# Patient Record
Sex: FEMALE | Race: WHITE | NOT HISPANIC OR LATINO | Employment: FULL TIME | URBAN - METROPOLITAN AREA
[De-identification: names, ages, dates, MRNs, and addresses within clinical notes are randomized per-mention and may not be internally consistent; named-entity substitution may affect disease eponyms.]

---

## 2018-05-18 ENCOUNTER — HISTORICAL (OUTPATIENT)
Dept: ADMINISTRATIVE | Facility: HOSPITAL | Age: 38
End: 2018-05-18

## 2018-05-21 ENCOUNTER — HISTORICAL (OUTPATIENT)
Dept: ADMINISTRATIVE | Facility: HOSPITAL | Age: 38
End: 2018-05-21

## 2018-08-14 ENCOUNTER — HISTORICAL (OUTPATIENT)
Dept: LAB | Facility: HOSPITAL | Age: 38
End: 2018-08-14

## 2018-08-14 ENCOUNTER — HISTORICAL (OUTPATIENT)
Dept: ADMINISTRATIVE | Facility: HOSPITAL | Age: 38
End: 2018-08-14

## 2019-10-04 LAB — PAP RECOMMENDATION EXT: NORMAL

## 2021-04-17 ENCOUNTER — HISTORICAL (OUTPATIENT)
Dept: ADMINISTRATIVE | Facility: HOSPITAL | Age: 41
End: 2021-04-17

## 2021-05-05 ENCOUNTER — HISTORICAL (OUTPATIENT)
Dept: ADMINISTRATIVE | Facility: HOSPITAL | Age: 41
End: 2021-05-05

## 2021-06-01 ENCOUNTER — HISTORICAL (OUTPATIENT)
Dept: ADMINISTRATIVE | Facility: HOSPITAL | Age: 41
End: 2021-06-01

## 2021-06-22 ENCOUNTER — HISTORICAL (OUTPATIENT)
Dept: LAB | Facility: HOSPITAL | Age: 41
End: 2021-06-22

## 2021-06-22 ENCOUNTER — HISTORICAL (OUTPATIENT)
Dept: ADMINISTRATIVE | Facility: HOSPITAL | Age: 41
End: 2021-06-22

## 2021-07-14 ENCOUNTER — HISTORICAL (OUTPATIENT)
Dept: RADIOLOGY | Facility: HOSPITAL | Age: 41
End: 2021-07-14

## 2021-07-20 ENCOUNTER — HISTORICAL (OUTPATIENT)
Dept: ADMINISTRATIVE | Facility: HOSPITAL | Age: 41
End: 2021-07-20

## 2021-07-28 ENCOUNTER — HISTORICAL (OUTPATIENT)
Dept: SURGERY | Facility: HOSPITAL | Age: 41
End: 2021-07-28

## 2021-08-31 ENCOUNTER — HISTORICAL (OUTPATIENT)
Dept: RADIOLOGY | Facility: HOSPITAL | Age: 41
End: 2021-08-31

## 2021-09-23 ENCOUNTER — HISTORICAL (OUTPATIENT)
Dept: ADMINISTRATIVE | Facility: HOSPITAL | Age: 41
End: 2021-09-23

## 2021-10-11 ENCOUNTER — HISTORICAL (OUTPATIENT)
Dept: LAB | Facility: HOSPITAL | Age: 41
End: 2021-10-11

## 2021-10-28 ENCOUNTER — HISTORICAL (OUTPATIENT)
Dept: ADMINISTRATIVE | Facility: HOSPITAL | Age: 41
End: 2021-10-28

## 2021-11-30 ENCOUNTER — HISTORICAL (OUTPATIENT)
Dept: ADMINISTRATIVE | Facility: HOSPITAL | Age: 41
End: 2021-11-30

## 2021-12-21 ENCOUNTER — HISTORICAL (OUTPATIENT)
Dept: ADMINISTRATIVE | Facility: HOSPITAL | Age: 41
End: 2021-12-21

## 2022-01-14 ENCOUNTER — HISTORICAL (OUTPATIENT)
Dept: RESPIRATORY THERAPY | Facility: HOSPITAL | Age: 42
End: 2022-01-14

## 2022-03-23 ENCOUNTER — HISTORICAL (OUTPATIENT)
Dept: ADMINISTRATIVE | Facility: HOSPITAL | Age: 42
End: 2022-03-23

## 2022-03-30 ENCOUNTER — HISTORICAL (OUTPATIENT)
Dept: ADMINISTRATIVE | Facility: HOSPITAL | Age: 42
End: 2022-03-30

## 2022-04-07 ENCOUNTER — HISTORICAL (OUTPATIENT)
Dept: ADMINISTRATIVE | Facility: HOSPITAL | Age: 42
End: 2022-04-07
Payer: COMMERCIAL

## 2022-04-22 ENCOUNTER — HISTORICAL (OUTPATIENT)
Dept: LAB | Facility: HOSPITAL | Age: 42
End: 2022-04-22
Payer: COMMERCIAL

## 2022-04-22 LAB
ABS NEUT (OLG): 7.91 (ref 2.1–9.2)
BASOPHILS # BLD AUTO: 0.06 10*3/UL (ref 0–0.2)
BASOPHILS NFR BLD AUTO: 0.5 % (ref 0–1)
CRP SERPL HS-MCNC: 43 MG/L (ref 0–5)
EOSINOPHIL # BLD AUTO: 0.22 10*3/UL (ref 0–0.9)
EOSINOPHIL NFR BLD AUTO: 1.9 % (ref 0–6.4)
ERYTHROCYTE [DISTWIDTH] IN BLOOD BY AUTOMATED COUNT: 13.1 % (ref 11.5–17)
ERYTHROCYTE [SEDIMENTATION RATE] IN BLOOD: 56 MM/H (ref 0–20)
HCT VFR BLD AUTO: 42.7 % (ref 37–47)
HGB BLD-MCNC: 13.8 G/DL (ref 12–16)
IMM GRANULOCYTES # BLD AUTO: 0.03 10*3/UL (ref 0–0.02)
IMM GRANULOCYTES NFR BLD AUTO: 0.3 % (ref 0–0.43)
LYMPHOCYTES # BLD AUTO: 2.44 10*3/UL (ref 0.6–4.6)
LYMPHOCYTES NFR BLD AUTO: 21.4 % (ref 16–44)
MANUAL DIFF? (OHS): NO
MCH RBC QN AUTO: 28.7 PG (ref 27–31)
MCHC RBC AUTO-ENTMCNC: 32.3 G/DL (ref 33–36)
MCV RBC AUTO: 88.8 FL (ref 80–94)
MONOCYTES # BLD AUTO: 0.73 10*3/UL (ref 0.1–1.3)
MONOCYTES NFR BLD AUTO: 6.4 % (ref 4–12.1)
NEUTROPHILS # BLD AUTO: 7.91 10*3/UL (ref 2.1–9.2)
NEUTROPHILS NFR BLD AUTO: 69.5 % (ref 43–73)
NRBC BLD AUTO-RTO: 0 % (ref 0–0.2)
PLATELET # BLD AUTO: 344 10*3/UL (ref 130–400)
PMV BLD AUTO: 9.6 FL (ref 7.4–10.4)
RBC # BLD AUTO: 4.81 10*6/UL (ref 4.2–5.4)
WBC # SPEC AUTO: 11.4 10*3/UL (ref 4.5–11.5)

## 2022-04-23 VITALS
OXYGEN SATURATION: 98 % | DIASTOLIC BLOOD PRESSURE: 74 MMHG | SYSTOLIC BLOOD PRESSURE: 112 MMHG | BODY MASS INDEX: 34.92 KG/M2 | WEIGHT: 184.94 LBS | HEIGHT: 61 IN

## 2022-04-30 NOTE — CONSULTS
DATE OF CONSULTATION:  04/26/2021    ATTENDING PHYSICIAN:    CONSULTING PHYSICIAN:  Joce Gibbons MD    REASON FOR CONSULTATION:  Removal of a PEG tube.    HISTORY OF PRESENT ILLNESS:  This is a 40-year-old female who presented to [facility] on the 20th February 2021 after falling 8 feet from the platform and landing on gravel.  She has a past medical history of anxiety, depression.  She had a right C7 transverse process fracture, right clavicular fracture, right rib fractures, left rib fractures, bilateral pulmonary contusions, hemopneumothorax, left pneumothorax, T6 through 9 transverse process fractures, left femoral neck fractures.  She had nonoperative care for cervical fractures.  She had ORIF for her left femoral neck.  She developed respiratory failure and required prolonged intubation and tracheostomy and PEG tube placement.  Her tracheostomy has since been removed.  She has now been recovering well, but is still nonweightbearing.  She is able to eat and take pills on her own, and is fully conversant and able to control her airway.  She no longer needs her PEG tube.  For this reason, I was consulted to remove the PEG tube.    REVIEW OF SYSTEMS:  Negative, unless otherwise stated.    ALLERGIES:  She has no known allergies.    MEDICATIONS:  Please see med rec.    PHYSICAL EXAM:  VITAL SIGNS:  Temperature is 36.9 degrees centigrade, 95 beats per minute, 20 breaths per minute, 124/81 is her blood pressure and her saturation 97%.   GENERAL:  She is awake and alert x4.  No acute distress.  Cranial nerves 2 through 12 are grossly intact bilaterally.     HEENT:  She is normocephalic, atraumatic.     NECK:  Clean.  Trachea midline.  Old tracheostomy site is healing well.  No cervical lymphadenopathy.   RESPIRATORY:  Clear to auscultation bilaterally.  No wheezes, rales or rhonchi.     HEART:  Regular rate and rhythm.  No rubs, murmurs, or gallops.   ABDOMEN:  Soft, nontender, nondistended.  Bowel  sounds present.  PEG tube was in place.    IMPRESSION AND PLAN:  40-year-old female with multiple medical problems, polytrauma, right clavicular fracture, acute hypoxemic respiratory failure, __________, oropharyngeal dysphagia, anxiety, depression, and gastroesophageal reflux disease.    PLAN:  I removed her PEG tube at the bedside and covered the gastrostomy site with a clean dressing.  This dressing should be changed as frequently as needed for the next couple of days or so.  The gastrostomy site will close on its own within 24 to 48 hours' time.     Thank you for the consultation.  My cell phone number is [phone].  Please do not hesitate to contact me for the care of this patient or any other patient in need of general bariatric surgical care.        ______________________________  Joce Gibbons MD RA/SLAVA  DD:  04/26/2021  Time:  04:00PM  DT:  04/26/2021  Time:  04:36PM  Job #:  [id number]

## 2022-04-30 NOTE — DISCHARGE SUMMARY
Patient:   Bernadette Lopez             MRN: [id number]            FIN: [id number]               Age:   40 years     Sex:  Female     :  1980   Associated Diagnoses:   None   Author:   Aftab Dickens MD      Date of Service: 2021      Discharge Information      Discharge Summary Information   Date of Admission: 5/3/2021  Date of Discharge: 2021  Admit Diagnosis: Polytrauma         Acute hypoxic respiratory failure/ARDs         Right C7 and T6-T9 transverse process fracture         Right clavicular fracture         Oropharyngeal dysphagia         Nicotine dependence         Anxiety/depression         Cough         GERD         Insomnia         Microcytic anemia  Discharge Diagnosis: Polytrauma (improving)           Acute hypoxic respiratory failure/ARDs (resolved)           Right C7 and T6-T9 transverse process fracture (stable)            Right clavicular fracture (stable)            Oropharyngeal dysphagia (resolved)           Nicotine dependence (stable)            Anxiety/depression (stable)            Cough (resolved)           GERD (stable)            Insomnia (stable)            Microcytic anemia (stable)            Constipation (stable)            Left shoulder numbness (stable)           Left thigh swelling (improved)           Contact dermatitis (resolved)    COVID-19 testing: Negative on 5/3  COVID-19 vaccination status: Not vaccinated    Internal Medicine (attending): Aftab Dickens MD  Physiatry (consulting): Mack Perez MD  Pulmonology: Bucky Khan MD  Orthopedic surgeon: Gissell Torres DO    OUTPATIENT PROVIDERS  Primary Care Physician - Joce Ferro III, MD  Neurosurgeon: Stevan Roberts MD  Orthopedic surgeon: Gissell Torres DO  General surgeon: Cosme Valverde MD   Cardiologist: Shelby Sena MD  ID: Dutch Ellison MD      Hospital Course   40-year-old WF presented to [facility] on 2021 per EMS after falling 8 feet from a platform landing on  gravel.  PMH significant for anxiety and depression.  Work-up significant for right C7 transverse process fracture, right clavicle fracture, right rib fractures 1, 2, 4, 5, 6 and left rib fracture 1 and 2, bilateral pulmonary contusions with trace right hemopneumothorax and small left pneumothorax, T6-T9 transverse process fracture, and left femoral neck fracture.  Neurosurgery evaluated right C7 transverse process fracture, nonoperative fracture and recommended collar x2 weeks for ligament healing.  On 2/20 tolerated ORIF of left femoral neck fracture without perioperative complications.  Postoperatively required BiPAP and transfer to ICU for acute hypoxemic respiratory failure.  Required continued pulmonary toileting with Lasix.  Continued to decline in respiratory status developing ARDS and requiring intubation on 2/24.  Initiated vancomycin and continued Lasix IVP.  Required vasopressors.  On 3/4 tolerated tracheostomy and PEG tube placement without perioperative complications.  Vasopressors weaned and discontinued.  Sutures removed from left hip on 3/5.  On 3/11 required atropine x2 after bradycardic episodes 2/2 significant coughing likely vagal response.  Transthoracic echocardiogram with normal EF on 3/12.  Throughout inpatient ICU course required high PEEP and FiO2 ventilatory requirements.  Continued low-grade fevers requiring vasculitis workup.  Blood cultures remained negative.  Respiratory PCR on 03/13 negative.  Increased secretions around the trach grew normal respiratory moon.  HIV negative.  BERLIN equivocal.  Double-stranded DNA antibody negative.  Scl-70S negative.  On 3/16 tracheostomy changed out for extra large per surgical hospitalist.  Began tolerating CPAP trials on 3/25.  Respiratory status continued to improve and began tolerating trach collar trials on 3/30.  Downsized tracheostomy to #6 on 3/31.  Began tolerating speaking valve and eating with regular diet on 4/3.  Tolerating pressure  support via ventilator at night with trach collar during daytime hours.  Tolerated transfer to [facility] ([facility]) inpatient LTAC on 4/6 without incident.  During inpatient LTAC course CXR on admission with increased effusion.  Budesonide and Mucinex initiated.  Codeine initiated for cough exacerbation.  Poor sleep hygiene and increased anxiety reported on 4/10.  BuSpar increased to 2 mg 3 times daily and Lexapro to 20 mg daily.  Seroquel 25 mg initiated for sleep hygiene.  Tylenol 500 mg every 6 hours initiated for pain control.  Codeine and Seroquel in a.m. discontinued on 4/13.  Lexapro discontinued and Cymbalta 30 mg twice daily initiated.  Klonopin continued.  CT thorax significant for bilateral pleural effusions and mild pericardial effusion on 4/14.  Aggressively diuresed with improvement of respiratory function.  Began tolerating trach collar and Passy-Karmen for greater than 8 hours on 4/18.  Appetite continues to improve and tube feedings changed to cyclic at night.  Tube feedings discontinued on 4/20 and patient continued to eat regular diet 100%.  Megace discontinued on 4/22.  Orthopedic surgery evaluated on 4/22 and recommended ROM as tolerated to RUE and LLE.  NWB to RUE, and TTWB to LLE.  Orthopedic surgery recommended following up between 5/13 and 5/20.  Tolerated decannulation of trach on 4/23.  Robaxin discontinued on 4/24, but due to increased pain resumed on 4/25.  Seroquel discontinued 25 mg at bedtime but due to poor sleep resumed at 50 mg on 4/26.  Anxiety medication slowly wean.  Continue to participate in progress with therapy with TTWB to LLE.  PEG tube removed on 4/26.  BuSpar decreased to 5 mg 3 times daily on 4/30.  Complains of left thigh pain with swelling on 5/2near surgical incision.  No evidence of infection.  No redness or heat appreciated.  LLE venous ultrasound negative for DVT.  Tolerated transfer to [facility] inpatient rehab unit on 5/4 without incident.    During  inpatient rehab course remained continent of bowel and bladder.  On 5/5 ultrasound of soft tissue left lower extremity with subcu edema with no hematoma.  Orthopedic surgery evaluated on 5/5.  Continued nonweightbearing to right upper extremity and 50% weightbearing left lower extremity with possibility of advancement outpatient.  X-rays stable of right clavicle and left hip.  No further surgical recommendations regarding left upper leg edema to surgical site.  On 5/8 increased pain with increased therapy.  Improved with Toradol x3 days and initiation of gabapentin.  On 5/10 reported slight increase of shortness of breath with activity when working with therapies.  Chest x-ray with improvement.  On 5/12 reported increased pain to rib fractures and improved with taping.  On 5/14 reported increased pain.  Left hip x-ray read new acute fracture not occluded.  Orthopedic surgery reviewed without changes to weightbearing status, continued to work with therapy without complaints.  PT reported independent to supervision overall.  OT reported independent to supervision with slightly decreased endurance.  Throughout inpatient rehab course sleep hygiene and anxiety improved.  Discontinued Seroquel at bedtime and BuSpar 3 times daily.  Labs today reviewed, CMP and CBC unremarkable.  Vital signs at goal.  Medication reconciliation completed.  Discharge orders initiated.  Stable for transfer home with home health and family care.  Follow-up with scheduled appointments documented below.     Chief Complaint: Polytrauma s/p right C7 transverse process fracture, right clavicle fracture, right rib fractures 1, 2, 4, 5, 6 and left rib fracture 1 and 2, bilateral pulmonary contusions, T6-T9 transverse process fracture, and left femoral neck fracture requiring ORIF of left femoral neck fracture on 2/20/2021 complicated by acute hypoxic respiratory failure/ARDS requiring intubation on 2/24/2021 s/p tracheostomy and PEG tube placement on  3/4/2021       Physical Examination      Vital Signs (last 24 hrs)_____  Last Charted___________  Temp Oral      36.4 DegC  (MAY 17 03:14)  Heart Rate Peripheral   96 bpm  (MAY 17 03:14)  Resp Rate          12 br/min  (MAY 17 03:14)  SBP      95 mmHg  (MAY 17 03:14)  DBP      62 mmHg  (MAY 17 03:14)  SpO2      96 %  (MAY 17 03:14)     General:  Alert and oriented, No acute distress.    Eye:  Pupils are equal, round and reactive to light, Vision unchanged.    HENT:  Normocephalic.    Neck:  Supple, Non-tender.    Respiratory:  Lungs are clear to auscultation, Respirations are non-labored, No chest wall tenderness, Neck incision healing open air.    Cardiovascular:  Normal rate, Regular rhythm, No murmur, No edema.    Gastrointestinal:  Soft, Non-tender, Normal bowel sounds.    Musculoskeletal:  Normal range of motion, No tenderness, No swelling, Left lower extremity surgical incision open to air dry and intact with small soft hematoma, Generalized weakness.    Integumentary:  Warm.    Neurologic:  Alert, Oriented, Normal sensory, Normal motor function, No focal deficits, Cranial Nerves II-XII are grossly intact.    Cognition and Speech:  Oriented, Speech clear and coherent, Functional cognition intact.    Psychiatric:  Cooperative, Appropriate mood & affect, Normal judgment, Non-suicidal.    *MD performed and documented physical examination         Results Review   General results   Most recent results   Discrete results only   5/17/2021 6:24 CDT       Est Creat Clearance Ser   89.40 mL/min    5/17/2021 5:35 CDT       WBC                       7.2 x10(3)/mcL                             RBC                       4.49 x10(6)/mcL                             Hgb                       11.6 gm/dL  LOW                             Hct                       37.6 %                             Platelet                  237 x10(3)/mcL                             MCV                       83.7 fL                             MCH                        25.8 pg  LOW                             MCHC                      30.9 gm/dL  LOW                             RDW                       16.1 %                             MPV                       9.8 fL                             Abs Neut                  2.98 x10(3)/mcL                             Neutro Auto               41.5 %  LOW                             Lymph Auto                44.4 %  HI                             Mono Auto                 8.6 %                             Eos Auto                  4.6 %                             Abs Eos                   0.33 x10(3)/mcL                             Basophil Auto             0.6 %                             Abs Neutro                2.98 x10(3)/mcL                             Abs Lymph                 3.19 x10(3)/mcL                             Abs Mono                  0.62 x10(3)/mcL                             Abs Baso                  0.04 x10(3)/mcL                             NRBC%                     0.0 %                             IG%                       0.300 %                             IG#                       0.0200  HI                             Sodium Lvl                139 mmol/L                             Potassium Lvl             3.7 mmol/L                             Chloride                  104 mmol/L                             CO2                       25 mmol/L                             Calcium Lvl               9.2 mg/dL                             Glucose Lvl               114 mg/dL  HI                             BUN                       4.7 mg/dL  LOW                             Creatinine                0.62 mg/dL                             eGFR-AA                   >60  NA                             eGFR-ANTONY                  >60 mL/min/1.73 m2  NA                             Bili Total                0.2 mg/dL                             Bili Direct               <0.1 mg/dL                              Bili Indirect             >0.10 mg/dL                             AST                       52 unit/L  HI                             ALT                       96 unit/L  HI                             Alk Phos                  130 unit/L                             Total Protein             6.6 gm/dL                             Albumin Lvl               3.1 gm/dL  LOW                             Globulin                  3.5 gm/dL                             A/G Ratio                 0.9 ratio  LOW                             Iron Lvl                  54 ug/dL                             Transferrin               185 mg/dL                             TIBC                      213 ug/dL  LOW                             Iron Sat                  25 %                             Ferritin Lvl              135.28 ng/mL                             Prealbumin                12.1 mg/dL  LOW                             UIBC                      159 ug/dL        Chest x-ray results   Reported at  5/12/2021 14:09:00, Interpretation:  Improvement.   Radiology results   X-ray, Left femur , Reported at  5/13/2021 15:15:00, Interpretation:  Prominent soft tissues with postsurgical changes of the femoral neck. No acute osseous abnormality appreciated.   Radiology results   X-ray, Right clavicle , Reported at  5/5/2021 13:25:00, Interpretation:  There is similar appearance of comminuted fracture of the distal right clavicle with some callus formation. Fracture line extend to the articular surface of distal clavicle. There is no separation of the acromioclavicular joint. .   Radiology results   US, Left lower extremity soft tissue , Reported at  5/4/2021 15:30:00, Interpretation:  Mild subcutaneous edema with no discrete hematoma appreciated.   Radiology results   X-ray, Mandible , Reported at  5/6/2021 12:57:00, Interpretation:  No acute osseous displacement is appreciated. The facial cavities and mastoid air cells  are well aerated. There is no evidence of air-fluid level within the sinuses to suggest acute inflammatory change or hemosinus. The soft tissues are without focal finding.   Radiology results   X-ray, Left hip , Reported at  5/13/2021 15:15:00, Interpretation:  Surgical screws are noted. Fracture plane remains evident. New acute fracture is not excluded.       Discharge Plan   Discharge Summary Plan   Discharge Status: improved.        Location: Discharge to home with home health     Medications: See discharge medicine reconciliation     Activity: ROM as tolerated to RUE and LLE, NWB RUE, 50%WB LLE     Diet: Regular diet     Instructions:  Take all medications as prescribed.          Attend appointments as scheduled.          Return to ED if symptoms worsen, or if t > 100.4.     Education: Polytrauma, left shoulder numbness, insomnia     Follow-up: Dutch Ellison MD on 6/1/2021 at 915         Gissell Torres MD on 6/2/2021 at 830         Stevan Roberts MD on 5/26/2021 at 745         Shelby Sena MD on 5/28/2021 at 1330    Discussed plan of care, and patient communicated understanding. Agreed to comply with recommendations.  Lorenza Avendano NP conducted independent physical examination and assisted with medical documentation.    Discharge Time: 56 minutes

## 2022-04-30 NOTE — DISCHARGE SUMMARY
Patient:   Bernadette Lopez             MRN: [id number]            FIN: [id number]               Age:   40 years     Sex:  Female     :  1980   Associated Diagnoses:   None   Author:   Aftab Dickens MD      Date of Service: 5/3/2021      Discharge Information      Discharge Summary Information   Date of Admission: 2021  Date of Discharge: 5/3/2021  Admit Diagnosis: Polytrauma         Acute hypoxic respiratory failure/ARDs         Right C7 and T6-T9 transverse process fracture         Right clavicular fracture         Oropharyngeal dysphagia         Nicotine dependence         Anxiety/depression         Cough         GERD         Insomnia         Microcytic anemia  Discharge Diagnosis: Polytrauma (improving)           Acute hypoxic respiratory failure/ARDs (resolved)           Right C7 and T6-T9 transverse process fracture (stable)            Right clavicular fracture (stable)            Oropharyngeal dysphagia (resolved)           Nicotine dependence (stable)            Anxiety/depression (stable)            Cough (resolved)           GERD (stable)            Insomnia (stable)            Microcytic anemia (stable)            Constipation (stable)            Left shoulder numbness (stable)           Left thigh swelling (stable)  Internal Medicine (attending): Aftab Dickens MD  Pulmonology: Bucky Khan MD    OUTPATIENT PROVIDERS  Primary Care Physician - Joce Ferro III, MD  Neurosurgeon: Stevan Roberts MD  Orthopedic surgeon: Gissell Torres DO  General surgeon: Cosme Valverde MD   Cardiologist: Shelby Sena MD  ID: Dutch Ellison MD         Hospital Course   40-year-old WF presented to [facility] on 2021 per EMS after falling 8 feet from a platform landing on gravel.  PMH significant for anxiety and depression.  Work-up significant for right C7 transverse process fracture, right clavicle fracture, right rib fractures 1, 2, 4, 5, 6 and left rib fracture 1 and 2,  bilateral pulmonary contusions with trace right hemopneumothorax and small left pneumothorax, T6-T9 transverse process fracture, and left femoral neck fracture.  Neurosurgery evaluated right C7 transverse process fracture, nonoperative fracture and recommended collar x2 weeks for ligament healing.  On 2/20 tolerated ORIF of left femoral neck fracture without perioperative complications.  Postoperatively required BiPAP and transfer to ICU for acute hypoxemic respiratory failure.  Required continued pulmonary toileting with Lasix.  Continued to decline in respiratory status developing ARDS and requiring intubation on 2/24.  Initiated vancomycin and continued Lasix IVP.  Required vasopressors.  On 3/4 tolerated tracheostomy and PEG tube placement without perioperative complications.  Vasopressors weaned and discontinued.  Sutures removed from left hip on 3/5.  On 3/11 required atropine x2 after bradycardic episodes 2/2 significant coughing likely vagal response.  Transthoracic echocardiogram with normal EF on 3/12.  Throughout inpatient ICU course required high PEEP and FiO2 ventilatory requirements.  Continued low-grade fevers requiring vasculitis workup.  Blood cultures remained negative.  Respiratory PCR on 03/13 negative.  Increased secretions around the trach grew normal respiratory moon.  HIV negative.  BERLIN equivocal.  Double-stranded DNA antibody negative.  Scl-70S negative.  On 3/16 tracheostomy changed out for extra large per surgical hospitalist.  Began tolerating CPAP trials on 3/25.  Respiratory status continued to improve and began tolerating trach collar trials on 3/30.  Downsized tracheostomy to #6 on 3/31.  Began tolerating speaking valve and eating with regular diet on 4/3.  Tolerating pressure support via ventilator at night with trach collar during daytime hours.  Tolerated transfer to [facility] inpatient LTAC on 4/6 without incident.    During inpatient LTAC course CXR  on admission with increased effusion.  Budesonide and Mucinex initiated.  Codeine initiated for cough exacerbation.  Poor sleep hygiene and increased anxiety reported on 4/10.  BuSpar increased to 2 mg 3 times daily and Lexapro to 20 mg daily.  Seroquel 25 mg initiated for sleep hygiene.  Tylenol 500 mg every 6 hours initiated for pain control.  Codeine and Seroquel in a.m. discontinued on 4/13.  Lexapro discontinued and Cymbalta 30 mg twice daily initiated.  Klonopin continued.  CT thorax significant for bilateral pleural effusions and mild pericardial effusion on 4/14.  Aggressively diuresed with improvement of respiratory function.  Began tolerating trach collar and Passy-Karmen for greater than 8 hours on 4/18.  Appetite continues to improve and tube feedings changed to cyclic at night.  Tube feedings discontinued on 4/20 and patient continued to eat regular diet 100%.  Megace discontinued on 4/22.  Orthopedic surgery evaluated on 4/22 and recommended ROM as tolerated to RUE and LLE.  NWB to RUE, and TTWB to LLE.  Orthopedic surgery recommended following up between 5/13 and 5/20.  Tolerated decannulation of trach on 4/23.  Robaxin discontinued on 4/24, but due to increased pain resumed on 4/25.  Seroquel discontinued 25 mg at bedtime but due to poor sleep resumed at 50 mg on 4/26.  Anxiety medication slowly wean.  Continue to participate in progress with therapy with TTWB to LLE.  PEG tube removed on 4/26.  BuSpar decreased to 5 mg 3 times daily on 4/30.  Complains of left thigh pain with swelling on 5/2near surgical incision.  No evidence of infection.  No redness or heat appreciated.  LLE venous ultrasound negative for DVT.  Vital signs remained stable.  CMP and CBC unremarkable.  Med reconciliation completed.  Discharge orders initiated.  Stable for transfer.  To inpatient rehab unit.  To follow-up with MD within 24 hours of admission to inpatient rehab unit.    Chief Complaint: Polytrauma s/p right C7  transverse process fracture, right clavicle fracture, right rib fractures 1, 2, 4, 5, 6 and left rib fracture 1 and 2, bilateral pulmonary contusions, T6-T9 transverse process fracture, and left femoral neck fracture requiring ORIF of left femoral neck fracture on 2/20/2021 complicated by acute hypoxic respiratory failure/ARDS requiring intubation on 2/24/2021 s/p tracheostomy and PEG tube placement on 3/4/2021       Physical Examination      Vital Signs (last 24 hrs)_____  Last Charted___________  Temp Oral         36.7 DegC  (MAY 03 08:00)  Resp Rate             20 br/min  (MAY 03 08:00)  SBP      119 mmHg  (MAY 03 08:00)  DBP      74 mmHg  (MAY 03 08:00)  SpO2      98 %  (MAY 03 08:00)     General:  Alert and oriented, No acute distress.    Eye:  Pupils are equal, round and reactive to light, Vision unchanged.    HENT:  Normocephalic.    Neck:  Supple, Non-tender, Old ostomy site closedhealing/PRIYANKA.    Respiratory:  Lungs are clear to auscultation, Respirations are non-labored.    Cardiovascular:  Normal rate, Regular rhythm, No murmur, Normal peripheral perfusion.         Edema: Bilateral, Lower extremity, Trace.    Gastrointestinal:  Soft, Non-tender, Normal bowel sounds, Abdominal dressing clean and intact.    Musculoskeletal:  Generalized weakness.    Integumentary:  Warm, Pressure related injury, LLE incision PRIYANKA.    Neurologic:  Alert, Oriented, Normal sensory, Normal motor function, No focal deficits, Cranial Nerves II-XII are grossly intact.    Cognition and Speech:  Oriented, Speech clear and coherent, Functional cognition intact, Tolerating Passy-Karmen valve.    Psychiatric:  Cooperative, Appropriate mood & affect, Normal judgment, Non-suicidal.    *MD performed and documented physical examination         Results Review   General results   Most recent results   Discrete results only   5/3/2021 3:16 CDT        Est Creat Clearance Ser   171.57 mL/min    5/3/2021 2:35 CDT        WBC                        8.2 x10(3)/mcL                             RBC                       4.56 x10(6)/mcL                             Hgb                       11.7 gm/dL  LOW                             Hct                       38.5 %                             Platelet                  325 x10(3)/mcL                             MCV                       84.4 fL                             MCH                       25.7 pg  LOW                             MCHC                      30.4 gm/dL  LOW                             RDW                       17.0 %                             MPV                       9.1 fL                             Abs Neut                  3.06 x10(3)/mcL                             Neutro Auto               37.4 %  LOW                             Lymph Auto                54.8 %  HI                             Mono Auto                 5.0 %                             Eos Auto                  2.1 %                             Abs Eos                   0.17 x10(3)/mcL                             Basophil Auto             0.6 %                             Abs Neutro                3.06 x10(3)/mcL                             Abs Lymph                 4.49 x10(3)/mcL                             Abs Mono                  0.41 x10(3)/mcL                             Abs Baso                  0.05 x10(3)/mcL                             NRBC%                     0.0 %                             IG%                       0.100 %                             IG#                       0.0100                             Sed Rate                  33 mm/hr  HI                             Sodium Lvl                139 mmol/L                             Potassium Lvl             3.7 mmol/L                             Chloride                  105 mmol/L                             CO2                       23 mmol/L                             Calcium Lvl               9.7 mg/dL                             Magnesium  Lvl             2.22 mg/dL                             Glucose Lvl               93 mg/dL                             BUN                       12.3 mg/dL                             Creatinine                0.60 mg/dL                             eGFR-AA                   >60  NA                             eGFR-ANTONY                  >60 mL/min/1.73 m2  NA                             Bili Total                0.2 mg/dL                             Bili Direct               0.1 mg/dL                             Bili Indirect             0.10 mg/dL                             AST                       13 unit/L                             ALT                       25 unit/L                             Alk Phos                  116 unit/L                             Total Protein             7.9 gm/dL                             Albumin Lvl               3.9 gm/dL                             Globulin                  4.0 gm/dL  HI                             A/G Ratio                 1.0 ratio  LOW                             Phosphorus                5.2 mg/dL  HI                             CRP High Sens             10.02 mg/L  HI                             Prealbumin                24.6 mg/dL        Chest x-ray results   Reported at  4/7/2021 05:00:00, Interpretation:  Markedly prominent interstitial markings are slightly increased in the interval with development of layering right effusion.   Radiology results   ECHO, Transthoracic echocardiogram , Reported at  3/13/2021 10:50:00, Interpretation:  Normal left ventricular size and function.  Left ventricular EF 60-65%.  No evidence of significant pericardial effusion.  Mean gradient across mitral valve 5 mmHg.   Radiology results   X-ray, Left hip , Reported at  3/26/2021 13:28:00, Interpretation:  There has been interval insertion of hardware fixating the proximal femur position and alignment is anatomical hardware appears to be intact No new acute displaced  fractures or dislocations. Joint spaces preserved. No blastic or lytic lesions. Soft tissues within normal limits.   Radiology results   X-ray, Right clavicle , Reported at  3/26/2021 13:28:00, Interpretation:  2 views of the right clavicle demonstrate similar position alignment comminuted fracture distal third of the clavicle. The AC joint appears to be intact.   Radiology results   CT, Abdomen and pelvis , Reported at  3/8/2021 14:11:00, Interpretation:  Significant interval worsening of confluent bilateral groundglass pulmonary airspace opacities, as detailed above. Findings are highly suspected to represent sequela of respiratory failure, with superimposed infectious or inflammatory process not definitively excluded. Bilateral low-density pleural effusions, small to moderate volume on the right and small volume on the left. Increased subcutaneous soft tissue edema of the imaged proximal bilateral lower extremities and left greater than right flanks. Postsurgical changes of interval tracheostomy, gastrostomy tube placement, and left hip fixation. Additional secondary findings, as detailed above.   Radiology results   CT, With contrast, Thorax , Reported at  3/8/2021 14:11:00, Interpretation:  Significant interval worsening of confluent bilateral groundglass pulmonary airspace opacities, as detailed above. Findings are highly suspected to represent sequela of respiratory failure, with superimposed infectious or inflammatory process not definitively excluded. Bilateral low-density pleural effusions, small to moderate volume on the right and small volume on the left. Increased subcutaneous soft tissue edema of the imaged proximal bilateral lower extremities and left greater than right flanks. Postsurgical changes of interval tracheostomy, gastrostomy tube placement, and left hip fixation.   Radiology results   CT, Cervical spine , Reported at  2/20/2021 13:22:00, Interpretation:  Nondisplaced fracture of the right  transverse process of C7 best seen on coronal reconstruction (series 606 image 20). Ligament, spinal cord and/or vascular abnormalities cannot be excluded on the basis of this examination.       Discharge Plan   Discharge Summary Plan   Discharge Status: improved.        Location: Discharge to inpatient rehab     Medications: See discharge medicine reconciliation     Activity: ROM as tolerated to RUE and LLE, NWB RUE, TTWB LLE      Diet: Regular          Education: Polytrauma.  Anxiety/depression.  Nicotine dependence.     Follow-up:  Follow-up with inpatient rehab MD on admission    Discussed plan of care, and patient communicated understanding. Agreed to comply with recommendations.    Smith Hillman NP conducted independent examination and assisted with medical documentation.     Discharge Time: 52 minutes

## 2022-04-30 NOTE — OP NOTE
Patient:   Bernadette Lopez             MRN: [id number]            FIN: [id number]               Age:   40 years     Sex:  Female     :  1980   Associated Diagnoses:   Failed total hip arthroplasty   Author:   Michel Peña MD      Operative Note   Operative Information   Date/ Time:  10/15/2018 07:38:00.     Procedures Performed: Left hip aspiration using fluoroscopy.     Preoperative Diagnosis: Failed total hip arthroplasty ([id number] T84.018A).     Postoperative Diagnosis: Failed total hip arthroplasty ([id number] T84.018A).     Surgeon: Michel Peña MD.     Anesthesia: concious sedation.     Description of Procedure/Findings/    Complications: Patient was involved who complains of ongoing Left hip pain.  We discussed all treatment options.  Patient has tried and failed all measures.  Pre-op lab work was obtained with elevated inflammatory markers.  Given then, aspiration was discussed to rule out infectious etiology.  The risk, benefits, alternatives to aspiration of the left hip under anesthesia were discussed in detail including but limited to infection, bleeding, scarring, need for revision surgery, and resolution of pain.  Despite these risks patient elected to proceed with surgical mention.    Patient seen in preoperative holding.  The risk benefits alternatives again discussed.  All questions answered no guarantees made.  Patient was marked and consented.  Was taken the operating room.  placed under conscious sedation.  Using fluoroscopic guidance, the insertion site was localized.  It was injected with 3-4 cc 1% lidocaine.  Afterwards an 18-gauge spinal needle was then inserted using fluoroscopic guidance to the superior anterior aspect of the left hip/acetabulum.  2-3 cc of Isovue contrast was then injected.  We had an excellent arthrogram.  Afterwards, 1-2 cc of fluid was aspiration.  This fluid will be sent for cell count, cultures.  Needle was removed.  Band-Aid was placed over the  injection site.  Patient arose from anesthesia without issue.  Was transferred to recovery room in stable condition. postop he will be weightbearing as tolerated.  Patient will be discharged home.  .     Esimated blood loss: loss  3  cc.

## 2022-05-01 NOTE — HISTORICAL [facility] CERNER
This is a historical note converted from [facility]. Formatting and pictures may have been removed.  Please reference [facility] for original formatting and attached multimedia. Chief Complaint  3 month s/p Left displaced femoral neck fracture, closed-SX 2/20/21 GL 05/21/21. Pt states in PT/OT. Pt states still having a lot of pain and it is hard to do therapy..NG  History of Present Illness  Patient into the office today?for follow-up regarding?open treatment of a displaced left femoral neck fracture?on 2/20/2021 with closed treatment of the right distal clavicle fracture.? The patient is over 3 months out now from fracture treatment.? She has been partial weightbearing to her left lower extremity and nonweightbearing to the right upper extremity.? She is finally home now and just started working with outpatient physical therapy.? She states she continues to have pain in her right shoulder and left hip.  Review of Systems  14 point review of systems completed and negative unless otherwise stated above  Physical Exam  Vitals & Measurements  HR:?96(Peripheral)? BP:?95/62?  HT:?154.00?cm? WT:?83.400?kg? BMI:?35.17?  Right upper extremity was examined.? There is no tenderness to palpation to the distal clavicle. ?Integument is intact.? Patient forward flexion of the shoulder is to?100 degrees and abduction is to 90 degrees.??Capillary refill is brisk to all digits. ?Gross sensation is intact to all digits.  Left lower extremity examined.? Able to passively internally and externally rotate the left hip without eliciting pain. ?Compartments of the left lower extremity are soft and compressible.? Patient can dorsiflex and plantarflex ankle. ?Gross sensation is intact to the left foot. ?Capillary refill is brisk to all toes.  Assessment/Plan  Clavicle fracture?S42.009A  ?I discussed with the patient?that she is doing well at this point in time. ?I will continue to have to monitor the left hip especially since she is a young  individual with a displaced left femoral neck?which was reduced and primarily fixed. ?The patient is weight-bear as tolerated to the left lower extremity and weight-bear as tolerated to the right upper extremity.? She should work on aggressive active and passive range of motion of the right shoulder.? A prescription was updated for outpatient formal physical therapy.? I will refill prescription for narcotic pain medication but she needs to start to wean this medication?and I will just do 1 more refill on her muscle relaxer.? I will see this patient back in the office in approximately 1 month. ?On her next visit I will obtain x-rays of the right shoulder and of the left hip.? All questions were answered for the patient today. ?She is agreeable with the current plan.  Ordered:  PT/OT External Referral, 06/01/21 12:09:00 CDT, Fracture of neck of left femur  Clavicle fracture, Active ROM  Passive ROM, 3 X Week, Standard Precautions, RUE WBAT Active/Passive ROM Rt shoulder LLE WBAT Active/Passive ROM LLE  ?  Fracture of neck of left femur?S72.002A  Ordered:  PT/OT External Referral, 06/01/21 12:09:00 CDT, Fracture of neck of left femur  Clavicle fracture, Active ROM  Passive ROM, 3 X Week, Standard Precautions, RUE WBAT Active/Passive ROM Rt shoulder LLE WBAT Active/Passive ROM LLE  ?  Referrals  PT/OT External Referral, 06/01/21 12:09:00 CDT, Fracture of neck of left femur  Clavicle fracture, Active ROM  Passive ROM, 3 X Week, Standard Precautions, RUE WBAT Active/Passive ROM Rt shoulder LLE WBAT Active/Passive ROM LLE   Problem List/Past Medical History  Ongoing  ARDS (adult respiratory distress syndrome)  Closed displaced fracture of left femoral neck  Closed displaced fracture of shaft of right clavicle  Depression with anxiety  Fracture of transverse process of cervical vertebra  Fracture of transverse process of thoracic vertebra  Hand pain, right  Multiple rib fractures  Obesity  Pneumothorax  S/P  percutaneous endoscopic gastrostomy (PEG) tube placement  Shingles  Status post tracheostomy  Tobacco user  Historical  Fever of unknown origin co-occurrent with human immunodeficiency virus infection  Pregnant  Pregnant  Procedure/Surgical History  Change Tracheostomy Device in Trachea, External Approach (2021)  Bypass Trachea to Cutaneous with Tracheostomy Device, Open Approach (2021)  Insertion of Feeding Device into Stomach, Percutaneous Approach (2021)  Insertion of Infusion Device into Superior Vena Cava, Percutaneous Approach (2021)  PEG Tube Insertion in Surgery (.) (2021)  Tracheostomy (.) (2021)  Insertion of Endotracheal Airway into Trachea, Via Natural or Artificial Opening (2021)  Respiratory Ventilation, Greater than 96 Consecutive Hours (2021)  ORIF Femur (2021)  Reposition Left Upper Femur with Internal Fixation Device, Open Approach (2021)   delivery only; (2018)   Section (None) (2018)  Extraction of Products of Conception, Low Cervical, Open Approach (2018)  ABD US  Biopsy of liver  Bronchoscope   delivery  Cholecystectomy  CT of abdomen  CT of head and sinuses  FESS - FSS sphenoidotomy  PEG  Rectilinear-scanning nuclear medicine system  rt knee repair  Tonsillectomy and adenoidectomy  Tracheostomy   Medications  acetaminophen-oxycodone 325 mg-10 mg oral tablet, 1 tab(s), Oral, q6hr  clonazePAM 0.5 mg oral tablet, 0.25 mg= 0.5 tab(s), Oral, BID  Cymbalta 20 mg oral delayed release capsule, 60 mg= 3 cap(s), Oral, BID  ferrous sulfate 325 mg (65 mg elemental iron) oral tablet, 325 mg, Oral, BID  gabapentin 400 mg oral capsule, 400 mg= 1 cap(s), Oral, q8hr  tiZANidine 4 mg oral tablet, 4 mg= 1 tab(s), Oral, TID  Allergies  No Known Allergies  No Known Medication Allergies  Social History  Abuse/Neglect  No, 2021  Tobacco  Cigarettes but not daily within the last 30 days, N/A, 10 per day. 15  year(s)., 06/01/2021  Family History  Diverticulitis of colon.: Father.  Hypothyroidism.: Mother.  Immunizations  Vaccine Date Status   COVID-19 MRNA, LNP-S, PF- Pfizer 02/01/2021 Given   COVID-19 MRNA, LNP-S, PF- Pfizer 01/11/2021 Given   tetanus/diphtheria/pertussis, acel(Tdap) 06/28/2018 Given   Health Maintenance  Health Maintenance  ???Pending?(in the next year)  ??? ??OverDue  ??? ? ? ?Influenza Vaccine due??10/01/20??and every 1??day(s)  ??? ? ? ?Smoking Cessation due??01/01/21??Variable frequency  ??? ? ? ?Alcohol Misuse Screening due??01/02/21??and every 1??year(s)  ??? ??Due?  ??? ? ? ?Lipid Screening due??06/01/21??Unknown Frequency  ??? ??Due In Future?  ??? ? ? ?Obesity Screening not due until??01/01/22??and every 1??year(s)  ??? ? ? ?ADL Screening not due until??04/06/22??and every 1??year(s)  ???Satisfied?(in the past 1 year)  ??? ??Satisfied?  ??? ? ? ?ADL Screening on??04/06/21.??Satisfied by Nicci Beard  ??? ? ? ?Blood Pressure Screening on??06/01/21.??Satisfied by Rody Feldman  ??? ? ? ?Body Mass Index Check on??06/01/21.??Satisfied by Rody Feldman  ??? ? ? ?Diabetes Screening on??05/17/21.??Satisfied by Neli Price  ??? ? ? ?Influenza Vaccine on??02/21/21.??Satisfied by Murtaza Schulz  ??? ? ? ?Lipid Screening on??03/19/21.??Satisfied by Sugey Aleman  ??? ? ? ?Obesity Screening on??06/01/21.??Satisfied by Rody Feldman  ?  Diagnostic Results  X-rays of the left hip obtained. ?Femoral neck system with a?partially-threaded headless compression screw is intact to the left hip.? There is no collapse of the femoral head.? No interval change in placement of hardware since surgical treatment.  ?  X-rays of the right shoulder were obtained demonstrating continued bony consolidation throughout the right distal clavicle.

## 2022-05-01 NOTE — HISTORICAL [facility] CERNER
This is a historical note converted from [employer]. Formatting and pictures may have been removed.  Please reference [employer] for original formatting and attached multimedia. Chief Complaint  polytrauma 2/2 fall  Reason for Consultation  Physiatry  History of Present Illness  Admit MD: Aftab Dickens MD  Consult Physiatry: Mack Perez MD  HPI: 39yo F with PMH of depression and anxiety presented to [facility] on 2/20?via EMS after falling 8 feet from a platform landing on gravel.??Work-up significant for right C7 transverse process fracture, right clavicle fracture, right rib fractures 1, 2, 4, 5, 6 and left rib fracture 1 and 2, bilateral pulmonary contusions with trace right hemopneumothorax and small left pneumothorax, T6-T9 transverse process fracture, and left femoral neck fracture. Neurosurgery consulted and?evaluated right C7 transverse process fracture. This was deemed a nonoperative fracture and recommended collar x2 weeks for ligament healing.?Underwent ORIF of left femoral neck fracture on 2/20. ?Postoperatively required BiPAP and transfer to ICU for acute hypoxemic respiratory failure. ?She required continued pulmonary toileting with Lasix.??Continued to decline in respiratory status developing ARDS and requiring intubation on 2/24. Initiated on vancomycin and continued Lasix IVP. She did?require vasopressors as well.??Underwent tracheostomy and PEG tube placement on 3/4. ?Vasopressors weaned and discontinued. ?Sutures removed from left hip on 3/5. ?On 3/1,?she required atropine x2 after bradycardic episodes 2/2 significant coughing likely vagal response. ?Transthoracic echocardiogram with normal EF noted on 3/12. ?Throughout inpatient ICU course, she required high PEEP and FiO2 ventilatory requirements. ?She had continued low-grade fevers requiring vasculitis workup. ?Blood cultures remained negative. ?Respiratory PCR on 03/13 was?negative.??Increased secretions around the trach grew normal respiratory moon  on culture. ?HIV negative. ?BERLIN equivocal. ?Double-stranded DNA antibody negative. ?Scl-70S negative. ?On 3/16, tracheostomy changed out for extra large per surgical hospitalist.??Began tolerating CPAP trials on 3/25. ?Respiratory status continued to improve, and she began tolerating trach collar trials on 3/30. ?Downsized tracheostomy to #6 on 3/31.??She started using?speaking valve and eating with regular diet on 4/3 without issue. She was tolerating pressure support via ventilator at night with trach collar during daytime hours.??Transferred [facility] ([facility] inpatient LTAC on 4/6.? She has been decannulated, and is?now eating a regular diet. ?She is participating with therapy.?Functional status includes sit to stand requiring minimal assistance. Supervision or setup needed for bed mobility, bed to WC transfer, grooming, supine to sit, and?WC mobility for 100ft. Patient was evaluated, accepted, and admitted to inpatient rehab to improve functional status. Transferred to [facility] on 5/3 without incident.?  5/4: Seen in patient room, seated in bed. Tells me that she was surveying a hunting camp that belonged to her father when she fell 2/2 rotten boards. They were hoping to sell the property. Reports increased anxiety and insomnia following her dads passing with subsequent medication of Lexapro and clonazepam at home. Tells me that Seroquel and buspar were added in the hospital and Lexapro switched to Cymbalta for additional pain control. Most of her pain was around her right clavicle, but with recent swelling the left hip pain has increased. She feels like the medication is working but she would like to wean off of medication as soon as she can tolerate. She does not like to take much medication. Reports good appetite and bowels last moving Saturday. Denies any issue with constipation prior. She is drinking alot of water throughout the day. OT arrives for session and discussing AD. Patient  reports trial of hemiwalker, but she has not been walking. She has been using stand pivots w/o AD for transfers. Ortho consulted for potential upgrade in WB status. VSSAF.  Review of Systems  Barriers to Discharge:  Social:Patient completed high school. ? 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 Denies history of  involvement.?  S.O. works full time. ?Patients mother will be main support system following discharge. ?She is currently employed. Patients 18 year old daughter can also assist since she will be off for the summer. ?Patients mother lives approximately 3 minutes away and can arrange to go back and forth during the day. ?Father recently passed away from COVID in January 2021.?  Medical: polytrauma (right C7 transverse process fracture, right clavicle fracture, right rib fractures 1, 2, 4, 5, 6 and left rib fracture 1 and 2, bilateral pulmonary contusions with trace right hemopneumothorax and small left pneumothorax, T6-T9 transverse process fracture, and left femoral neck fracture) 2/2 fall,?acute hypoxemic respiratory failure, ARDS, bradycardic episodes, tracheostomy and PEG tube placement,  Functional:  Prior Level of Fx:?independent ADLs, drives, cooks, does chores, does laundry, grocery shops, manages finances, manages own meds, hires someone for lawn maintenance; does not have medic alert. ?Patient has two children (18 years old and 2.5 years old). ?  Residence:?Patient lives with significant other in [place]. ?Patient lives in a single-story home with a threshold to gain entry. ?Patient has a walk-in shower with a built-in shower bench and a threshold to gain entry. ?*Does not have elevated toilet (Does not know if her house can accommodate a wheelchair-will need to see if this can be accommodated).?  DME: ? Patient has the following DME: none.?  Psychiatric:?Hx mental health/substance abuse: ?History of anxiety and depression per  chart ?/ History of smoking for approximately 15 years ?per chart  Depression/Anxiety: improving  busPIRone (BuSpar 5 mg oral tablet)5 mg, form: Tab, Oral, TID  clonazePAM 0.5 mg oral tablet)0.25 mg, form: Tab, Oral, BID  DULoxetine (Cymbalta 30 mg oral delayed release capsule)30 mg, form: Cap-DR, Oral, BID  Pain:?right clavicle and left hip  DULoxetine (Cymbalta 30 mg oral delayed release capsule)30 mg, form: Cap-DR, Oral, BID?  methocarbamol 500 mg oral tablet)500 mg, form: Tab, Oral, BID  methocarbamol, 500 mg, form: Tab, Oral, TID PRN for muscle pain  acetaminophen 325 mg oral tablet)650 mg, form: Tab, Oral, q6hr PRN for pain, mild  traMADol 50 mg oral tablet)50 mg, form: Tab, Oral, q4hr PRN for pain, moderate  oxyCODONE (Roxicodone 5 mg oral tablet)5 mg, form: Tab, Oral, q4hr PRN for pain, severe  Bowels/Bladder: last BM?5/1 per patient???  docusate (Colace 100 mg oral capsule)100 mg, form: Cap, Oral, BID  pantoprazole 40 mg, form: Tab-EC, Oral, Daily  Appetite: good  Sleep:?good with medication  QUEtiapine (Seroquel 25 mg oral tablet)50 mg, form: Tab, Oral, At Bedtime  ?  Physical Exam  Vitals & Measurements  T:?36.7? ?C (Oral)? TMIN:?36.4? ?C (Oral)? TMAX:?36.7? ?C (Oral)? HR:?81(Peripheral)? RR:?18? BP:?99/66? SpO2:?98%? WT:?80?kg? BMI:?33.73?  General:?well-developed, well-nourished, in no acute distress  Eye: EOMI, clear conjunctiva, eyelids normal  HENT:?normocephalic,??oropharynx and nasal mucosal surfaces moist  Neck: full range of motion, supple, ostomy site-healing-PRIYANKA  Respiratory:?equal chest rise, no SOB, no audible wheeze  Cardiovascular:?regular rate and rhythm, no edema, good pulses  Gastrointestinal:?soft, non-tender, non-distended, PEG site-healing-PRIYANKA  Musculoskeletal:?decreased ROM/strength to RUE (NWB)?and LLE (TTWB)?with generalized weakness  Integumentary: no rashes or skin lesions present, LLE incision site-healing-PRIYANKA,ostomy site-healing-PRIYANKA , PEG site-healing-PRIYANKA  Neurologic: cranial  nerves intact, no signs of peripheral neurological deficit, motor/sensory function intact  ?  Assessment/Plan  Orders:  methocarbamol, 500 mg, form: Tab, Oral, TID PRN for muscle pain, first dose 05/04/21 11:07:00 CDT  MD to Nurse, 05/04/21 11:16:00 CDT, update wound pictures including surgical sites (LLE, trach, PEG)  Wounds:?LLE incision site-healing-PRIYANKA,ostomy site-healing-PRIYANKA , PEG site-healing-PRIYANKA  S/p ORIF of left femoral neck fracture on 2/20  S/p tracheostomy and PEG tube placement on 3/4  S/p tracheostomy changed out for extra large on 3/14?(decannulated)  Precautions:?ROM as tolerated to RUE and LLE, NWB RUE, TTWB LLE  ?  Bracing: none  Swallowing:?Regular Diet  Function: Tolerating therapy. Continue PT/OT/RT  VTE Prophylaxis:?  enoxaparin (Lovenox 40 mg/0.4 mL subcutaneous solution)40 mg,?BID  Code Status:? FULL CODE?  Discharge:???Patient lives with significant other in [place]. ?Patient lives in a single-story home with a threshold to gain entry.? 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 Denies history of  involvement.?  S.O. works full time. ?Patients mother will be main support system following discharge. ?She is currently employed. Patients 18 year old daughter can also assist since she will be off for the summer. ?Patients mother lives approximately 3 minutes away and can arrange to go back and forth during the day. ?Father recently passed away from COVID in January 2021.?Date pending. ?  ?  ?   Problem List/Past Medical History  Ongoing  Closed displaced fracture of left femoral neck  Closed displaced fracture of shaft of right clavicle  Fever of unknown origin co-occurrent with human immunodeficiency virus infection  Hand pain, right  Obesity  Shingles  Tobacco user  Historical  Pregnant  Pregnant  Procedure/Surgical History  Change Tracheostomy Device in Trachea, External Approach (03/31/2021)  Bypass Trachea to Cutaneous with  Tracheostomy Device, Open Approach (2021)  Insertion of Feeding Device into Stomach, Percutaneous Approach (2021)  Insertion of Infusion Device into Superior Vena Cava, Percutaneous Approach (2021)  PEG Tube Insertion in Surgery (.) (2021)  Tracheostomy (.) (2021)  Insertion of Endotracheal Airway into Trachea, Via Natural or Artificial Opening (2021)  Respiratory Ventilation, Greater than 96 Consecutive Hours (2021)  ORIF Femur (2021)  Reposition Left Upper Femur with Internal Fixation Device, Open Approach (2021)   delivery only; (2018)   Section (None) (2018)  Extraction of Products of Conception, Low Cervical, Open Approach (2018)  ABD US  Biopsy of liver  Bronchoscope   delivery  Cholecystectomy  CT of abdomen  CT of head and sinuses  FESS - FSS sphenoidotomy  PEG  Rectilinear-scanning nuclear medicine system  rt knee repair  Tonsillectomy and adenoidectomy  Tracheostomy   Medications  Inpatient  acetaminophen 325 mg oral tablet, 325 mg= 1 tab(s), Oral, q4hr, PRN  acetaminophen 325 mg oral tablet, 650 mg= 2 tab(s), Oral, q6hr, PRN  Aquaphor, 1 argenis, TOP, BID, PRN  BD Lactinex oral tablet, chewable, 1 tab(s), Oral, Daily  BuSpar 5 mg oral tablet, 5 mg= 1 tab(s), Oral, TID  clonazePAM 0.5 mg oral tablet, 0.25 mg= 0.5 tab(s), Oral, BID  Colace 100 mg oral capsule, 100 mg= 1 cap(s), Oral, BID  Cymbalta 30 mg oral delayed release capsule, 30 mg= 1 cap(s), Oral, BID  Dulcolax Laxative 10 mg RECTAL suppository, 10 mg= 1 supp, WV (rectal), q3day, PRN  ferrous sulfate 325 mg (65 mg elemental iron) oral enteric coated tablet, 325 mg= 1 tab(s), Oral, BID  hydrALAZINE, 10 mg= 0.5 mL, IV Push, q4hr, PRN  labetalol, 10 mg= 2 mL, IV Push, q10min, PRN  lactulose 10 g/15 mL oral syrup, 20 gm= 30 mL, Oral, Every other day, PRN  Lopressor 1 mg/mL injectable solution, 10 mg= 10 mL, IV Push, q2hr, PRN  Lovenox 40 mg/0.4 mL subcutaneous  solution, 40 mg= 0.4 mL, Subcutaneous, BID  methocarbamol 500 mg oral tablet, 500 mg= 1 tab(s), Oral, BID  nitroglycerin 0.4 mg sublingual TAB, 0.4 mg= 1 tab(s), SL, q5min, PRN  ondansetron 4 mg oral tablet, disintegrating, 8 mg= 2 tab(s), Oral, q8hr, PRN  pantoprazole, 40 mg= 1 tab(s), Oral, Daily  Pepcid 20 mg oral tablet, 20 mg= 1 tab(s), Oral, BID, PRN  Robaxin, 500 mg= 1 tab(s), Oral, TID, PRN  Roxicodone 5 mg oral tablet, 5 mg= 1 tab(s), Oral, q4hr, PRN  Seroquel 25 mg oral tablet, 50 mg= 2 tab(s), PEG Tube, At Bedtime  Tessalon Perles, 100 mg= 1 cap(s), Oral, TID, PRN  traMADol 50 mg oral tablet, 50 mg= 1 tab(s), Oral, q4hr, PRN  Vistaril 50 mg oral capsule, 50 mg= 1 cap(s), Oral, At Bedtime  Home  Aquaphor, 1 argenis, TOP, BID, PRN  Balmex topical cream, TOP, BID  BD Lactinex oral tablet, chewable, Oral, Daily  BuSpar 5 mg oral tablet, 5 mg= 1 tab(s), Oral, TID  clonazePAM 0.5 mg oral tablet, 0.25 mg= 0.5 tab(s), Oral, BID  Colace 100 mg oral capsule, 100 mg= 1 cap(s), Oral, BID  Cymbalta 30 mg oral delayed release capsule, 30 mg= 1 cap(s), Oral, BID  ferrous sulfate 325 mg oral tablet, 325 mg, Oral, BID  Lovenox 40 mg/0.4 mL subcutaneous solution, 40 mg= 0.4 mL, Subcutaneous, BID  methocarbamol 500 mg oral tablet, 500 mg= 1 tab(s), Oral, BID  oxycodone 5 mg oral tablet, 5 mg= 1 tab(s), Oral, q4hr, PRN  pantoprazole, 40 mg, IV Piggyback, Daily  Seroquel 25 mg oral tablet, 50 mg= 2 tab(s), PEG Tube, At Bedtime  traMADol 50 mg oral tablet, 50 mg= 1 tab(s), Oral, q4hr, PRN  Tylenol, 500 mg= 15.63 mL, Oral, q4hr, PRN  Vistaril 50 mg oral capsule, 50 mg= 1 cap(s), Oral, At Bedtime  Zofran 2 mg/mL injectable solution, 4 mg= 2 mL, IV Push, q6hr, PRN  Allergies  No Known Medication Allergies  Social History  Abuse/Neglect  No, 05/03/2021  No, No, Yes, 04/06/2021  No, No, Yes, 02/21/2021  No, 02/20/2021  Tobacco  Cigarettes but not daily within the last 30 days, N/A, 10 per day. 15 year(s)., 05/03/2021  10 or more  cigarettes (1/2 pack or more)/day in last 30 days, No, Household tobacco concerns: No. Smokeless Tobacco Use: Never., 04/06/2021  10 or more cigarettes (1/2 pack or more)/day in last 30 days, No, 02/21/2021  10 or more cigarettes (1/2 pack or more)/day in last 30 days, N/A, 02/20/2021  Current every day smoker, Cigarettes, 10 per day. 15 year(s)., 12/09/2016  Family History  Diverticulitis of colon.: Father.  Hypothyroidism.: Mother.  Immunizations  Vaccine Date Status   COVID-19 MRNA, LNP-S, PF- Pfizer 02/01/2021 Given   COVID-19 MRNA, LNP-S, PF- Pfizer 01/11/2021 Given   tetanus/diphtheria/pertussis, acel(Tdap) 06/28/2018 Given   Lab Results  Test Name Test Result Date/Time   Sodium Lvl 137 mmol/L 05/04/2021 05:25 CDT   Potassium Lvl 3.9 mmol/L 05/04/2021 05:25 CDT   Chloride 104 mmol/L 05/04/2021 05:25 CDT   CO2 23 mmol/L 05/04/2021 05:25 CDT   Calcium Lvl 9.8 mg/dL 05/04/2021 05:25 CDT   Magnesium Lvl 2.06 mg/dL 05/04/2021 05:25 CDT   Glucose Lvl 90 mg/dL 05/04/2021 05:25 CDT   BUN 12.3 mg/dL 05/04/2021 05:25 CDT   Creatinine 0.62 mg/dL 05/04/2021 05:25 CDT   Est Creat Clearance Ser 89.40 mL/min 05/04/2021 06:31 CDT   eGFR-AA >60 05/04/2021 05:25 CDT   eGFR-ANTONY >60 mL/min/1.73 m2 05/04/2021 05:25 CDT   Bili Total 0.3 mg/dL 05/04/2021 05:25 CDT   Bili Direct 0.1 mg/dL 05/04/2021 05:25 CDT   Bili Indirect 0.20 mg/dL 05/04/2021 05:25 CDT   AST 17 unit/L 05/04/2021 05:25 CDT   ALT 25 unit/L 05/04/2021 05:25 CDT   Alk Phos 119 unit/L 05/04/2021 05:25 CDT   Total Protein 7.9 gm/dL 05/04/2021 05:25 CDT   Albumin Lvl 3.8 gm/dL 05/04/2021 05:25 CDT   Globulin 4.1 gm/dL (High) 05/04/2021 05:25 CDT   A/G Ratio 0.9 ratio (Low) 05/04/2021 05:25 CDT   Phosphorus 5.5 mg/dL (High) 05/04/2021 05:25 CDT   Iron Lvl 46 ug/dL (Low) 05/04/2021 05:25 CDT   Transferrin 252 mg/dL 05/04/2021 05:25 CDT   TIBC 286 ug/dL 05/04/2021 05:25 CDT   Iron Sat 16 % (Low) 05/04/2021 05:25 CDT   Ferritin Lvl 183.08 ng/mL 05/04/2021 05:25 CDT   CRP  High Sens 10.02 mg/L (High) 05/03/2021 02:35 CDT   Prealbumin 24.4 mg/dL 05/04/2021 05:25 CDT   UIBC 240 ug/dL 05/04/2021 05:25 CDT   WBC 7.9 x10(3)/mcL 05/04/2021 05:25 CDT   RBC 4.61 x10(6)/mcL 05/04/2021 05:25 CDT   Hgb 11.9 gm/dL (Low) 05/04/2021 05:25 CDT   Hct 38.6 % 05/04/2021 05:25 CDT   Platelet 310 x10(3)/[facility] 05/04/2021 05:25 CDT   MCV 83.7 fL 05/04/2021 05:25 CDT   MCH 25.8 pg (Low) 05/04/2021 05:25 CDT   MCHC 30.8 gm/dL (Low) 05/04/2021 05:25 CDT   RDW 17.1 % (High) 05/04/2021 05:25 CDT   MPV 9.2 fL 05/04/2021 05:25 CDT   Abs Neut 2.97 x10(3)/[facility] 05/04/2021 05:25 CDT   Neutro Auto 37.7 % (Low) 05/04/2021 05:25 CDT   Lymph Auto 50.1 % (High) 05/04/2021 05:25 CDT   Mono Auto 7.0 % 05/04/2021 05:25 CDT   Eos Auto 4.3 % 05/04/2021 05:25 CDT   Abs Eos 0.34 x10(3)/[facility] 05/04/2021 05:25 CDT   Basophil Auto 0.8 % 05/04/2021 05:25 CDT   Abs Neutro 2.97 x10(3)/[facility] 05/04/2021 05:25 CDT   Abs Lymph 3.95 x10(3)/[facility] 05/04/2021 05:25 CDT   Abs Mono 0.55 x10(3)/mcL 05/04/2021 05:25 CDT   Abs Baso 0.06 x10(3)/mcL 05/04/2021 05:25 CDT   NRBC% 0.0 % 05/04/2021 05:25 CDT   IG% 0.100 % 05/04/2021 05:25 CDT   IG# 0.0100 05/04/2021 05:25 CDT   Sed Rate 33 mm/hr (High) 05/03/2021 02:35 CDT   Diagnostic Results  (02/20/2021 14:15 CST XR Hip Left 2 Views)  Impression:  Comminuted minimally impacted fracture of the left femoral neck. [1]  ?  (02/20/2021 14:15 CST XR Pelvis 1 or 2 Views)  Impression:  Contrast is noted in the bladder. Left femoral neck fracture is  appreciated. [2]  ?  (02/20/2021 14:16 CST XR Chest 1 View)  Impression  Markedly prominent interstitial markings are noted throughout with  most focal opacification in the left lower lobe. Findings concerning  for atypical infectious process. No acute fracture appreciated.  ? [3]  ?  (02/20/2021 20:33 CST XR Clavicle Right)  IMPRESSION:?  Comminuted fractured distal clavicle..? [4]  ?  (02/20/2021 12:50 CST CT Thorax W Contrast)  IMPRESSION  1. ?Bilateral pulmonary  contusion with trace right hemopneumothorax  and miniscule volume left pneumothorax.  2. ?Acute fractures involving the right clavicle, bilateral 1st ribs,  as well as additional mildly displaced rib fractures.  3. ?Nondisplaced right C7 and T6-T9 transverse process fractures.  4. ?Mildly displaced left femoral neck fracture. [5]  ?  (02/20/2021 12:50 CST CT Head W/O Contrast)  IMPRESSION  No CT-evident acute intracranial abnormality. [6]  ?  (02/20/2021 13:22 CST CT Cervical Spine W/O Contrast)  Impression:  1. Nondisplaced fracture of the right transverse process of C7 best  seen on coronal reconstruction (series 606 image 20).  2. Ligament, spinal cord and/or vascular abnormalities cannot be  excluded on the basis of this examination. [7]  ?  (02/20/2021 15:44 CST CT Angio Neck W W/O Contrast)  IMPRESSION  1. ?No evidence of acute or focal vascular abnormality.  2. ?Additional post-traumatic changes are similar to CT evaluation  performed earlier today.  ? [8]  ?  (03/08/2021 14:11 CST CT Thorax W Contrast)  IMPRESSION:  1. Significant interval worsening of confluent bilateral groundglass  pulmonary airspace opacities, as detailed above. Findings are highly  suspected to represent sequela of respiratory failure, with  superimposed infectious or inflammatory process not definitively  excluded.  2. Bilateral low-density pleural effusions, small to moderate volume  on the right and small volume on the left. Increased subcutaneous soft  tissue edema of the imaged proximal bilateral lower extremities and  left greater than right flanks.  3. Postsurgical changes of interval tracheostomy, gastrostomy tube  placement, and left hip fixation. [9]  ?  RESPIRATORY CULTURE  Final - March 09, 2021 6:29 CST -?  Rare Normal respiratory moon  Pre - March 08, 2021 7:42 CST -?  No growth @ 24 hours  GS - March 07, 2021 7:23 CST - Rare Gram Positive Cocci , Quality 3+  ?  (03/23/2021 05:50 CDT XR Chest 1  View)  FINDINGS:  Examination reveals cardia mediastinal silhouette and pleural  parenchymal changes to be essentially unchanged as compared with the  previous exam persistent bilateral airspace opacities are again  identified in a similar distribution as compared with exam of March 18, 2021.  Tracheostomy cannula rib pains in place  IMPRESSION: No significant change [10]  ?  (04/12/2021 17:24 CDT US Venous Lower Extremity Left)  IMPRESSION:  No evidence of left lower extremity deep vein thrombosis. [11]  ?  (04/13/2021 17:32 CDT CT Thorax W W/O Contrast)  IMPRESSION:?  1. Limited exam with no central pulmonary embolism identified.  2. Decreased extent of bilateral consolidation and groundglass since  3/8/2021 with residual changes likely at least partially related to  late phase ARDS.  3. Moderate to large bilateral pleural effusions with small to  moderate pericardial effusion. [12]  ?  (04/22/2021 17:38 CDT XR Hand Right 2 Views)  IMPRESSION  No acute abnormality. [13]  ?  (04/22/2021 17:38 CDT XR Hip Left 2 Views)  FINDINGS: There has been open reduction and internal fixation of  proximal left femur fracture with satisfactory position and alignment.  There is no significant interval change. No new findings identified.  ? [14]  ?  (04/22/2021 17:38 CDT XR Clavicle Right)  FINDINGS: There is unchanged comminuted displaced fracture right  distal clavicle. Fracture line minimally extends into the distal  articular surface of the clavicle. There is no acromioclavicular joint  separation.  IMPRESSION:?  Right clavicular fracture without significant interval change.  ? [15]  ?  (04/26/2021 07:53 CDT XR Chest 2 Views)  IMPRESSION:  Persistent left midlung opacity and improved aeration right  ? [16]  ?  (05/01/2021 17:38 CDT US Venous Lower Extremity Left)  Impression: Negative left lower extremity venous Doppler ultrasound  for DVT. [17]  ?  (05/01/2021 14:39 CDT XR Femur Left 2 Views)  FINDINGS:?  No acute fracture or  malalignment is identified of the left femur.  Postsurgical changes of prior hardware fixation of a fracture of the  left femoral neck are identified. The imaged hardware is intact, with  no radiographic evidence of loosening, fracture, or screw migration. A  fracture line is no longer readily discernible, compatible with  interval healing. The left hip and left knee articulations are  congruent on the provided views. There is no aggressive-appearing bone  lesion or abnormal periosteal reaction. No soft tissue gas or  calcification. Bone mineralization is maintained.  IMPRESSION:  No acute osseous abnormality of the left femur. Postsurgical changes  of prior hardware fixation of a healed fracture of the left femoral  neck. No radiographic evidence of hardware-related complication.  ? [18]     [1]?XR Hip Left 2 Views; Pan Kramer MD 02/20/2021 14:15 CST  [2]?XR Pelvis 1 or 2 Views; Pan Kramer MD 02/20/2021 14:15 CST  [3]?XR Chest 1 View; Pan Kramer MD 02/20/2021 14:16 CST  [4]?XR Clavicle Right; Alcides Espana MD 02/20/2021 20:33 CST  [5]?CT Thorax W Contrast; Dony Rodriguez MD 02/20/2021 12:50 CST  [6]?CT Head W/O Contrast; Dony Rodriguez MD 02/20/2021 12:50 CST  [7]?CT Cervical Spine W/O Contrast; Pan Kramer MD 02/20/2021 13:22 CST  [8]?CT Angio Neck W W/O Contrast; Dony Rodriguez MD 02/20/2021 15:44 CST  [9]?CT Thorax W Contrast; Favian Jha MD 03/08/2021 14:11 CST  [10]?XR Chest 1 View; Fred Chase MD 03/23/2021 05:50 CDT  [11]?US Venous Lower Extremity Left; Favian Jha MD 04/12/2021 17:24 CDT  [12]?CT Thorax W W/O Contrast; Preet Bloom MD 04/13/2021 17:32 CDT  [13]?XR Hand Right 2 Views; Dony Rodriguez MD 04/22/2021 17:38 CDT  [14]?XR Hip Left 2 Views; Alcides Espana MD 04/22/2021 17:38 CDT  [15]?XR Clavicle Right; Alcides Espana MD 04/22/2021 17:38 CDT  [16]?XR Chest 2 Views; Dora NEGRON, Salima Keating 04/26/2021  07:53 CDT  [17]?US Venous Lower Extremity Left; Jerson NEGRON, Collin ESPINOSA 05/01/2021 17:38 CDT  [18] XR Femur Left 2 Views; Abhijeet NEGRON, Favian Burroughs 05/01/2021 14:39 CDT

## 2022-05-01 NOTE — HISTORICAL [facility]
This is a historical note converted from Barry. Formatting and pictures may have been removed.  Please reference Barry for original formatting and attached multimedia. Chief Complaint  4 month follow up left distal femoral neck fracture, closed 2/20/21-5/21/21. Patient reports a constant sharp/throbbing pain in left hip, States 9/10 right now,and intermitten sharp pain in clavicle, states 4/10 right now  History of Present Illness  Patient presents the office today with her daughter for continued follow-up for her right clavicle fracture and left femoral neck fracture. ?She underwent surgical treatment for her displaced left femoral neck approximately 4 months ago on 2/20/2021.? I closed treated her right clavicle fracture.? The patient?has been weightbearing on the left lower extremity for a little over a month.? She states she is having pain all the time in her left hip including at rest?when attempting to ambulate?when moving the leg.? She has run out of pain medication and states she was in tears.? She denies fever/chills.  Review of Systems  14 point review of systems completed and negative unless otherwise stated above  Physical Exam  Vitals & Measurements  T:?99.4? ?F (Oral)? HR:?92(Peripheral)? BP:?113/79?  HT:?154.00?cm? WT:?83.400?kg? BMI:?35.17? LMP:?01/20/2021 00:00 CST?  Patient seen and examined. ?Resting in a wheelchair.??She is tearful multiple times throughout todays office visit.  Right upper extremity examined.? Patient has good range of motion to the right shoulder.?Minimal tenderness to palpation over the right distal clavicle.  Right lower extremity examined.? Patient is able to stand from her wheelchair without assistive device. ?Surgical incision well-healed to the left hip.? There is no swelling, erythema, or ecchymosis.? Compartments of the left lower extremity are soft and compressible. ?Patient has?active range of motion intact in the knee and the ankle. ?Capillary refill brisk to all  toes.? There is pain with passive motion internal and external rotation of the left hip.  Assessment/Plan  Clavicle fracture?S42.009A  ?Patient states that she is having pain in her left hip at all times.? I will refill her narcotic pain medication today.? I did discuss the case with Dr. Peña?from the clinic today.? We will obtain ESR and CRP.? The patient will then follow-up in his office in 2 weeks.? As we have discussed in the past she understands?that she will either continue with conservative treatment of the left hip or?she may need to go on to a left total hip replacement?if the femoral head does not survive.? She understands and is agreeable with this plan.? She can continue weight-bear as tolerated to the right upper extremity and left lower extremity.? All questions were answered for the patient today.  Ordered:  Clinic Follow up, *Est. 07/06/21 3:00:00 CDT, Order for future visit, Clavicle fracture  Fracture of neck of left femur, [facility]  Office/Outpatient Visit Level 2 Established [id number] PC, Clavicle fracture  Fracture of neck of left femur, [facility], 06/22/21 8:45:00 CDT  ?  Fracture of neck of left femur?S72.002A  Ordered:  Clinic Follow up, *Est. 07/06/21 3:00:00 CDT, Order for future visit, Clavicle fracture  Fracture of neck of left femur, [facility]  Office/Outpatient Visit Level 2 Established [id number] PC, Clavicle fracture  Fracture of neck of left femur, [facility], 06/22/21 8:45:00 CDT  ?  Referrals  Clinic Follow up, *Est. 07/06/21 3:00:00 CDT, Order for future visit, Clavicle fracture  Fracture of neck of left femur, [facility]   Problem List/Past Medical History  Ongoing  ARDS (adult respiratory distress syndrome)  Closed displaced fracture of left femoral neck  Closed displaced fracture of shaft of right clavicle  Depression with anxiety  Fracture of transverse process of cervical vertebra  Fracture of transverse process of thoracic vertebra  Hand  pain, right  Multiple rib fractures  Obesity  Pneumothorax  S/P percutaneous endoscopic gastrostomy (PEG) tube placement  Shingles  Status post tracheostomy  Tobacco user  Historical  Fever of unknown origin co-occurrent with human immunodeficiency virus infection  Pregnant  Pregnant  Procedure/Surgical History  Change Tracheostomy Device in Trachea, External Approach (2021)  Bypass Trachea to Cutaneous with Tracheostomy Device, Open Approach (2021)  Insertion of Feeding Device into Stomach, Percutaneous Approach (2021)  Insertion of Infusion Device into Superior Vena Cava, Percutaneous Approach (2021)  PEG Tube Insertion in Surgery (.) (2021)  Tracheostomy (.) (2021)  Insertion of Endotracheal Airway into Trachea, Via Natural or Artificial Opening (2021)  Respiratory Ventilation, Greater than 96 Consecutive Hours (2021)  ORIF Femur (2021)  Reposition Left Upper Femur with Internal Fixation Device, Open Approach (2021)   delivery only; (2018)   Section (None) (2018)  Extraction of Products of Conception, Low Cervical, Open Approach (2018)  ABD US  Biopsy of liver  Bronchoscope   delivery  Cholecystectomy  CT of abdomen  CT of head and sinuses  FESS - FSS sphenoidotomy  PEG  Rectilinear-scanning nuclear medicine system  rt knee repair  Tonsillectomy and adenoidectomy  Tracheostomy   Medications  acetaminophen-oxycodone 325 mg-10 mg oral tablet, 1 tab(s), Oral, q6hr  clonazePAM 0.5 mg oral tablet, 0.25 mg= 0.5 tab(s), Oral, BID  Cymbalta 20 mg oral delayed release capsule, 60 mg= 3 cap(s), Oral, BID  ferrous sulfate 325 mg (65 mg elemental iron) oral tablet, 325 mg, Oral, BID  gabapentin 400 mg oral capsule, 400 mg= 1 cap(s), Oral, q8hr  tiZANidine 4 mg oral tablet, 4 mg= 1 tab(s), Oral, TID  Allergies  No Known Allergies  No Known Medication Allergies  Social History  Abuse/Neglect  No, No, Yes,  06/22/2021  Tobacco  Cigarettes but not daily within the last 30 days, No, 06/22/2021  Family History  Diverticulitis of colon.: Father.  Hypothyroidism.: Mother.  Immunizations  Vaccine Date Status   COVID-19 MRNA, LNP-S, PF- Pfizer 02/01/2021 Given   COVID-19 MRNA, LNP-S, PF- Pfizer 01/11/2021 Given   tetanus/diphtheria/pertussis, acel(Tdap) 06/28/2018 Given   Health Maintenance  Health Maintenance  ???Pending?(in the next year)  ??? ??OverDue  ??? ? ? ?Influenza Vaccine due??10/01/20??and every 1??day(s)  ??? ? ? ?Smoking Cessation due??01/01/21??Variable frequency  ??? ? ? ?Alcohol Misuse Screening due??01/02/21??and every 1??year(s)  ??? ??Due?  ??? ? ? ?Lipid Screening due??06/22/21??Unknown Frequency  ??? ??Due In Future?  ??? ? ? ?Obesity Screening not due until??01/01/22??and every 1??year(s)  ??? ? ? ?ADL Screening not due until??04/06/22??and every 1??year(s)  ??? ? ? ?Blood Pressure Screening not due until??06/01/22??and every 1??year(s)  ??? ? ? ?Body Mass Index Check not due until??06/01/22??and every 1??year(s)  ???Satisfied?(in the past 1 year)  ??? ??Satisfied?  ??? ? ? ?ADL Screening on??04/06/21.??Satisfied by Nicci Beard  ??? ? ? ?Blood Pressure Screening on??06/22/21.??Satisfied by Ana Rosa Soto  ??? ? ? ?Body Mass Index Check on??06/22/21.??Satisfied by Ana Rosa Soto  ??? ? ? ?Depression Screening on??06/22/21.??Satisfied by Ana Rosa Soto  ??? ? ? ?Diabetes Screening on??05/17/21.??Satisfied by Neli Price  ??? ? ? ?Influenza Vaccine on??02/21/21.??Satisfied by Murtaza Schulz  ??? ? ? ?Lipid Screening on??03/19/21.??Satisfied by Sugey Aleman  ??? ? ? ?Obesity Screening on??06/22/21.??Satisfied by Ana Rosa Soto  ?  Diagnostic Results  X-rays of the left?hip?were obtained.? All hardware for the femoral neck is in the appropriate position.? There is no collapse of the femoral head.? There is no degenerative changes seen throughout the left hip.  X-rays of the right  clavicle were obtained.? Bone with continued consolidation.? Alignment unchanged.

## 2022-05-02 NOTE — HISTORICAL [facility]
This is a historical note converted from Barry. Formatting and pictures may have been removed.  Please reference Barry for original formatting and attached multimedia. Chief Complaint  Left total hip conversion, global 10/13/21-1/11/22  History of Present Illness  41-year-old female presents here to the clinic today for conversion to left total hip arthroplasty. ?She is ambulating with a cane today here in clinic. ?She does have some complaints of pain?which she states is worsened within the last couple of days.? She states that therapy she has not been able to do the exercises that she has been before.? Her pain is more to the lateral side of the left hip.? She also has?sharp shooting pain down her?leg as well.? She is currently taking Norco 7.5s in which she needs a refill?of her medication.? She denies taking any anti-inflammatories to help with these?other pains.? Currently she is in physical therapy 3 times a week?for range of motion, stability, and strengthening.  Review of Systems  Denies fevers, chills, chest pain, shortness of breath. Comprehensive review of systems performed and otherwise negative except as noted in HPI  Physical Exam  Vitals & Measurements  T:?98.2? ?F (Oral)? HR:?109(Peripheral)? BP:?140/87?  HT:?154.00?cm? WT:?83.900?kg? BMI:?35.38?  General: awake and alert, no acute distress, healthy appearing  ?Head and Neck: Head atraumatic/normocephalic. Moist MM  ?CV: brisk cap refill  ?Lungs: non-labored breathing, w/o cough or SOB  ?Skin: no rashes present, warm to touch  ?Neuro: sensation grossly intact distally  ?  Left hip exam:  Left hip incision clean dry and intact. No erythema, drainage, or signs of infection  ?No swelling distally. No signs of DVT  Brisk cap refill right ?foot  ?Sensation intact distally to right foot  ?Positive FHL/EHL/gastrocsoleus/tib ant?  Positive straight leg raise  Assessment/Plan  1.?Aftercare following joint replacement?Z47.1  ?Patient presents to clinic today  6 weeks status post?left total hip arthroplasty.? Overall the patient is doing well. ?Her hip is stable upon examination and x-rays today here in clinic.? She does have a?positive straight leg?test upon examination today here in clinic.? In regards to these pains and?lower lumbar radiculopathy?we will prescribe her Mobic?and?additional physical therapy for her lower back.? We will also refill?her Norco 7.5s today here in clinic for pain.? She is to follow-up in 2 months?to reassess the hip.? She was educated that if she continued to have these pains or they worsen despite medication and physical therapy she is to call or come in sooner.  2.?Lumbar radiculopathy?M54.16,?Lumbar radiculopathy?M54.16  ?see above.  Orders:  acetaminophen-HYDROcodone, 1 tab(s), Oral, q6hr, PRN PRN as needed for pain, # 28 tab(s), 0 Refill(s), Pharmacy: [facility], 154, cm, Height/Length Dosing, 11/30/21 13:00:00 CST, 83.9, kg, Weight Dosing, 11/30/21 13:00:00 CST  meloxicam, 15 mg = 1 tab(s), Oral, Daily, # 30 tab(s), 0 Refill(s), Pharmacy: [facility], 154, cm, Height/Length Dosing, 11/30/21 13:00:00 CST, 83.9, kg, Weight Dosing, 11/30/21 13:00:00 CST  The above findings, diagnostics, and treatment plan were discussed with Dr. Michel Peña who is in agreement with the plan of care.  Referrals  PT/OT Ambulatory Referral, Specialty: Physical Therapy, Start: 11/30/21 13:26:00 CST, 6, Home Exercise program  No Dry Needling  Therapeutic Excercise, Instructions: ****LUMBAR PT ORDERS********, Aftercare following joint replacement  Lumbar radiculopathy, 2 X Week  Clinic Follow up, *Est. 01/30/22 3:00:00 CST, Order for future visit, Aftercare following joint replacement  Lumbar radiculopathy, [facility]   Problem List/Past Medical History  Ongoing  ARDS (adult respiratory distress syndrome)  Avascular necrosis of bone of left hip  Closed displaced fracture of left femoral neck  Closed displaced fracture of shaft of  right clavicle  Depression with anxiety  Fracture of transverse process of cervical vertebra  Fracture of transverse process of thoracic vertebra  Hand pain, right  Multiple rib fractures  Obesity  Pneumothorax  S/P percutaneous endoscopic gastrostomy (PEG) tube placement  Shingles  Status post tracheostomy  Tobacco user  Historical  Fever of unknown origin co-occurrent with human immunodeficiency virus infection  Pregnant  Pregnant  Procedure/Surgical History  Replacement of Left Hip Joint with Metal on Polyethylene Synthetic Substitute, Cemented, Open Approach (10/13/2021)  Total Hip Revision/Removal Robotic Assist (Left) (10/13/2021)  Arthrocentesis, aspiration and/or injection, major joint or bursa (eg, shoulder, hip, knee, subacromial bursa); without ultrasound guidance (2021)  Hip Aspiration (Left) (2021)  Introduction of Anesthetic Agent into Joints, Percutaneous Approach (2021)  Change Tracheostomy Device in Trachea, External Approach (2021)  Bypass Trachea to Cutaneous with Tracheostomy Device, Open Approach (2021)  Insertion of Feeding Device into Stomach, Percutaneous Approach (2021)  Insertion of Infusion Device into Superior Vena Cava, Percutaneous Approach (2021)  PEG Tube Insertion in Surgery (.) (2021)  Tracheostomy (.) (2021)  Insertion of Endotracheal Airway into Trachea, Via Natural or Artificial Opening (2021)  Respiratory Ventilation, Greater than 96 Consecutive Hours (2021)  ORIF Femur (2021)  Reposition Left Upper Femur with Internal Fixation Device, Open Approach (2021)  femur surgery (2020)   delivery only; (2018)   Section (None) (2018)  Extraction of Products of Conception, Low Cervical, Open Approach (2018)  ABD US  Biopsy of liver  Bronchoscope   delivery  Cholecystectomy  CT of abdomen  CT of head and sinuses  FESS - FSS sphenoidotomy  PEG  Rectilinear-scanning  nuclear medicine system  rt knee repair  Tonsillectomy and adenoidectomy  Tracheostomy   Medications  Albuterol (Eqv-ProAir HFA) 90 mcg/inh inhalation aerosol, 2 puff(s), INH, q6hr  Alyacen 1/35 oral tablet, 1 tab(s), Oral, Daily  aspirin 81 mg oral Delayed Release (EC) tablet, 81 mg= 1 tab(s), Oral, BID  azithromycin 250 mg oral tablet, 500 mg= 2 tab(s), Oral, Once  clonazePAM 0.5 mg oral tablet, 0.25 mg= 0.5 tab(s), Oral, BID,? ?Still taking, not as prescribed: as needed  Flovent  mcg/inh inhalation aerosol, 2 puff(s), INH, BID  gabapentin 300 mg oral capsule, 300 mg= 1 cap(s), Oral, TID  levothyroxine 88 mcg (0.088 mg) oral tablet, 88 mcg= 1 tab(s), Oral, qAM  Lexapro 10 mg oral tablet, 10 mg= 1 tab(s), Oral, Daily  Mobic 15 mg oral tablet, 15 mg= 1 tab(s), Oral, Daily  Multi-Day Plus Minerals oral tablet, 1 tab(s), Oral, Daily  Neurontin 300 mg oral capsule, 300 mg= 1 cap(s), Oral, BID  Norco 7.5 mg-325 mg oral tablet, 1 tab(s), Oral, q6hr, PRN  Norco 7.5 mg-325 mg oral tablet, 1 tab(s), Oral, q6hr, PRN  Robaxin 750 mg oral tablet, 750 mg= 1 tab(s), Oral, q6hr, PRN  Template Non-Formulary Oral LIQUID, 1 dropper, Oral, Daily  Vitamin D3, 1 tab, Oral, Daily  Allergies  tiZANidine?(Rash)  Social History  Abuse/Neglect  No, No, Yes, 11/30/2021  Alcohol  Current, Wine, 1-2 times per month, 09/23/2021  Employment/School  Employed, 10/06/2021  Home/Environment  Lives with Children, Spouse., 10/06/2021  Nutrition/Health  Regular, 10/06/2021  Spiritual/Cultural  [employer], 10/06/2021  Substance Use  Never, 09/23/2021  Tobacco  Former smoker, quit more than 30 days ago, No, 11/30/2021  Family History  Diverticulitis of colon.: Father.  Hypothyroidism.: Mother.  Immunizations  Vaccine Date Status   COVID-19 MRNA, LNP-S, PF- Pfizer 02/01/2021 Given   COVID-19 MRNA, LNP-S, PF- Pfizer 01/11/2021 Given   tetanus/diphtheria/pertussis, acel(Tdap) 06/28/2018 Given   Health Maintenance  Health Maintenance  ???Pending?(in the  next year)  ??? ??OverDue  ??? ? ? ?Smoking Cessation due??01/01/21??Variable frequency  ??? ? ? ?Alcohol Misuse Screening due??01/02/21??and every 1??year(s)  ??? ??Due?  ??? ? ? ?Lipid Screening due??11/30/21??Unknown Frequency  ??? ??Due In Future?  ??? ? ? ?Obesity Screening not due until??01/01/22??and every 1??year(s)  ??? ? ? ?ADL Screening not due until??06/23/22??and every 1??year(s)  ??? ? ? ?Cervical Cancer Screening not due until??10/03/22??and every 3??year(s)  ???Satisfied?(in the past 1 year)  ??? ??Satisfied?  ??? ? ? ?ADL Screening on??06/23/21.??Satisfied by Mohini Hough  ??? ? ? ?Blood Pressure Screening on??11/30/21.??Satisfied by [place]  ??? ? ? ?Body Mass Index Check on??11/30/21.??Satisfied by Radha Schmitt  ??? ? ? ?Depression Screening on??11/30/21.??Satisfied by Radha Schmitt  ??? ? ? ?Diabetes Screening on??10/15/21.??Satisfied by Silvino Orozco  ??? ? ? ?Influenza Vaccine on??11/30/21.??Satisfied by [place] Radha  ??? ? ? ?Lipid Screening on??03/19/21.??Satisfied by Sugey Aleman  ??? ? ? ?Obesity Screening on??11/30/21.??Satisfied by Radha Schmitt  ?  Diagnostic Results  AP &?lateral?left hip?reviewed. ?Patients implants well fixed with no signs of loosening or subsidence noted.

## 2022-05-02 NOTE — HISTORICAL [facility] CERNER
This is a historical note converted from [employer]. Formatting and pictures may have been removed.  Please reference [place] for original formatting and attached multimedia. Chief Complaint  Patient presents for PRE OP conversion left hip 10/13/21. Patient reports having constant pain in left hip, states experiencing sharp pain in left hip. Difficulty walking. Unable to apply full weight on left hip/leg.  History of Present Illness  41-year-old female presents for follow-up of?avascular his left hip. ?Patient has had significant severe pain the left hip. ?She has tried nonoperative measures and anti-inflammatories obtain modification with use of a cane.? Patient feels?as though she has reached a point of disability and would like to proceed with surgical intervention.  Review of Systems  Denies fevers, chills, chest pain, shortness of breath. Comprehensive review of systems performed and otherwise negative except as noted in HPI.  Physical Exam  Vitals & Measurements  T:?98.9? ?F (Oral)? HR:?77(Peripheral)? BP:?101/70?  HT:?154.94?cm? WT:?98.400?kg? BMI:?40.99?  General: No acute distress, alert and oriented, healthy appearing?  HEENT: Head is atraumatic, mucous membranes are moist?  Cardiovascular: Brisk capillary refill  Lungs: Breathing non-labored?  Skin: no rashes appreciated?  Neurologic: Sensation is grossly intact distally  ?  Left hip:  Patient current a wheelchair. ?She has a significantly antalgic gait. ?Any rotation including internal and external Tatian cause of significant and severe pain the left hip. ?She has a positive Stinchfield. ?No significant ligament discrepancy.  Assessment/Plan  1.?Avascular necrosis of bone of left hip?M87.052  At this point the patient is tried and failed all conservative management with regards to their left hip. ?They have tried and failed nonoperative management including: Anti-inflammatories, activity modification, and assistive devices. All of these have failed to  completely remove their pain. They have pain putting on shoes and socks, getting in and out of a car, as well as walking on level ground. Their hip pain is affecting activities of daily living They feel that theyve reached a point of disability with regards to their hip. X-rays reveal advanced, end-stage degenerative osteoarthritis as noted by subchondral sclerosis, joint space narrowing, and periarticular osteophytes. The patient would like to proceed with surgical intervention and would be a good candidate for total hip replacement.  Total hip arthroplasty procedure, alternatives, risks, and benefits were discussed in detail. The risks including but not limited to: infection, need for revision surgery, pain, swelling, loosening, injury to surrounding neurovascular structures, stiffness, incomplete resolution of pain, limb length discrepancy, DVT, PE, and death were discussed in detail. Despite these risks, the patient would like to proceed with surgical intervention. All questions were answered, no guarantees made. Will plan for left LAVERNE on 3 weeks.?  Patient is a conversion total arthroplasty left side. ?Given this fact as well as her significant imitations with range of motion?and mobility currently, expect her to be in the hospital 48 to 72 hours. ?Chance of needing placement upon discharge.   Problem List/Past Medical History  Ongoing  ARDS (adult respiratory distress syndrome)  Avascular necrosis of bone of left hip  Closed displaced fracture of left femoral neck  Closed displaced fracture of shaft of right clavicle  Depression with anxiety  Fracture of transverse process of cervical vertebra  Fracture of transverse process of thoracic vertebra  Hand pain, right  Multiple rib fractures  Obesity  Pneumothorax  S/P percutaneous endoscopic gastrostomy (PEG) tube placement  Shingles  Status post tracheostomy  Tobacco user  Historical  Fever of unknown origin co-occurrent with human immunodeficiency virus  infection  Pregnant  Pregnant  Procedure/Surgical History  Arthrocentesis, aspiration and/or injection, major joint or bursa (eg, shoulder, hip, knee, subacromial bursa); without ultrasound guidance (2021)  Hip Aspiration (Left) (2021)  Introduction of Anesthetic Agent into Joints, Percutaneous Approach (2021)  Change Tracheostomy Device in Trachea, External Approach (2021)  Bypass Trachea to Cutaneous with Tracheostomy Device, Open Approach (2021)  Insertion of Feeding Device into Stomach, Percutaneous Approach (2021)  Insertion of Infusion Device into Superior Vena Cava, Percutaneous Approach (2021)  PEG Tube Insertion in Surgery (.) (2021)  Tracheostomy (.) (2021)  Insertion of Endotracheal Airway into Trachea, Via Natural or Artificial Opening (2021)  Respiratory Ventilation, Greater than 96 Consecutive Hours (2021)  ORIF Femur (2021)  Reposition Left Upper Femur with Internal Fixation Device, Open Approach (2021)  femur surgery (2020)   delivery only; (2018)   Section (None) (2018)  Extraction of Products of Conception, Low Cervical, Open Approach (2018)  ABD US  Biopsy of liver  Bronchoscope   delivery  Cholecystectomy  CT of abdomen  CT of head and sinuses  FESS - FSS sphenoidotomy  PEG  Rectilinear-scanning nuclear medicine system  rt knee repair  Tonsillectomy and adenoidectomy  Tracheostomy   Medications  acetaminophen-oxycodone 325 mg-10 mg oral tablet, 1 tab(s), Oral, q6hr,? ?Not taking  acetaminophen-oxycodone 325 mg-5 mg oral tablet, 1 tab(s), Oral, q8hr,? ?Not taking  Alyacen 1/35 oral tablet, 1 tab(s), Oral, Daily  clonazePAM 0.5 mg oral tablet, 0.25 mg= 0.5 tab(s), Oral, BID,? ?Still taking, not as prescribed: Once a day!  Cymbalta 20 mg oral delayed release capsule, 60 mg= 3 cap(s), Oral, BID,? ?Not taking  doxycycline hyclate 100 mg oral capsule, 100 mg= 1 cap(s), Oral,  BID,? ?Not taking  DULoxetine 30 mg oral delayed release capsule, 30 mg= 1 cap(s), Oral, BID,? ?Not taking  ferrous sulfate 325 mg (65 mg elemental iron) oral tablet, 325 mg, Oral, BID,? ?Not taking  gabapentin 400 mg oral capsule, 400 mg= 1 cap(s), Oral, q8hr,? ?Not taking  hydrOXYzine pamoate 25 mg oral capsule, 25 mg= 1 cap(s), Oral, qPM,? ?Not taking  levothyroxine 88 mcg (0.088 mg) oral tablet, 88 mcg= 1 tab(s), Oral, qAM  Lexapro 10 mg oral tablet, 10 mg= 1 tab(s), Oral, Daily  meloxicam 15 mg oral tablet, See Instructions  methocarbamol 500 mg oral tablet, 500 mg= 1 tab(s), Oral, TID,? ?Not taking  Multi-Day Plus Minerals oral tablet, 1 tab(s), Oral, Daily  Percocet 5/325 oral tablet, 1 tab(s), Oral, q6hr, PRN,? ?Not taking: Last Dose Date/Time Unknown  Template Non-Formulary Oral LIQUID, 1 dropper, Oral, Daily  tiZANidine 4 mg oral tablet, 4 mg= 1 tab(s), Oral, TID,? ?Not taking  Allergies  tiZANidine?(Rash)  Social History  Abuse/Neglect  No, No, Yes, 09/23/2021  Alcohol  Current, Wine, 1-2 times per month, 09/23/2021  Substance Use  Never, 09/23/2021  Tobacco  Former smoker, quit more than 30 days ago, No, 09/23/2021  Family History  Diverticulitis of colon.: Father.  Hypothyroidism.: Mother.  Immunizations  Vaccine Date Status   COVID-19 MRNA, LNP-S, PF- Pfizer 02/01/2021 Given   COVID-19 MRNA, LNP-S, PF- Pfizer 01/11/2021 Given   tetanus/diphtheria/pertussis, acel(Tdap) 06/28/2018 Given   Health Maintenance  Health Maintenance  ???Pending?(in the next year)  ??? ??OverDue  ??? ? ? ?Smoking Cessation due??01/01/21??Variable frequency  ??? ? ? ?Alcohol Misuse Screening due??01/02/21??and every 1??year(s)  ??? ??Due?  ??? ? ? ?Lipid Screening due??09/23/21??Unknown Frequency  ??? ??Due In Future?  ??? ? ? ?Obesity Screening not due until??01/01/22??and every 1??year(s)  ??? ? ? ?ADL Screening not due until??06/23/22??and every 1??year(s)  ???Satisfied?(in the past 1 year)  ??? ??Satisfied?  ??? ? ? ?ADL  Screening on??06/23/21.??Satisfied by Mohini Hough.  ??? ? ? ?Blood Pressure Screening on??09/23/21.??Satisfied by Ana Rosa Soto  ??? ? ? ?Body Mass Index Check on??09/23/21.??Satisfied by Ana Rosa Soto  ??? ? ? ?Depression Screening on??09/23/21.??Satisfied by Ana Rosa Soto  ??? ? ? ?Diabetes Screening on??05/17/21.??Satisfied by Neli Price  ??? ? ? ?Influenza Vaccine on??09/23/21.??Satisfied by Ana Rosa Soto  ??? ? ? ?Lipid Screening on??03/19/21.??Satisfied by Sugey Aleman  ??? ? ? ?Obesity Screening on??09/23/21.??Satisfied by Ana Rosa Soto  ?  Diagnostic Results  Sitting standing lumbar spine x-rays reviewed today for preoperative valuation only.

## 2022-05-02 NOTE — HISTORICAL [facility]
This is a historical note converted from [employer]. Formatting and pictures may have been removed.  Please reference [place] for original formatting and attached multimedia. Chief Complaint  L total hip conversion, global 10/13/21  History of Present Illness  41-year-old female presents here to the clinic today 2 weeks status post conversion to left total hip arthroplasty. ?Overall the patient is doing well. ?She is ambulating with a walker today here in clinic.? In regards to her pain she is currently taking Percocet fives, Robaxin, and gabapentin which is helping the pain. ?She is also in physical therapy in which she is attending twice a week.? She has had no complications with her incision site such as drainage, bleeding, and or?signs of infection.? She does complain of bruising noted to the most distal aspect of the incision site today here in clinic.? She also has a slight blister noted to where the tape has been in contact with her skin.  Review of Systems  Denies fevers, chills, chest pain, shortness of breath. Comprehensive review of systems performed and otherwise negative except as noted in HPI  Physical Exam  Vitals & Measurements  T:?36.2? ?C (Oral)? HR:?86(Peripheral)? BP:?115/73?  HT:?154.00?cm? WT:?83.900?kg? BMI:?35.38?  General: awake and alert, no acute distress, healthy appearing  ?Head and Neck: Head atraumatic/normocephalic. Moist MM  ?CV: brisk cap refill  ?Lungs: non-labored breathing, w/o cough or SOB  ?Skin: no rashes present, warm to touch  ?Neuro: sensation grossly intact distally  ?  Left hip exam:  Left hip incision clean dry and intact; staples intact. No erythema, drainage, or signs of infection. ?Ecchymosis noted to the most distal aspect of the incision site?at the hip.? Small?reddened area, which was once a blister?noted under tape?healing at this time.  ?No swelling distally. No signs of DVT  Brisk cap refill right ?foot  ?Sensation intact distally to right foot  ?Positive  FHL/EHL/gastrocsoleus/tib ant  Assessment/Plan  1.?Aftercare following joint replacement?Z47.1  ?Patient presents to clinic today 2 weeks status post conversion to left total hip arthroplasty. ?Overall the patient is improving. ?She is ambulating with a walker today here in clinic.? I have a stable upon examination and x-rays today here in clinic.? Staples are removed and incision is healing very nicely.? She is educated that she can now shower without vigorously scrubbing the incision sites.? She is educated to continue physical therapy for strengthening, range of motion, and stability.? Her Percocet, Robaxin, and gabapentin were refilled today here in clinic. ?She was educated though that next time she will be?down to [employer] [facility] and states understanding.? We will have her follow-up in?1 month with x-rays to assess the site of her hip.  Orders:  acetaminophen-oxyCODONE, 1 tab(s), Oral, q6hr, PRN PRN pain  for pain, X 1 week(s), # 18 tab(s), 0 Refill(s), Pharmacy: [facility], 154, cm, Height/Length Dosing, 10/28/21 9:53:00 CDT, 83.9, kg, Weight Dosing, 10/28/21 9:53:00 CDT  gabapentin, 300 mg = 1 cap(s), Oral, TID, Take 1 a Day for 3 Days, # 90 cap(s), 0 Refill(s), Pharmacy: [facility], 154, cm, Height/Length Dosing, 10/28/21 9:53:00 CDT, 83.9, kg, Weight Dosing, 10/28/21 9:53:00 CDT  methocarbamol, 1,500 mg = 2 tab(s), Oral, TID, X 7 day(s), # 42 tab(s), 0 Refill(s), Pharmacy: [facility], 154, cm, Height/Length Dosing, 10/28/21 9:53:00 CDT, 83.9, kg, Weight Dosing, 10/28/21 9:53:00 CDT  The above findings, diagnostics, and treatment plan were discussed with Dr. Michel Peña who is in agreement with the plan of care.?   Problem List/Past Medical History  Ongoing  ARDS (adult respiratory distress syndrome)  Avascular necrosis of bone of left hip  Closed displaced fracture of left femoral neck  Closed displaced fracture of shaft of right clavicle  Depression with anxiety  Fracture of  transverse process of cervical vertebra  Fracture of transverse process of thoracic vertebra  Hand pain, right  Multiple rib fractures  Obesity  Pneumothorax  S/P percutaneous endoscopic gastrostomy (PEG) tube placement  Shingles  Status post tracheostomy  Tobacco user  Historical  Fever of unknown origin co-occurrent with human immunodeficiency virus infection  Pregnant  Pregnant  Procedure/Surgical History  Replacement of Left Hip Joint with Metal on Polyethylene Synthetic Substitute, Cemented, Open Approach (10/13/2021)  Total Hip Revision/Removal Robotic Assist (Left) (10/13/2021)  Arthrocentesis, aspiration and/or injection, major joint or bursa (eg, shoulder, hip, knee, subacromial bursa); without ultrasound guidance (2021)  Hip Aspiration (Left) (2021)  Introduction of Anesthetic Agent into Joints, Percutaneous Approach (2021)  Change Tracheostomy Device in Trachea, External Approach (2021)  Bypass Trachea to Cutaneous with Tracheostomy Device, Open Approach (2021)  Insertion of Feeding Device into Stomach, Percutaneous Approach (2021)  Insertion of Infusion Device into Superior Vena Cava, Percutaneous Approach (2021)  PEG Tube Insertion in Surgery (.) (2021)  Tracheostomy (.) (2021)  Insertion of Endotracheal Airway into Trachea, Via Natural or Artificial Opening (2021)  Respiratory Ventilation, Greater than 96 Consecutive Hours (2021)  ORIF Femur (2021)  Reposition Left Upper Femur with Internal Fixation Device, Open Approach (2021)  femur surgery (2020)   delivery only; (2018)   Section (None) (2018)  Extraction of Products of Conception, Low Cervical, Open Approach (2018)  ABD US  Biopsy of liver  Bronchoscope   delivery  Cholecystectomy  CT of abdomen  CT of head and sinuses  FESS - FSS sphenoidotomy  PEG  Rectilinear-scanning nuclear medicine system  rt knee repair  Tonsillectomy  and adenoidectomy  Tracheostomy   Medications  Alyacen 1/35 oral tablet, 1 tab(s), Oral, Daily  aspirin 81 mg oral Delayed Release (EC) tablet, 81 mg= 1 tab(s), Oral, BID  clonazePAM 0.5 mg oral tablet, 0.25 mg= 0.5 tab(s), Oral, BID,? ?Still taking, not as prescribed: as needed  gabapentin 300 mg oral capsule, 300 mg= 1 cap(s), Oral, TID  levothyroxine 88 mcg (0.088 mg) oral tablet, 88 mcg= 1 tab(s), Oral, qAM  Lexapro 10 mg oral tablet, 10 mg= 1 tab(s), Oral, Daily  Multi-Day Plus Minerals oral tablet, 1 tab(s), Oral, Daily  Neurontin 300 mg oral capsule, 300 mg= 1 cap(s), Oral, BID  Percocet 5/325 oral tablet, 1 tab(s), Oral, q6hr, PRN  Percocet 5/325 oral tablet, 1 tab(s), Oral, q4hr, PRN  polyethylene glycol 3350 oral powder for reconstitution, 17 gm, Oral, Daily  Restoril 15 mg oral capsule, 15 mg= 1 cap(s), Oral, At Bedtime, PRN  Robaxin 750 mg oral tablet, 1500 mg= 2 tab(s), Oral, TID  Robaxin 750 mg oral tablet, 750 mg= 1 tab(s), Oral, q6hr, PRN  Template Non-Formulary Oral LIQUID, 1 dropper, Oral, Daily  Vitamin D3, 1 tab, Oral, Daily  Allergies  tiZANidine?(Rash)  Social History  Abuse/Neglect  No, No, Yes, 10/13/2021  Alcohol  Current, Wine, 1-2 times per month, 09/23/2021  Employment/School  Employed, 10/06/2021  Home/Environment  Lives with Children, Spouse., 10/06/2021  Nutrition/Health  Regular, 10/06/2021  Spiritual/Cultural  Gnosticist, 10/06/2021  Substance Use  Never, 09/23/2021  Tobacco  Former smoker, quit more than 30 days ago, No, 10/13/2021  Family History  Diverticulitis of colon.: Father.  Hypothyroidism.: Mother.  Immunizations  Vaccine Date Status   COVID-19 MRNA, LNP-S, PF- Pfizer 02/01/2021 Given   COVID-19 MRNA, LNP-S, PF- Pfizer 01/11/2021 Given   tetanus/diphtheria/pertussis, acel(Tdap) 06/28/2018 Given   Health Maintenance  Health Maintenance  ???Pending?(in the next year)  ??? ??OverDue  ??? ? ? ?Smoking Cessation due??01/01/21??Variable frequency  ??? ? ? ?Alcohol Misuse Screening  due??01/02/21??and every 1??year(s)  ??? ??Due?  ??? ? ? ?Lipid Screening due??10/28/21??Unknown Frequency  ??? ??Due In Future?  ??? ? ? ?Obesity Screening not due until??01/01/22??and every 1??year(s)  ??? ? ? ?ADL Screening not due until??06/23/22??and every 1??year(s)  ??? ? ? ?Cervical Cancer Screening not due until??10/03/22??and every 3??year(s)  ???Satisfied?(in the past 1 year)  ??? ??Satisfied?  ??? ? ? ?ADL Screening on??06/23/21.??Satisfied by Mohini Hough.  ??? ? ? ?Blood Pressure Screening on??10/28/21.??Satisfied by Elvia Snowden LPNe JYOTHI.  ??? ? ? ?Body Mass Index Check on??10/28/21.??Satisfied by Sp GARCIA, Pushpa K.  ??? ? ? ?Depression Screening on??09/23/21.??Satisfied by Ana Rosa Soto  ??? ? ? ?Diabetes Screening on??10/15/21.??Satisfied by Silvino Orozco  ??? ? ? ?Influenza Vaccine on??10/13/21.??Satisfied by Ana Rosa Clement RN  ??? ? ? ?Lipid Screening on??03/19/21.??Satisfied by Sugey Aleman  ??? ? ? ?Obesity Screening on??10/28/21.??Satisfied by Sp GARCIA, Pushpa K.  ?  Diagnostic Results  AP &?lateral?left hip?reviewed. ?Patients implants well fixed with no signs of loosening or subsidence noted.

## 2022-05-04 DIAGNOSIS — Z96.649 CHRONIC HIP PAIN AFTER TOTAL REPLACEMENT OF HIP JOINT: Primary | ICD-10-CM

## 2022-05-04 DIAGNOSIS — G89.29 CHRONIC HIP PAIN AFTER TOTAL REPLACEMENT OF HIP JOINT: Primary | ICD-10-CM

## 2022-05-04 DIAGNOSIS — M25.559 CHRONIC HIP PAIN AFTER TOTAL REPLACEMENT OF HIP JOINT: Primary | ICD-10-CM

## 2022-05-05 RX ORDER — CLONAZEPAM 0.5 MG/1
0.5 TABLET ORAL 2 TIMES DAILY PRN
COMMUNITY
Start: 2021-05-17

## 2022-05-05 RX ORDER — LEVOTHYROXINE SODIUM 88 UG/1
1 TABLET ORAL EVERY MORNING
COMMUNITY
Start: 2021-07-13 | End: 2023-02-18

## 2022-05-05 RX ORDER — TRAMADOL HYDROCHLORIDE 50 MG/1
50 TABLET ORAL 2 TIMES DAILY
COMMUNITY
Start: 2022-04-29 | End: 2022-05-09

## 2022-05-05 RX ORDER — COLLAGENASE CLOSTRIDIUM HIST.
1 POWDER (EA) MISCELLANEOUS DAILY PRN
COMMUNITY
End: 2023-09-11

## 2022-05-05 RX ORDER — FLUTICASONE FUROATE AND VILANTEROL TRIFENATATE 100; 25 UG/1; UG/1
1 POWDER RESPIRATORY (INHALATION) DAILY PRN
COMMUNITY
Start: 2021-12-22 | End: 2023-09-11 | Stop reason: SDUPTHER

## 2022-05-05 RX ORDER — GABAPENTIN 300 MG/1
300 CAPSULE ORAL 2 TIMES DAILY
Status: ON HOLD | COMMUNITY
Start: 2021-10-28 | End: 2022-08-15 | Stop reason: CLARIF

## 2022-05-05 RX ORDER — VITAMIN B COMPLEX
1 CAPSULE ORAL DAILY
COMMUNITY
End: 2023-12-15

## 2022-05-05 RX ORDER — MELOXICAM 15 MG/1
15 TABLET ORAL DAILY
Status: ON HOLD | COMMUNITY
Start: 2021-11-30 | End: 2022-08-17 | Stop reason: HOSPADM

## 2022-05-05 RX ORDER — DEXAMETHASONE 4 MG/1
2 TABLET ORAL DAILY PRN
COMMUNITY
Start: 2021-11-29 | End: 2022-08-10

## 2022-05-06 ENCOUNTER — ANESTHESIA EVENT (OUTPATIENT)
Dept: SURGERY | Facility: HOSPITAL | Age: 42
End: 2022-05-06
Payer: COMMERCIAL

## 2022-05-09 ENCOUNTER — HOSPITAL ENCOUNTER (OUTPATIENT)
Facility: HOSPITAL | Age: 42
Discharge: HOME | End: 2022-05-09
Attending: ORTHOPAEDIC SURGERY | Admitting: ORTHOPAEDIC SURGERY
Payer: COMMERCIAL

## 2022-05-09 ENCOUNTER — ANESTHESIA (OUTPATIENT)
Dept: SURGERY | Facility: HOSPITAL | Age: 42
End: 2022-05-09
Payer: COMMERCIAL

## 2022-05-09 DIAGNOSIS — T84.019A: ICD-10-CM

## 2022-05-09 DIAGNOSIS — M25.559 CHRONIC HIP PAIN AFTER TOTAL REPLACEMENT OF HIP JOINT: Primary | ICD-10-CM

## 2022-05-09 DIAGNOSIS — Z96.649 CHRONIC HIP PAIN AFTER TOTAL REPLACEMENT OF HIP JOINT: Primary | ICD-10-CM

## 2022-05-09 DIAGNOSIS — G89.29 CHRONIC HIP PAIN AFTER TOTAL REPLACEMENT OF HIP JOINT: Primary | ICD-10-CM

## 2022-05-09 LAB
%MONO NUCL BF (OHS): 22 %
%POLYMORP BF(OHS): 78 %
B-HCG UR QL: NEGATIVE
CLARITY BODY FLUID (OHS): ABNORMAL
COLOR BODY FLUID (OHS): ABNORMAL
CTP QC/QA: YES
RBC COUNT BODY FLUID (OHS): ABNORMAL /MCL (ref 0–5)
WBC # FLD AUTO: 1421 /MCL (ref 0–10)

## 2022-05-09 PROCEDURE — 36000704 HC OR TIME LEV I 1ST 15 MIN: Performed by: ORTHOPAEDIC SURGERY

## 2022-05-09 PROCEDURE — 71000015 HC POSTOP RECOV 1ST HR: Performed by: ORTHOPAEDIC SURGERY

## 2022-05-09 PROCEDURE — 81025 URINE PREGNANCY TEST: CPT | Performed by: ORTHOPAEDIC SURGERY

## 2022-05-09 PROCEDURE — 87075 CULTR BACTERIA EXCEPT BLOOD: CPT | Performed by: ORTHOPAEDIC SURGERY

## 2022-05-09 PROCEDURE — 87070 CULTURE OTHR SPECIMN AEROBIC: CPT | Performed by: ORTHOPAEDIC SURGERY

## 2022-05-09 PROCEDURE — 37000009 HC ANESTHESIA EA ADD 15 MINS: Performed by: ORTHOPAEDIC SURGERY

## 2022-05-09 PROCEDURE — 27095 PR INJECTION HIP ARTHROGRAM,ANESTH: ICD-10-PCS | Mod: LT,,, | Performed by: ORTHOPAEDIC SURGERY

## 2022-05-09 PROCEDURE — 25000003 PHARM REV CODE 250: Performed by: ORTHOPAEDIC SURGERY

## 2022-05-09 PROCEDURE — 37000008 HC ANESTHESIA 1ST 15 MINUTES: Performed by: ORTHOPAEDIC SURGERY

## 2022-05-09 PROCEDURE — 63600175 PHARM REV CODE 636 W HCPCS: Performed by: NURSE ANESTHETIST, CERTIFIED REGISTERED

## 2022-05-09 PROCEDURE — 77002 PR FLUOROSCOPIC GUIDANCE NEEDLE PLACEMENT: ICD-10-PCS | Mod: 26,,, | Performed by: ORTHOPAEDIC SURGERY

## 2022-05-09 PROCEDURE — 36000705 HC OR TIME LEV I EA ADD 15 MIN: Performed by: ORTHOPAEDIC SURGERY

## 2022-05-09 PROCEDURE — 89051 BODY FLUID CELL COUNT: CPT | Performed by: ORTHOPAEDIC SURGERY

## 2022-05-09 PROCEDURE — 25000003 PHARM REV CODE 250: Performed by: NURSE ANESTHETIST, CERTIFIED REGISTERED

## 2022-05-09 PROCEDURE — 77002 NEEDLE LOCALIZATION BY XRAY: CPT | Mod: 26,,, | Performed by: ORTHOPAEDIC SURGERY

## 2022-05-09 PROCEDURE — 87205 SMEAR GRAM STAIN: CPT | Performed by: ORTHOPAEDIC SURGERY

## 2022-05-09 PROCEDURE — 27095 INJECTION FOR HIP X-RAY: CPT | Mod: LT,,, | Performed by: ORTHOPAEDIC SURGERY

## 2022-05-09 RX ORDER — LIDOCAINE HYDROCHLORIDE 10 MG/ML
INJECTION INFILTRATION; PERINEURAL
Status: COMPLETED
Start: 2022-05-09 | End: 2022-05-09

## 2022-05-09 RX ORDER — LIDOCAINE HYDROCHLORIDE 10 MG/ML
INJECTION INFILTRATION; PERINEURAL
Status: DISCONTINUED | OUTPATIENT
Start: 2022-05-09 | End: 2022-05-09 | Stop reason: HOSPADM

## 2022-05-09 RX ORDER — LIDOCAINE HYDROCHLORIDE 10 MG/ML
INJECTION, SOLUTION EPIDURAL; INFILTRATION; INTRACAUDAL; PERINEURAL
Status: DISCONTINUED | OUTPATIENT
Start: 2022-05-09 | End: 2022-05-09

## 2022-05-09 RX ORDER — MEPERIDINE HYDROCHLORIDE 25 MG/ML
12.5 INJECTION INTRAMUSCULAR; INTRAVENOUS; SUBCUTANEOUS ONCE AS NEEDED
Status: CANCELLED | OUTPATIENT
Start: 2022-05-09 | End: 2022-05-10

## 2022-05-09 RX ORDER — PROPOFOL 10 MG/ML
VIAL (ML) INTRAVENOUS CONTINUOUS PRN
Status: DISCONTINUED | OUTPATIENT
Start: 2022-05-09 | End: 2022-05-09

## 2022-05-09 RX ORDER — MIDAZOLAM HYDROCHLORIDE 1 MG/ML
2 INJECTION INTRAMUSCULAR; INTRAVENOUS ONCE AS NEEDED
Status: CANCELLED | OUTPATIENT
Start: 2022-05-09 | End: 2033-10-05

## 2022-05-09 RX ORDER — ONDANSETRON 2 MG/ML
4 INJECTION INTRAMUSCULAR; INTRAVENOUS DAILY PRN
Status: CANCELLED | OUTPATIENT
Start: 2022-05-09

## 2022-05-09 RX ORDER — HYDROMORPHONE HYDROCHLORIDE 2 MG/ML
0.2 INJECTION, SOLUTION INTRAMUSCULAR; INTRAVENOUS; SUBCUTANEOUS EVERY 5 MIN PRN
Status: CANCELLED | OUTPATIENT
Start: 2022-05-09

## 2022-05-09 RX ORDER — SODIUM CHLORIDE, SODIUM GLUCONATE, SODIUM ACETATE, POTASSIUM CHLORIDE AND MAGNESIUM CHLORIDE 30; 37; 368; 526; 502 MG/100ML; MG/100ML; MG/100ML; MG/100ML; MG/100ML
1000 INJECTION, SOLUTION INTRAVENOUS CONTINUOUS
Status: CANCELLED | OUTPATIENT
Start: 2022-05-09 | End: 2022-06-08

## 2022-05-09 RX ORDER — FENTANYL CITRATE 50 UG/ML
INJECTION, SOLUTION INTRAMUSCULAR; INTRAVENOUS
Status: DISCONTINUED | OUTPATIENT
Start: 2022-05-09 | End: 2022-05-09

## 2022-05-09 RX ADMIN — LIDOCAINE HYDROCHLORIDE 40 MG: 10 INJECTION, SOLUTION EPIDURAL; INFILTRATION; INTRACAUDAL; PERINEURAL at 07:05

## 2022-05-09 RX ADMIN — FENTANYL CITRATE 100 MCG: 50 INJECTION, SOLUTION INTRAMUSCULAR; INTRAVENOUS at 07:05

## 2022-05-09 RX ADMIN — PROPOFOL 50 MCG/KG/MIN: 10 INJECTION, EMULSION INTRAVENOUS at 07:05

## 2022-05-09 NOTE — OP NOTE
.Date of Procedure: 5/9/2022    Procedure: L hip joint aspiration/arthrogram    Provider: Michel Peña MD    Pre-Operative Diagnosis: painful total hip    Post-Operative Diagnosis: as above    Anesthesia: General/MAC    Description of the Findings of the Procedure:     The patient was brought to the procedure room.  IV access was obtained prior to the procedure.  The patient was positioned on the fluoroscopy table.  Sedation was administered by anesthesia.  The skin overlying the hip was prepped and draped in a sterile fashion.  The skin and subcutaneous tissue was anesthetized using 1-2 cc of lidocaine 2%.  An 18 gauge, spinal needle was slowly advanced through under fluoroscopic guidance into the acetabulofemoral joint. The needle position was confirmed using oblique, AP and lateral fluoroscopic imaging.  2 cc of Omnipaque 300 was injected confirming intra-articular contrast spread. Aspiration was performed at this point and 5 cc of serosanguineous colored fluid was able to be aspirated.  Fluid will be sent for cell count, cultures. The needle was removed and band-aid placed.  A sterile dressing was applied. Bernadette was taken to the Post-block Recovery Area for further observation. And discharged home when appropriate.    Complications: No    Estimated Blood Loss (EBL): Minimal           Specimens: none           Condition: Good    Disposition: PACU - hemodynamically stable.

## 2022-05-09 NOTE — INTERVAL H&P NOTE
The patient has been examined and the H&P has been reviewed:    I concur with the findings and no changes have occurred since H&P was written.    Surgery risks, benefits and alternative options discussed and understood by patient/family.          There are no hospital problems to display for this patient.     good hygiene

## 2022-05-09 NOTE — TRANSFER OF CARE
"Anesthesia Transfer of Care Note    Patient: Bernadette Lopez    Procedure(s) Performed: Procedure(s) (LRB):  ARTHROCENTESIS, HIP aspiration under fluroscopy (Left)    Patient location: OPS    Anesthesia Type: spinal and general    Transport from OR: Transported from OR on room air with adequate spontaneous ventilation    Post pain: adequate analgesia    Post assessment: no apparent anesthetic complications    Post vital signs: stable    Level of consciousness: awake and alert    Nausea/Vomiting: no nausea/vomiting    Complications: none    Transfer of care protocol was followed      Last vitals:   Visit Vitals  /65 (BP Location: Right arm, Patient Position: Lying)   Pulse 81   Temp 35.8 °C (96.4 °F) (Tympanic)   Resp 20   Ht 5' 1" (1.549 m)   Wt 95.3 kg (210 lb)   LMP 05/02/2022 (Exact Date)   SpO2 97%   Breastfeeding No   BMI 39.68 kg/m²     "

## 2022-05-09 NOTE — ANESTHESIA PREPROCEDURE EVALUATION
05/09/2022  Bernadette Lopez is a 41 y.o., female.  ARTHROCENTESIS, HIP aspiration under fluroscopy (Left Hip)      Pre-op Assessment          Review of Systems  Anesthesia Hx:  No problems with previous Anesthesia  Denies Family Hx of Anesthesia complications.    Cardiovascular:  Cardiovascular Normal  No Cardiac Complaints   Pulmonary:  Pulmonary Normal No Pulmonary Complaints   Hepatic/GI:   No Current GERD Sx       Physical Exam  General: Alert and Oriented    Airway:  Mallampati: II   Mouth Opening: Normal  TM Distance: Normal  Tongue: Normal  Neck ROM: Normal ROM    Dental:  Intact    Chest/Lungs:  Clear to auscultation, Normal Respiratory Rate    Heart:  Rate: Normal  Rhythm: Regular Rhythm        Anesthesia Plan  Type of Anesthesia, risks & benefits discussed:    Anesthesia Type: MAC  Intra-op Monitoring Plan: Standard ASA Monitors  Post Op Pain Control Plan: multimodal analgesia  Induction:  IV  Airway Plan: Direct  Informed Consent: Informed consent signed with the Patient and all parties understand the risks and agree with anesthesia plan.  All questions answered. Patient consented to blood products? Yes  ASA Score: 2  Day of Surgery Review of History & Physical: H&P Update referred to the surgeon/provider.  Anesthesia Plan Notes: Disc possible conversion to LMA/GETA. Questions entertained. Accepted  Supplemental O2 by NC/NRB/[employer]    Ready For Surgery From Anesthesia Perspective.     .

## 2022-05-09 NOTE — ANESTHESIA POSTPROCEDURE EVALUATION
Anesthesia Post Evaluation    Patient: Bernadette Lopez    Procedure(s) Performed: Procedure(s) (LRB):  ARTHROCENTESIS, HIP aspiration under fluroscopy (Left)    Final Anesthesia Type: general      Patient location during evaluation: floor  Patient participation: Yes- Able to Participate  Level of consciousness: awake  Post-procedure vital signs: reviewed and stable  Pain management: adequate  Airway patency: patent    PONV status at discharge: vomiting (controlled) and nausea (inadequately controlled)  Anesthetic complications: no      Cardiovascular status: hemodynamically stable  Respiratory status: spontaneous ventilation and unassisted  Hydration status: euvolemic  Follow-up not needed.          Vitals Value Taken Time   BP 90/56 05/09/22 0758   Temp 37 05/09/22 0800   Pulse 77 05/09/22 0759   Resp 18 05/09/22 0800   SpO2 92 % 05/09/22 0759   Vitals shown include unvalidated device data.      No case tracking events are documented in the log.      Pain/Dena Score: No data recorded

## 2022-05-10 VITALS
DIASTOLIC BLOOD PRESSURE: 56 MMHG | OXYGEN SATURATION: 96 % | HEIGHT: 61 IN | BODY MASS INDEX: 39.65 KG/M2 | RESPIRATION RATE: 16 BRPM | HEART RATE: 79 BPM | SYSTOLIC BLOOD PRESSURE: 90 MMHG | WEIGHT: 210 LBS | TEMPERATURE: 96 F

## 2022-05-10 LAB
GRAM STN SPEC: NORMAL
GRAM STN SPEC: NORMAL

## 2022-05-12 ENCOUNTER — OFFICE VISIT (OUTPATIENT)
Dept: ORTHOPEDICS | Facility: CLINIC | Age: 42
End: 2022-05-12
Payer: COMMERCIAL

## 2022-05-12 VITALS
BODY MASS INDEX: 39.65 KG/M2 | DIASTOLIC BLOOD PRESSURE: 78 MMHG | WEIGHT: 210 LBS | SYSTOLIC BLOOD PRESSURE: 107 MMHG | HEIGHT: 61 IN | HEART RATE: 96 BPM

## 2022-05-12 DIAGNOSIS — M25.552 CHRONIC HIP PAIN AFTER TOTAL REPLACEMENT OF LEFT HIP JOINT: Primary | ICD-10-CM

## 2022-05-12 DIAGNOSIS — Z96.642 CHRONIC HIP PAIN AFTER TOTAL REPLACEMENT OF LEFT HIP JOINT: Primary | ICD-10-CM

## 2022-05-12 DIAGNOSIS — Z96.642 PRESENCE OF LEFT ARTIFICIAL HIP JOINT: ICD-10-CM

## 2022-05-12 DIAGNOSIS — G89.29 CHRONIC HIP PAIN AFTER TOTAL REPLACEMENT OF LEFT HIP JOINT: Primary | ICD-10-CM

## 2022-05-12 PROCEDURE — 3078F PR MOST RECENT DIASTOLIC BLOOD PRESSURE < 80 MM HG: ICD-10-PCS | Mod: CPTII,,, | Performed by: ORTHOPAEDIC SURGERY

## 2022-05-12 PROCEDURE — 3074F SYST BP LT 130 MM HG: CPT | Mod: CPTII,,, | Performed by: ORTHOPAEDIC SURGERY

## 2022-05-12 PROCEDURE — 3008F BODY MASS INDEX DOCD: CPT | Mod: CPTII,,, | Performed by: ORTHOPAEDIC SURGERY

## 2022-05-12 PROCEDURE — 3078F DIAST BP <80 MM HG: CPT | Mod: CPTII,,, | Performed by: ORTHOPAEDIC SURGERY

## 2022-05-12 PROCEDURE — 1160F RVW MEDS BY RX/DR IN RCRD: CPT | Mod: CPTII,,, | Performed by: ORTHOPAEDIC SURGERY

## 2022-05-12 PROCEDURE — 99213 OFFICE O/P EST LOW 20 MIN: CPT | Mod: ,,, | Performed by: ORTHOPAEDIC SURGERY

## 2022-05-12 PROCEDURE — 3008F PR BODY MASS INDEX (BMI) DOCUMENTED: ICD-10-PCS | Mod: CPTII,,, | Performed by: ORTHOPAEDIC SURGERY

## 2022-05-12 PROCEDURE — 1159F MED LIST DOCD IN RCRD: CPT | Mod: CPTII,,, | Performed by: ORTHOPAEDIC SURGERY

## 2022-05-12 PROCEDURE — 99213 PR OFFICE/OUTPT VISIT, EST, LEVL III, 20-29 MIN: ICD-10-PCS | Mod: ,,, | Performed by: ORTHOPAEDIC SURGERY

## 2022-05-12 PROCEDURE — 1160F PR REVIEW ALL MEDS BY PRESCRIBER/CLIN PHARMACIST DOCUMENTED: ICD-10-PCS | Mod: CPTII,,, | Performed by: ORTHOPAEDIC SURGERY

## 2022-05-12 PROCEDURE — 1159F PR MEDICATION LIST DOCUMENTED IN MEDICAL RECORD: ICD-10-PCS | Mod: CPTII,,, | Performed by: ORTHOPAEDIC SURGERY

## 2022-05-12 PROCEDURE — 3074F PR MOST RECENT SYSTOLIC BLOOD PRESSURE < 130 MM HG: ICD-10-PCS | Mod: CPTII,,, | Performed by: ORTHOPAEDIC SURGERY

## 2022-05-12 RX ORDER — TRAMADOL HYDROCHLORIDE 50 MG/1
50 TABLET ORAL EVERY 6 HOURS
Qty: 28 TABLET | Refills: 0 | Status: ON HOLD | OUTPATIENT
Start: 2022-05-12 | End: 2022-08-17 | Stop reason: HOSPADM

## 2022-05-12 RX ORDER — MELOXICAM 15 MG/1
15 TABLET ORAL DAILY
Qty: 30 TABLET | Refills: 2 | Status: ON HOLD | OUTPATIENT
Start: 2022-05-12 | End: 2022-08-17 | Stop reason: HOSPADM

## 2022-05-12 NOTE — PROGRESS NOTES
Past Medical History:   Diagnosis Date    ARDS (adult respiratory distress syndrome)     Arthritis     Thyroid disease        Past Surgical History:   Procedure Laterality Date    ARTHROCENTESIS OF HIP JOINT Left 2022    Procedure: ARTHROCENTESIS, HIP aspiration under fluroscopy;  Surgeon: Michel Peña MD;  Location: [facility];  Service: Orthopedics;  Laterality: Left;     SECTION      x 2    INSERTION OF PERCUTANEOUS ENDOSCOPIC GASTROSTOMY (PEG) FEEDING TUBE      KNEE ARTHROSCOPY Right     LAPAROSCOPIC CHOLECYSTECTOMY      ORIF HIP FRACTURE Left     X2    PEG TUBE REMOVAL      REMOVAL OF TRACHEOSTOMY TUBE      SINUS SURGERY      x2    TOTAL HIP ARTHROPLASTY Left     TRACHEOTOMY         Current Outpatient Medications   Medication Sig    ALBUTEROL INHL Inhale 1 puff into the lungs daily as needed.    b complex vitamins capsule Take 1 capsule by mouth once daily.    clonazePAM (KLONOPIN) 0.5 MG tablet Take 0.5 mg by mouth 2 (two) times daily as needed.    collagenase Clostridium hist. (COLLAGENASE CLOS HIST., BULK,) Powd 1 Scoop by Other route daily as needed.    fluticasone furoate-vilanteroL (BREO ELLIPTA) 100-25 mcg/dose diskus inhaler Inhale 1 puff into the lungs daily as needed.    fluticasone propionate (FLOVENT HFA) 110 mcg/actuation inhaler Inhale 2 puffs into the lungs daily as needed.    gabapentin (NEURONTIN) 300 MG capsule Take 300 mg by mouth 2 (two) times daily.    levothyroxine (SYNTHROID) 88 MCG tablet Take 1 tablet by mouth every morning.    meloxicam (MOBIC) 15 MG tablet Take 15 mg by mouth once daily at 6am.    multivitamin with minerals tablet Take 1 tablet by mouth once daily at 6am.     No current facility-administered medications for this visit.       Review of patient's allergies indicates:   Allergen Reactions    Tizanidine Rash       History reviewed. No pertinent family history.    Social History     Socioeconomic History    Marital status: Single    Tobacco Use    Smoking status: Former Smoker     Types: Cigarettes    Smokeless tobacco: Never Used   Substance and Sexual Activity    Alcohol use: Yes     Alcohol/week: 1.0 standard drink     Types: 1 Glasses of wine per week    Drug use: Never    Sexual activity: Yes       Chief Complaint:   Chief Complaint   Patient presents with    Left Hip - Follow-up    Follow-up     Left Hip F/U INJECTION 10/13/21 PT STATES SHE IS DOING BETTER THAN BEFORE, MILD PAIN THAT IS TOLERABLE, AMBULATING WITH CRUTCHES       History of present illness:  41-year-old female presents status post aspiration of left hip to evaluate for infection.  Patient actually states she feels better since the aspiration.  Her hip pain is not as severe as it once was.      Review of Systems:    Constitution: Negative for chills, fever, and sweats.  Negative for unexplained weight loss.    HENT:  Negative for headaches and blurry vision.    Cardiovascular:Negative for chest pain or irregular heart beat. Negative for hypertension.    Respiratory:  Negative for cough and shortness of breath.    Gastrointestinal: Negative for abdominal pain, heartburn, melena, nausea, and vomitting.    Genitourinary:  Negative bladder incontinence and dysuria.    Musculoskeletal:  See HPI    Neurological: Negative for numbness.    Psychiatric/Behavioral: Negative for depression.  The patient is not nervous/anxious.      Endocrine: Negative for polyuria    Hematologic/Lymphatic: Negative for bleeding problem.  Does not bruise/bleed easily.    Skin: Negative for poor would healing and rash      Physical Examination:    Vital Signs:    Vitals:    05/12/22 1437   BP: 107/78   Pulse: 96       Body mass index is 39.68 kg/m².    General: No acute distress, alert and oriented, healthy appearing    HEENT: Head is atraumatic, mucous membranes are moist    Neck: Supples, no JVD    Cardiovascular: Palpable dorsalis pedis and posterior tibial pulses, regular rate and rhythm to  those pulses    Lungs: Breathing non-labored    Skin: no rashes appreciated    Neurologic: Can flex and extend knees, ankles, and toes. Sensation is grossly intact    Left hip:  Range of motion of the hip with mild pain.  Patient has a positive Stinchfield.  Incision well healed.    X-rays:  None  Cultures negative.  Cell count within normal limits     Assessment::  Status post aspiration of left hip with ongoing chronic left hip pain after arthroplasty    Plan:  Plan for [facility] MRI to evaluate for loosening or other soft tissue abnormalities around her left hip.  We will see her back after this MRI has been performed to evaluate for the possible treatment options.    This note was created using [employer] voice recognition software that occasionally misinterpreted phrases or words.    Consult note is delivered via Epic messaging service.

## 2022-05-13 LAB — BACTERIA SPEC ANAEROBE CULT: NORMAL

## 2022-05-14 LAB — BACTERIA FLD CULT: NORMAL

## 2022-05-24 ENCOUNTER — OFFICE VISIT (OUTPATIENT)
Dept: ORTHOPEDICS | Facility: CLINIC | Age: 42
End: 2022-05-24
Payer: COMMERCIAL

## 2022-05-24 VITALS
BODY MASS INDEX: 39.65 KG/M2 | SYSTOLIC BLOOD PRESSURE: 97 MMHG | DIASTOLIC BLOOD PRESSURE: 65 MMHG | WEIGHT: 210 LBS | HEART RATE: 88 BPM | HEIGHT: 61 IN

## 2022-05-24 DIAGNOSIS — M25.559 PAIN IN UNSPECIFIED HIP: ICD-10-CM

## 2022-05-24 DIAGNOSIS — Z96.649 FAILED TOTAL HIP ARTHROPLASTY, INITIAL ENCOUNTER: Primary | ICD-10-CM

## 2022-05-24 DIAGNOSIS — T84.018A FAILED TOTAL HIP ARTHROPLASTY, INITIAL ENCOUNTER: Primary | ICD-10-CM

## 2022-05-24 PROCEDURE — 3008F PR BODY MASS INDEX (BMI) DOCUMENTED: ICD-10-PCS | Mod: CPTII,,, | Performed by: ORTHOPAEDIC SURGERY

## 2022-05-24 PROCEDURE — 99213 OFFICE O/P EST LOW 20 MIN: CPT | Mod: ,,, | Performed by: ORTHOPAEDIC SURGERY

## 2022-05-24 PROCEDURE — 1159F MED LIST DOCD IN RCRD: CPT | Mod: CPTII,,, | Performed by: ORTHOPAEDIC SURGERY

## 2022-05-24 PROCEDURE — 3078F DIAST BP <80 MM HG: CPT | Mod: CPTII,,, | Performed by: ORTHOPAEDIC SURGERY

## 2022-05-24 PROCEDURE — 99213 PR OFFICE/OUTPT VISIT, EST, LEVL III, 20-29 MIN: ICD-10-PCS | Mod: ,,, | Performed by: ORTHOPAEDIC SURGERY

## 2022-05-24 PROCEDURE — 1160F PR REVIEW ALL MEDS BY PRESCRIBER/CLIN PHARMACIST DOCUMENTED: ICD-10-PCS | Mod: CPTII,,, | Performed by: ORTHOPAEDIC SURGERY

## 2022-05-24 PROCEDURE — 1160F RVW MEDS BY RX/DR IN RCRD: CPT | Mod: CPTII,,, | Performed by: ORTHOPAEDIC SURGERY

## 2022-05-24 PROCEDURE — 3008F BODY MASS INDEX DOCD: CPT | Mod: CPTII,,, | Performed by: ORTHOPAEDIC SURGERY

## 2022-05-24 PROCEDURE — 1159F PR MEDICATION LIST DOCUMENTED IN MEDICAL RECORD: ICD-10-PCS | Mod: CPTII,,, | Performed by: ORTHOPAEDIC SURGERY

## 2022-05-24 PROCEDURE — 3078F PR MOST RECENT DIASTOLIC BLOOD PRESSURE < 80 MM HG: ICD-10-PCS | Mod: CPTII,,, | Performed by: ORTHOPAEDIC SURGERY

## 2022-05-24 PROCEDURE — 3074F PR MOST RECENT SYSTOLIC BLOOD PRESSURE < 130 MM HG: ICD-10-PCS | Mod: CPTII,,, | Performed by: ORTHOPAEDIC SURGERY

## 2022-05-24 PROCEDURE — 3074F SYST BP LT 130 MM HG: CPT | Mod: CPTII,,, | Performed by: ORTHOPAEDIC SURGERY

## 2022-05-24 RX ORDER — NITROFURANTOIN 25; 75 MG/1; MG/1
100 CAPSULE ORAL 2 TIMES DAILY
COMMUNITY
Start: 2022-05-20 | End: 2023-09-11

## 2022-05-24 RX ORDER — ASCORBIC ACID 500 MG
500 TABLET ORAL
COMMUNITY
End: 2023-12-15

## 2022-05-24 RX ORDER — CALCIUM CARBONATE 600 MG
TABLET ORAL
COMMUNITY
Start: 2022-04-22

## 2022-05-24 RX ORDER — ALBUTEROL SULFATE 0.83 MG/ML
1 SOLUTION RESPIRATORY (INHALATION) DAILY PRN
COMMUNITY
Start: 2021-12-22

## 2022-05-24 NOTE — PROGRESS NOTES
Past Medical History:   Diagnosis Date    ARDS (adult respiratory distress syndrome)     Arthritis     Thyroid disease        Past Surgical History:   Procedure Laterality Date    ARTHROCENTESIS OF HIP JOINT Left 2022    Procedure: ARTHROCENTESIS, HIP aspiration under fluroscopy;  Surgeon: Michel Peña MD;  Location: [facility];  Service: Orthopedics;  Laterality: Left;     SECTION      x 2    INSERTION OF PERCUTANEOUS ENDOSCOPIC GASTROSTOMY (PEG) FEEDING TUBE      KNEE ARTHROSCOPY Right     LAPAROSCOPIC CHOLECYSTECTOMY      ORIF HIP FRACTURE Left     X2    PEG TUBE REMOVAL      REMOVAL OF TRACHEOSTOMY TUBE      SINUS SURGERY      x2    TOTAL HIP ARTHROPLASTY Left     TRACHEOTOMY         Current Outpatient Medications   Medication Sig    albuterol (PROVENTIL) 2.5 mg /3 mL (0.083 %) nebulizer solution Inhale 1 mL into the lungs daily as needed.    ALBUTEROL INHL Inhale 1 puff into the lungs daily as needed.    ascorbic acid, vitamin C, (VITAMIN C) 500 MG tablet Take 500 mg by mouth.    b complex vitamins capsule Take 1 capsule by mouth once daily.    calcium carbonate (OS-LOGAN) 600 mg calcium (1,500 mg) Tab     clonazePAM (KLONOPIN) 0.5 MG tablet Take 0.5 mg by mouth 2 (two) times daily as needed.    collagenase Clostridium hist. (COLLAGENASE CLOS HIST., BULK,) Powd 1 Scoop by Other route daily as needed.    fluticasone furoate-vilanteroL (BREO ELLIPTA) 100-25 mcg/dose diskus inhaler Inhale 1 puff into the lungs daily as needed.    fluticasone propionate (FLOVENT HFA) 110 mcg/actuation inhaler Inhale 2 puffs into the lungs daily as needed.    gabapentin (NEURONTIN) 300 MG capsule Take 300 mg by mouth 2 (two) times daily.    levothyroxine (SYNTHROID) 88 MCG tablet Take 1 tablet by mouth every morning.    meloxicam (MOBIC) 15 MG tablet Take 15 mg by mouth once daily at 6am.    meloxicam (MOBIC) 15 MG tablet Take 1 tablet (15 mg total) by mouth once daily.    multivitamin with  minerals tablet Take 1 tablet by mouth once daily at 6am.    nitrofurantoin, macrocrystal-monohydrate, (MACROBID) 100 MG capsule Take 100 mg by mouth 2 (two) times daily.    traMADoL (ULTRAM) 50 mg tablet Take 1 tablet (50 mg total) by mouth every 6 (six) hours.     No current facility-administered medications for this visit.       Review of patient's allergies indicates:   Allergen Reactions    Tizanidine Rash       Family History   Family history unknown: Yes       Social History     Socioeconomic History    Marital status: Single   Tobacco Use    Smoking status: Former Smoker     Types: Cigarettes    Smokeless tobacco: Never Used   Substance and Sexual Activity    Alcohol use: Yes     Alcohol/week: 1.0 standard drink     Types: 1 Glasses of wine per week    Drug use: Never    Sexual activity: Yes       Chief Complaint:   Chief Complaint   Patient presents with    Results     MRI left hip results, pt states pain is getting worse, pt is taking tramdol, gabapetin, and mobic,        History of present illness:  41-year-old female presents today for follow-up of MRI of her left hip.  She continues to have significant complaints of pain to the left hip.  It is worse with activity.  She is now on crutches and continues to use crutches.      Review of Systems:    Constitution: Negative for chills, fever, and sweats.  Negative for unexplained weight loss.    HENT:  Negative for headaches and blurry vision.    Cardiovascular:Negative for chest pain or irregular heart beat. Negative for hypertension.    Respiratory:  Negative for cough and shortness of breath.    Gastrointestinal: Negative for abdominal pain, heartburn, melena, nausea, and vomitting.    Genitourinary:  Negative bladder incontinence and dysuria.    Musculoskeletal:  See HPI    Neurological: Negative for numbness.    Psychiatric/Behavioral: Negative for depression.  The patient is not nervous/anxious.      Endocrine: Negative for  polyuria    Hematologic/Lymphatic: Negative for bleeding problem.  Does not bruise/bleed easily.    Skin: Negative for poor would healing and rash      Physical Examination:    Vital Signs:    Vitals:    05/24/22 1609   BP: 97/65   Pulse: 88       Body mass index is 39.68 kg/m².    General: No acute distress, alert and oriented, healthy appearing    HEENT: Head is atraumatic, mucous membranes are moist    Neck: Supples, no JVD    Cardiovascular: Palpable dorsalis pedis and posterior tibial pulses, regular rate and rhythm to those pulses    Lungs: Breathing non-labored    Skin: no rashes appreciated    Neurologic: Can flex and extend knees, ankles, and toes. Sensation is grossly intact    Left hip:  Range of motion left hip with pain.  She walks an antalgic gait using crutches.    X-rays:  MRI reviewed.  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  Given her young age and likelihood of needing needing revision style components, will plan to get a bone scan to confirm suspicion of early loosening.  The patient is confirmed on the bone scan, will likely need revision of her left hip.    This note was created using [employer] voice recognition software that occasionally misinterpreted phrases or words.    Consult note is delivered via Epic messaging service.

## 2022-05-25 RX ORDER — MELOXICAM 15 MG/1
15 TABLET ORAL DAILY
Qty: 30 TABLET | Refills: 1 | Status: ON HOLD | OUTPATIENT
Start: 2022-05-25 | End: 2022-08-17 | Stop reason: HOSPADM

## 2022-05-25 RX ORDER — GABAPENTIN 300 MG/1
300 CAPSULE ORAL 2 TIMES DAILY
Qty: 28 CAPSULE | Refills: 0 | Status: SHIPPED | OUTPATIENT
Start: 2022-05-25 | End: 2022-06-08

## 2022-05-25 RX ORDER — TRAMADOL HYDROCHLORIDE 50 MG/1
50 TABLET ORAL EVERY 6 HOURS PRN
Qty: 28 TABLET | Refills: 0 | Status: SHIPPED | OUTPATIENT
Start: 2022-05-25 | End: 2022-06-01

## 2022-06-10 ENCOUNTER — HOSPITAL ENCOUNTER (OUTPATIENT)
Dept: RADIOLOGY | Facility: HOSPITAL | Age: 42
Discharge: HOME | End: 2022-06-10
Attending: ORTHOPAEDIC SURGERY
Payer: COMMERCIAL

## 2022-06-10 DIAGNOSIS — M25.559 PAIN IN UNSPECIFIED HIP: ICD-10-CM

## 2022-06-10 PROCEDURE — 78306 BONE IMAGING WHOLE BODY: CPT | Mod: TC

## 2022-06-10 PROCEDURE — A9503 TC99M MEDRONATE: HCPCS

## 2022-06-14 ENCOUNTER — OFFICE VISIT (OUTPATIENT)
Dept: ORTHOPEDICS | Facility: CLINIC | Age: 42
End: 2022-06-14
Payer: COMMERCIAL

## 2022-06-14 VITALS
BODY MASS INDEX: 39.65 KG/M2 | HEIGHT: 61 IN | HEART RATE: 82 BPM | WEIGHT: 210 LBS | RESPIRATION RATE: 20 BRPM | DIASTOLIC BLOOD PRESSURE: 75 MMHG | SYSTOLIC BLOOD PRESSURE: 116 MMHG

## 2022-06-14 DIAGNOSIS — Z96.649 FAILED TOTAL HIP ARTHROPLASTY, INITIAL ENCOUNTER: Primary | ICD-10-CM

## 2022-06-14 DIAGNOSIS — M70.62 GREATER TROCHANTERIC BURSITIS OF LEFT HIP: ICD-10-CM

## 2022-06-14 DIAGNOSIS — T84.018A FAILED TOTAL HIP ARTHROPLASTY, INITIAL ENCOUNTER: Primary | ICD-10-CM

## 2022-06-14 PROCEDURE — 1159F PR MEDICATION LIST DOCUMENTED IN MEDICAL RECORD: ICD-10-PCS | Mod: CPTII,,, | Performed by: ORTHOPAEDIC SURGERY

## 2022-06-14 PROCEDURE — 3074F SYST BP LT 130 MM HG: CPT | Mod: CPTII,,, | Performed by: ORTHOPAEDIC SURGERY

## 2022-06-14 PROCEDURE — 3008F PR BODY MASS INDEX (BMI) DOCUMENTED: ICD-10-PCS | Mod: CPTII,,, | Performed by: ORTHOPAEDIC SURGERY

## 2022-06-14 PROCEDURE — 3078F DIAST BP <80 MM HG: CPT | Mod: CPTII,,, | Performed by: ORTHOPAEDIC SURGERY

## 2022-06-14 PROCEDURE — 3008F BODY MASS INDEX DOCD: CPT | Mod: CPTII,,, | Performed by: ORTHOPAEDIC SURGERY

## 2022-06-14 PROCEDURE — 1159F MED LIST DOCD IN RCRD: CPT | Mod: CPTII,,, | Performed by: ORTHOPAEDIC SURGERY

## 2022-06-14 PROCEDURE — 99213 OFFICE O/P EST LOW 20 MIN: CPT | Mod: 25,,, | Performed by: ORTHOPAEDIC SURGERY

## 2022-06-14 PROCEDURE — 3074F PR MOST RECENT SYSTOLIC BLOOD PRESSURE < 130 MM HG: ICD-10-PCS | Mod: CPTII,,, | Performed by: ORTHOPAEDIC SURGERY

## 2022-06-14 PROCEDURE — 3078F PR MOST RECENT DIASTOLIC BLOOD PRESSURE < 80 MM HG: ICD-10-PCS | Mod: CPTII,,, | Performed by: ORTHOPAEDIC SURGERY

## 2022-06-14 PROCEDURE — 20610 LARGE JOINT ASPIRATION/INJECTION: L GREATER TROCHANTERIC BURSA: ICD-10-PCS | Mod: LT,,, | Performed by: ORTHOPAEDIC SURGERY

## 2022-06-14 PROCEDURE — 20610 DRAIN/INJ JOINT/BURSA W/O US: CPT | Mod: LT,,, | Performed by: ORTHOPAEDIC SURGERY

## 2022-06-14 PROCEDURE — 99213 PR OFFICE/OUTPT VISIT, EST, LEVL III, 20-29 MIN: ICD-10-PCS | Mod: 25,,, | Performed by: ORTHOPAEDIC SURGERY

## 2022-06-14 RX ORDER — BETAMETHASONE SODIUM PHOSPHATE AND BETAMETHASONE ACETATE 3; 3 MG/ML; MG/ML
12 INJECTION, SUSPENSION INTRA-ARTICULAR; INTRALESIONAL; INTRAMUSCULAR; SOFT TISSUE
Status: DISCONTINUED | OUTPATIENT
Start: 2022-06-14 | End: 2022-06-14 | Stop reason: HOSPADM

## 2022-06-14 RX ORDER — MELOXICAM 15 MG/1
15 TABLET ORAL DAILY
Qty: 30 TABLET | Refills: 2 | Status: SHIPPED | OUTPATIENT
Start: 2022-06-14 | End: 2022-07-13 | Stop reason: SDUPTHER

## 2022-06-14 RX ORDER — TRAMADOL HYDROCHLORIDE 50 MG/1
50 TABLET ORAL EVERY 6 HOURS PRN
Qty: 28 TABLET | Refills: 0 | Status: SHIPPED | OUTPATIENT
Start: 2022-06-14 | End: 2022-06-21

## 2022-06-14 RX ORDER — LIDOCAINE HYDROCHLORIDE 20 MG/ML
5 INJECTION, SOLUTION EPIDURAL; INFILTRATION; INTRACAUDAL; PERINEURAL
Status: DISCONTINUED | OUTPATIENT
Start: 2022-06-14 | End: 2022-06-14 | Stop reason: HOSPADM

## 2022-06-14 RX ORDER — GABAPENTIN 300 MG/1
300 CAPSULE ORAL 2 TIMES DAILY
Qty: 60 CAPSULE | Refills: 0 | Status: SHIPPED | OUTPATIENT
Start: 2022-06-14 | End: 2023-09-11

## 2022-06-14 RX ADMIN — LIDOCAINE HYDROCHLORIDE 5 ML: 20 INJECTION, SOLUTION EPIDURAL; INFILTRATION; INTRACAUDAL; PERINEURAL at 09:06

## 2022-06-14 RX ADMIN — BETAMETHASONE SODIUM PHOSPHATE AND BETAMETHASONE ACETATE 12 MG: 3; 3 INJECTION, SUSPENSION INTRA-ARTICULAR; INTRALESIONAL; INTRAMUSCULAR; SOFT TISSUE at 09:06

## 2022-06-14 NOTE — PROCEDURES
"Bernadette Lopez is a 41 y.o. female patient.    Pulse: 82 (06/14/22 0950)  Resp: 20 (06/14/22 0950)  BP: 116/75 (06/14/22 0950)  Weight: 95.3 kg (210 lb) (06/14/22 0950)  Height: 5' 1" (154.9 cm) (06/14/22 0950)       Large Joint Aspiration/Injection: L greater trochanteric bursa    Date/Time: 6/14/2022 9:45 AM  Performed by: DIXON Epperson  Authorized by: Michel Peña MD     Consent Done?:  Yes (Verbal)  Indications:  Pain  Timeout: prior to procedure the correct patient, procedure, and site was verified    Prep: patient was prepped and draped in usual sterile fashion      Details:  Needle Size:  22 G  Ultrasonic Guidance for needle placement?: No    Approach:  Lateral  Location:  Hip  Site:  L greater trochanteric bursa  Medications:  5 mL LIDOcaine (PF) 20 mg/mL (2%) 20 mg/mL (2 %); 12 mg betamethasone acetate-betamethasone sodium phosphate 6 mg/mL  Patient tolerance:  Patient tolerated the procedure well with no immediate complications        6/14/2022    "

## 2022-07-13 ENCOUNTER — DOCUMENTATION ONLY (OUTPATIENT)
Dept: ORTHOPEDICS | Facility: CLINIC | Age: 42
End: 2022-07-13
Payer: COMMERCIAL

## 2022-07-13 DIAGNOSIS — T84.018A FAILED TOTAL HIP ARTHROPLASTY, INITIAL ENCOUNTER: Primary | ICD-10-CM

## 2022-07-13 DIAGNOSIS — Z96.649 FAILED TOTAL HIP ARTHROPLASTY, INITIAL ENCOUNTER: Primary | ICD-10-CM

## 2022-07-13 RX ORDER — TRAMADOL HYDROCHLORIDE 50 MG/1
50 TABLET ORAL EVERY 6 HOURS
Qty: 28 TABLET | Refills: 0 | OUTPATIENT
Start: 2022-07-13

## 2022-07-13 RX ORDER — TRAMADOL HYDROCHLORIDE 50 MG/1
50 TABLET ORAL EVERY 6 HOURS PRN
Qty: 28 TABLET | Refills: 0 | Status: SHIPPED | OUTPATIENT
Start: 2022-07-13 | End: 2022-07-20

## 2022-07-26 ENCOUNTER — PATIENT MESSAGE (OUTPATIENT)
Dept: ORTHOPEDICS | Facility: CLINIC | Age: 42
End: 2022-07-26

## 2022-07-26 ENCOUNTER — OFFICE VISIT (OUTPATIENT)
Dept: ORTHOPEDICS | Facility: CLINIC | Age: 42
End: 2022-07-26
Payer: COMMERCIAL

## 2022-07-26 VITALS — WEIGHT: 210 LBS | HEIGHT: 61 IN | BODY MASS INDEX: 39.65 KG/M2

## 2022-07-26 DIAGNOSIS — Z96.649 FAILED TOTAL HIP ARTHROPLASTY, INITIAL ENCOUNTER: Primary | ICD-10-CM

## 2022-07-26 DIAGNOSIS — T84.018A FAILED TOTAL HIP ARTHROPLASTY, INITIAL ENCOUNTER: Primary | ICD-10-CM

## 2022-07-26 PROCEDURE — 3008F PR BODY MASS INDEX (BMI) DOCUMENTED: ICD-10-PCS | Mod: CPTII,,, | Performed by: ORTHOPAEDIC SURGERY

## 2022-07-26 PROCEDURE — 99214 OFFICE O/P EST MOD 30 MIN: CPT | Mod: ,,, | Performed by: ORTHOPAEDIC SURGERY

## 2022-07-26 PROCEDURE — 3008F BODY MASS INDEX DOCD: CPT | Mod: CPTII,,, | Performed by: ORTHOPAEDIC SURGERY

## 2022-07-26 PROCEDURE — 1159F PR MEDICATION LIST DOCUMENTED IN MEDICAL RECORD: ICD-10-PCS | Mod: CPTII,,, | Performed by: ORTHOPAEDIC SURGERY

## 2022-07-26 PROCEDURE — 99214 PR OFFICE/OUTPT VISIT, EST, LEVL IV, 30-39 MIN: ICD-10-PCS | Mod: ,,, | Performed by: ORTHOPAEDIC SURGERY

## 2022-07-26 PROCEDURE — 1159F MED LIST DOCD IN RCRD: CPT | Mod: CPTII,,, | Performed by: ORTHOPAEDIC SURGERY

## 2022-07-26 NOTE — PROGRESS NOTES
Past Medical History:   Diagnosis Date    ARDS (adult respiratory distress syndrome)     Arthritis     Thyroid disease        Past Surgical History:   Procedure Laterality Date    ARTHROCENTESIS OF HIP JOINT Left 2022    Procedure: ARTHROCENTESIS, HIP aspiration under fluroscopy;  Surgeon: Michel Peña MD;  Location: [facility];  Service: Orthopedics;  Laterality: Left;     SECTION      x 2    INSERTION OF PERCUTANEOUS ENDOSCOPIC GASTROSTOMY (PEG) FEEDING TUBE      KNEE ARTHROSCOPY Right     LAPAROSCOPIC CHOLECYSTECTOMY      ORIF HIP FRACTURE Left     X2    PEG TUBE REMOVAL      REMOVAL OF TRACHEOSTOMY TUBE      SINUS SURGERY      x2    TOTAL HIP ARTHROPLASTY Left     TRACHEOTOMY         Current Outpatient Medications   Medication Sig    albuterol (PROVENTIL) 2.5 mg /3 mL (0.083 %) nebulizer solution Inhale 1 mL into the lungs daily as needed.    ALBUTEROL INHL Inhale 1 puff into the lungs daily as needed.    b complex vitamins capsule Take 1 capsule by mouth once daily.    calcium carbonate (OS-LOGAN) 600 mg calcium (1,500 mg) Tab     clonazePAM (KLONOPIN) 0.5 MG tablet Take 0.5 mg by mouth 2 (two) times daily as needed.    collagenase Clostridium hist. (COLLAGENASE CLOS HIST., BULK,) Powd 1 Scoop by Other route daily as needed.    fluticasone furoate-vilanteroL (BREO ELLIPTA) 100-25 mcg/dose diskus inhaler Inhale 1 puff into the lungs daily as needed.    gabapentin (NEURONTIN) 300 MG capsule Take 1 capsule (300 mg total) by mouth 2 (two) times daily.    levothyroxine (SYNTHROID) 88 MCG tablet Take 1 tablet by mouth every morning.    meloxicam (MOBIC) 15 MG tablet Take 1 tablet (15 mg total) by mouth once daily.    multivitamin with minerals tablet Take 1 tablet by mouth once daily at 6am.    ascorbic acid, vitamin C, (VITAMIN C) 500 MG tablet Take 500 mg by mouth.    fluticasone propionate (FLOVENT HFA) 110 mcg/actuation inhaler Inhale 2 puffs into the lungs daily as needed.     gabapentin (NEURONTIN) 300 MG capsule Take 300 mg by mouth 2 (two) times daily.    meloxicam (MOBIC) 15 MG tablet Take 15 mg by mouth once daily at 6am.    meloxicam (MOBIC) 15 MG tablet Take 1 tablet (15 mg total) by mouth once daily. (Patient not taking: Reported on 6/14/2022)    meloxicam (MOBIC) 15 MG tablet Take 1 tablet (15 mg total) by mouth once daily. (Patient not taking: Reported on 6/14/2022)    nitrofurantoin, macrocrystal-monohydrate, (MACROBID) 100 MG capsule Take 100 mg by mouth 2 (two) times daily.    traMADoL (ULTRAM) 50 mg tablet Take 1 tablet (50 mg total) by mouth every 6 (six) hours. (Patient not taking: Reported on 6/14/2022)     No current facility-administered medications for this visit.       Review of patient's allergies indicates:   Allergen Reactions    Tizanidine Rash       Family History   Family history unknown: Yes       Social History     Socioeconomic History    Marital status: Single   Tobacco Use    Smoking status: Former Smoker     Types: Cigarettes    Smokeless tobacco: Never Used   Substance and Sexual Activity    Alcohol use: Yes     Alcohol/week: 1.0 standard drink     Types: 1 Glasses of wine per week    Drug use: Never    Sexual activity: Yes       Chief Complaint:   Chief Complaint   Patient presents with    Follow-up     2 mth F/u Left hip pain, pt states hip has not gotten any better, pt states pain has gotten worse some, pt has been taking the mobic and tramdol with little relief, pt said she has been feeling like a shock in her hip lately,        History of present illness:  [place] year old female presents a follow-up after conversion left hip.  He continues have significant severe pain in the left hip.  She has tried Advil observe measures including anti-inflammatories activity modifications as well as the use of a cane.  She continues to have severe pain in the left thigh.  She notes pain that goes up on her left thigh with any ambulation.  Patient  feels as though she has reached a point of disability with regard to the left hip.  She would like to proceed with surgical intervention.      Review of Systems:    Constitution: Negative for chills, fever, and sweats.  Negative for unexplained weight loss.    HENT:  Negative for headaches and blurry vision.    Cardiovascular:Negative for chest pain or irregular heart beat. Negative for hypertension.    Respiratory:  Negative for cough and shortness of breath.    Gastrointestinal: Negative for abdominal pain, heartburn, melena, nausea, and vomitting.    Genitourinary:  Negative bladder incontinence and dysuria.    Musculoskeletal:  See HPI    Neurological: Negative for numbness.    Psychiatric/Behavioral: Negative for depression.  The patient is not nervous/anxious.      Endocrine: Negative for polyuria    Hematologic/Lymphatic: Negative for bleeding problem.  Does not bruise/bleed easily.    Skin: Negative for poor would healing and rash      Physical Examination:    Vital Signs:  There were no vitals filed for this visit.    Body mass index is 39.68 kg/m².    General: No acute distress, alert and oriented, healthy appearing    HEENT: Head is atraumatic, mucous membranes are moist    Neck: Supples, no JVD    Cardiovascular: Palpable dorsalis pedis and posterior tibial pulses, regular rate and rhythm to those pulses    Lungs: Breathing non-labored    Skin: no rashes appreciated    Neurologic: Can flex and extend knees, ankles, and toes. Sensation is grossly intact    Left hip:  Range of motion left hip significant pain and discomfort.  Positive Stinchfield.  Internal and external rotation cause the patient significant groin and leg pain.    X-rays:      Assessment::  Failed total hip arthroplasty left hip    Plan:  Patient lives femoral component.  We discussed treatment options today.  She is interested in undergoing revision of the left hip.  The risks, benefits, alternatives to revision left hip were discussed in  detail.  All questions answered to the patient's satisfaction.  No guarantees made.  Despite these risks she would like to proceed with surgical intervention.    This note was created using [employer] voice recognition software that occasionally misinterpreted phrases or words.    Consult note is delivered via Epic messaging service.

## 2022-08-05 ENCOUNTER — OFFICE VISIT (OUTPATIENT)
Dept: ORTHOPEDICS | Facility: CLINIC | Age: 42
End: 2022-08-05
Payer: COMMERCIAL

## 2022-08-05 ENCOUNTER — HOSPITAL ENCOUNTER (OUTPATIENT)
Dept: RADIOLOGY | Facility: HOSPITAL | Age: 42
Discharge: HOME | End: 2022-08-05
Attending: NURSE PRACTITIONER
Payer: COMMERCIAL

## 2022-08-05 VITALS — BODY MASS INDEX: 39.65 KG/M2 | HEIGHT: 61 IN | WEIGHT: 210 LBS

## 2022-08-05 DIAGNOSIS — Z01.818 PREOPERATIVE TESTING: ICD-10-CM

## 2022-08-05 DIAGNOSIS — Z96.649 FAILED TOTAL HIP ARTHROPLASTY, INITIAL ENCOUNTER: Primary | ICD-10-CM

## 2022-08-05 DIAGNOSIS — Z96.649 CHRONIC HIP PAIN AFTER TOTAL REPLACEMENT OF HIP JOINT: ICD-10-CM

## 2022-08-05 DIAGNOSIS — M25.559 CHRONIC HIP PAIN AFTER TOTAL REPLACEMENT OF HIP JOINT: ICD-10-CM

## 2022-08-05 DIAGNOSIS — T84.018A FAILED TOTAL HIP ARTHROPLASTY, INITIAL ENCOUNTER: ICD-10-CM

## 2022-08-05 DIAGNOSIS — G89.29 CHRONIC HIP PAIN AFTER TOTAL REPLACEMENT OF HIP JOINT: ICD-10-CM

## 2022-08-05 DIAGNOSIS — Z96.649 FAILED TOTAL HIP ARTHROPLASTY, INITIAL ENCOUNTER: ICD-10-CM

## 2022-08-05 DIAGNOSIS — T84.018A FAILED TOTAL HIP ARTHROPLASTY, INITIAL ENCOUNTER: Primary | ICD-10-CM

## 2022-08-05 DIAGNOSIS — T84.019A FAILED TOTAL JOINT REPLACEMENT: ICD-10-CM

## 2022-08-05 LAB
APPEARANCE UR: CLEAR
BILIRUB UR QL STRIP.AUTO: NEGATIVE MG/DL
COLOR UR AUTO: YELLOW
GLUCOSE UR QL STRIP.AUTO: NEGATIVE MG/DL
KETONES UR QL STRIP.AUTO: NEGATIVE MG/DL
LEUKOCYTE ESTERASE UR QL STRIP.AUTO: NEGATIVE UNIT/L
NITRITE UR QL STRIP.AUTO: NEGATIVE
PH UR STRIP.AUTO: 6 [PH]
PROT UR QL STRIP.AUTO: NEGATIVE MG/DL
RBC UR QL AUTO: NEGATIVE UNIT/L
SP GR UR STRIP.AUTO: 1.01
UROBILINOGEN UR STRIP-ACNC: 0.2 MG/DL

## 2022-08-05 PROCEDURE — 71046 X-RAY EXAM CHEST 2 VIEWS: CPT | Mod: TC

## 2022-08-05 PROCEDURE — 3008F BODY MASS INDEX DOCD: CPT | Mod: CPTII,,, | Performed by: ORTHOPAEDIC SURGERY

## 2022-08-05 PROCEDURE — 3008F PR BODY MASS INDEX (BMI) DOCUMENTED: ICD-10-PCS | Mod: CPTII,,, | Performed by: ORTHOPAEDIC SURGERY

## 2022-08-05 PROCEDURE — 99213 OFFICE O/P EST LOW 20 MIN: CPT | Mod: ,,, | Performed by: ORTHOPAEDIC SURGERY

## 2022-08-05 PROCEDURE — 1159F PR MEDICATION LIST DOCUMENTED IN MEDICAL RECORD: ICD-10-PCS | Mod: CPTII,,, | Performed by: ORTHOPAEDIC SURGERY

## 2022-08-05 PROCEDURE — 99213 PR OFFICE/OUTPT VISIT, EST, LEVL III, 20-29 MIN: ICD-10-PCS | Mod: ,,, | Performed by: ORTHOPAEDIC SURGERY

## 2022-08-05 PROCEDURE — 1159F MED LIST DOCD IN RCRD: CPT | Mod: CPTII,,, | Performed by: ORTHOPAEDIC SURGERY

## 2022-08-05 PROCEDURE — 1160F RVW MEDS BY RX/DR IN RCRD: CPT | Mod: CPTII,,, | Performed by: ORTHOPAEDIC SURGERY

## 2022-08-05 PROCEDURE — 1160F PR REVIEW ALL MEDS BY PRESCRIBER/CLIN PHARMACIST DOCUMENTED: ICD-10-PCS | Mod: CPTII,,, | Performed by: ORTHOPAEDIC SURGERY

## 2022-08-05 RX ORDER — TRANEXAMIC ACID 650 MG/1
1950 TABLET ORAL
Status: CANCELLED | OUTPATIENT
Start: 2022-08-05 | End: 2022-08-05

## 2022-08-05 RX ORDER — SCOLOPAMINE TRANSDERMAL SYSTEM 1 MG/1
1 PATCH, EXTENDED RELEASE TRANSDERMAL
Status: CANCELLED | OUTPATIENT
Start: 2022-08-05

## 2022-08-05 RX ORDER — GABAPENTIN 100 MG/1
600 CAPSULE ORAL
Status: CANCELLED | OUTPATIENT
Start: 2022-08-05

## 2022-08-05 RX ORDER — ACETAMINOPHEN 500 MG
1000 TABLET ORAL
Status: CANCELLED | OUTPATIENT
Start: 2022-08-05 | End: 2022-08-05

## 2022-08-05 RX ORDER — KETOROLAC TROMETHAMINE 30 MG/ML
15 INJECTION, SOLUTION INTRAMUSCULAR; INTRAVENOUS
Status: CANCELLED | OUTPATIENT
Start: 2022-08-05 | End: 2022-08-05

## 2022-08-05 RX ORDER — SODIUM CHLORIDE 9 MG/ML
INJECTION, SOLUTION INTRAVENOUS CONTINUOUS
Status: CANCELLED | OUTPATIENT
Start: 2022-08-05

## 2022-08-05 NOTE — H&P (VIEW-ONLY)
Past Medical History:   Diagnosis Date    ARDS (adult respiratory distress syndrome)     Arthritis     Thyroid disease        Past Surgical History:   Procedure Laterality Date    ARTHROCENTESIS OF HIP JOINT Left 2022    Procedure: ARTHROCENTESIS, HIP aspiration under fluroscopy;  Surgeon: Michel Peña MD;  Location: [facility];  Service: Orthopedics;  Laterality: Left;     SECTION      x 2    INSERTION OF PERCUTANEOUS ENDOSCOPIC GASTROSTOMY (PEG) FEEDING TUBE      KNEE ARTHROSCOPY Right     LAPAROSCOPIC CHOLECYSTECTOMY      ORIF HIP FRACTURE Left     X2    PEG TUBE REMOVAL      REMOVAL OF TRACHEOSTOMY TUBE      SINUS SURGERY      x2    TOTAL HIP ARTHROPLASTY Left     TRACHEOTOMY         Current Outpatient Medications   Medication Sig    albuterol (PROVENTIL) 2.5 mg /3 mL (0.083 %) nebulizer solution Inhale 1 mL into the lungs daily as needed.    ALBUTEROL INHL Inhale 1 puff into the lungs daily as needed.    ascorbic acid, vitamin C, (VITAMIN C) 500 MG tablet Take 500 mg by mouth.    b complex vitamins capsule Take 1 capsule by mouth once daily.    calcium carbonate (OS-LOGAN) 600 mg calcium (1,500 mg) Tab     clonazePAM (KLONOPIN) 0.5 MG tablet Take 0.5 mg by mouth 2 (two) times daily as needed.    collagenase Clostridium hist. (COLLAGENASE CLOS HIST., BULK,) Powd 1 Scoop by Other route daily as needed.    fluticasone furoate-vilanteroL (BREO ELLIPTA) 100-25 mcg/dose diskus inhaler Inhale 1 puff into the lungs daily as needed.    fluticasone propionate (FLOVENT HFA) 110 mcg/actuation inhaler Inhale 2 puffs into the lungs daily as needed.    gabapentin (NEURONTIN) 300 MG capsule Take 300 mg by mouth 2 (two) times daily.    gabapentin (NEURONTIN) 300 MG capsule Take 1 capsule (300 mg total) by mouth 2 (two) times daily.    levothyroxine (SYNTHROID) 88 MCG tablet Take 1 tablet by mouth every morning.    meloxicam (MOBIC) 15 MG tablet Take 15 mg by mouth once daily at 6am.     meloxicam (MOBIC) 15 MG tablet Take 1 tablet (15 mg total) by mouth once daily. (Patient not taking: Reported on 6/14/2022)    meloxicam (MOBIC) 15 MG tablet Take 1 tablet (15 mg total) by mouth once daily. (Patient not taking: Reported on 6/14/2022)    meloxicam (MOBIC) 15 MG tablet Take 1 tablet (15 mg total) by mouth once daily.    multivitamin with minerals tablet Take 1 tablet by mouth once daily at 6am.    nitrofurantoin, macrocrystal-monohydrate, (MACROBID) 100 MG capsule Take 100 mg by mouth 2 (two) times daily.    traMADoL (ULTRAM) 50 mg tablet Take 1 tablet (50 mg total) by mouth every 6 (six) hours. (Patient not taking: Reported on 6/14/2022)     No current facility-administered medications for this visit.       Review of patient's allergies indicates:   Allergen Reactions    Tizanidine Rash       Family History   Family history unknown: Yes       Social History     Socioeconomic History    Marital status: Single   Tobacco Use    Smoking status: Former Smoker     Types: Cigarettes    Smokeless tobacco: Never Used   Substance and Sexual Activity    Alcohol use: Yes     Alcohol/week: 1.0 standard drink     Types: 1 Glasses of wine per week    Drug use: Never    Sexual activity: Yes       Chief Complaint:   Chief Complaint   Patient presents with    Left Hip - Injury    Pre-op Exam     L LAVERNE revision 8/15/22 - pt is ambulating with crutches. she states no changes from last visit - td       History of present illness: Bernadette Lopez is a 42 y.o. female, presents to the clinic today in regards to left hip pain.  She does have a history of a left total hip arthroplasty that has been given her pain.  The pain is constant in nature.  Ambulating with crutches at this time.  Pain is worse with prolonged ambulation, bending, and squatting has been worked up for infection and found to be negative at this time.  Scans do show need for revision of left total hip arthroplasty.  She does feel as though she  has reached a point of disability would like to proceed with a revision of a left total hip arthroplasty.      Review of Systems:    Denies fevers, chills, chest pain, shortness of breath. Comprehensive review of systems performed and otherwise negative except as noted in HPI     Physical Examination:    General: awake and alert, no acute distress, healthy appearing  Head and Neck: Head atraumatic/normocephalic. Moist MM  CV: brisk cap refill  Lungs: non-labored breathing, w/o cough or SOB  Skin: no rashes present, warm to touch  Neuro: sensation grossly intact distally       Vital Signs:  There were no vitals filed for this visit.    Body mass index is 39.68 kg/m².    Left hip:  Range of motion left hip significant pain and discomfort.  Positive Stinchfield.  Internal and external rotation cause the patient significant groin and leg pain.       Assessment::  Failed total joint replacement, left hip    Plan:  At this point the patient is tried and failed all conservative management with regards to their revision of left hip total hip arthroplasty. They have tried and failed nonoperative management including: Anti-inflammatories, activity modification, and assistive devices. All of these have failed to completely remove their pain. They have pain putting on shoes and socks, getting in and out of a car, as well as walking on level ground. Their hip pain is affecting activities of daily living They feel that they've reached a point of disability with regards to their hip. Patient was worked up for infection and found to be negative. X-rays reveal need for revision of left total hip arthroplasty. The patient would like to proceed with surgical intervention and would be a good candidate for total hip replacement.    Total hip arthroplasty procedure, alternatives, risks, and benefits were discussed in detail. The risks including but not limited to: infection, need for revision surgery, pain, swelling, loosening, injury to  surrounding neurovascular structures, stiffness, incomplete resolution of pain, limb length discrepancy, DVT, PE, and death were discussed in detail. Despite these risks, the patient would like to proceed with surgical intervention. All questions were answered, no guarantees made. Will plan for revision of left LAVERNE on 08/15/2022.    This note was created using [employer] voice recognition software that occasionally misinterpreted phrases or words.    Consult note is delivered via Epic messaging service.

## 2022-08-05 NOTE — PROGRESS NOTES
Past Medical History:   Diagnosis Date    ARDS (adult respiratory distress syndrome)     Arthritis     Thyroid disease        Past Surgical History:   Procedure Laterality Date    ARTHROCENTESIS OF HIP JOINT Left 2022    Procedure: ARTHROCENTESIS, HIP aspiration under fluroscopy;  Surgeon: Michel Peña MD;  Location: [facility];  Service: Orthopedics;  Laterality: Left;     SECTION      x 2    INSERTION OF PERCUTANEOUS ENDOSCOPIC GASTROSTOMY (PEG) FEEDING TUBE      KNEE ARTHROSCOPY Right     LAPAROSCOPIC CHOLECYSTECTOMY      ORIF HIP FRACTURE Left     X2    PEG TUBE REMOVAL      REMOVAL OF TRACHEOSTOMY TUBE      SINUS SURGERY      x2    TOTAL HIP ARTHROPLASTY Left     TRACHEOTOMY         Current Outpatient Medications   Medication Sig    albuterol (PROVENTIL) 2.5 mg /3 mL (0.083 %) nebulizer solution Inhale 1 mL into the lungs daily as needed.    ALBUTEROL INHL Inhale 1 puff into the lungs daily as needed.    ascorbic acid, vitamin C, (VITAMIN C) 500 MG tablet Take 500 mg by mouth.    b complex vitamins capsule Take 1 capsule by mouth once daily.    calcium carbonate (OS-LOGAN) 600 mg calcium (1,500 mg) Tab     clonazePAM (KLONOPIN) 0.5 MG tablet Take 0.5 mg by mouth 2 (two) times daily as needed.    collagenase Clostridium hist. (COLLAGENASE CLOS HIST., BULK,) Powd 1 Scoop by Other route daily as needed.    fluticasone furoate-vilanteroL (BREO ELLIPTA) 100-25 mcg/dose diskus inhaler Inhale 1 puff into the lungs daily as needed.    fluticasone propionate (FLOVENT HFA) 110 mcg/actuation inhaler Inhale 2 puffs into the lungs daily as needed.    gabapentin (NEURONTIN) 300 MG capsule Take 300 mg by mouth 2 (two) times daily.    gabapentin (NEURONTIN) 300 MG capsule Take 1 capsule (300 mg total) by mouth 2 (two) times daily.    levothyroxine (SYNTHROID) 88 MCG tablet Take 1 tablet by mouth every morning.    meloxicam (MOBIC) 15 MG tablet Take 15 mg by mouth once daily at 6am.     meloxicam (MOBIC) 15 MG tablet Take 1 tablet (15 mg total) by mouth once daily. (Patient not taking: Reported on 6/14/2022)    meloxicam (MOBIC) 15 MG tablet Take 1 tablet (15 mg total) by mouth once daily. (Patient not taking: Reported on 6/14/2022)    meloxicam (MOBIC) 15 MG tablet Take 1 tablet (15 mg total) by mouth once daily.    multivitamin with minerals tablet Take 1 tablet by mouth once daily at 6am.    nitrofurantoin, macrocrystal-monohydrate, (MACROBID) 100 MG capsule Take 100 mg by mouth 2 (two) times daily.    traMADoL (ULTRAM) 50 mg tablet Take 1 tablet (50 mg total) by mouth every 6 (six) hours. (Patient not taking: Reported on 6/14/2022)     No current facility-administered medications for this visit.       Review of patient's allergies indicates:   Allergen Reactions    Tizanidine Rash       Family History   Family history unknown: Yes       Social History     Socioeconomic History    Marital status: Single   Tobacco Use    Smoking status: Former Smoker     Types: Cigarettes    Smokeless tobacco: Never Used   Substance and Sexual Activity    Alcohol use: Yes     Alcohol/week: 1.0 standard drink     Types: 1 Glasses of wine per week    Drug use: Never    Sexual activity: Yes       Chief Complaint:   Chief Complaint   Patient presents with    Left Hip - Injury    Pre-op Exam     L LAVERNE revision 8/15/22 - pt is ambulating with crutches. she states no changes from last visit - td       History of present illness: Bernadette Lopez is a 42 y.o. female, presents to the clinic today in regards to left hip pain.  She does have a history of a left total hip arthroplasty that has been given her pain.  The pain is constant in nature.  Ambulating with crutches at this time.  Pain is worse with prolonged ambulation, bending, and squatting has been worked up for infection and found to be negative at this time.  Scans do show need for revision of left total hip arthroplasty.  She does feel as though she  has reached a point of disability would like to proceed with a revision of a left total hip arthroplasty.      Review of Systems:    Denies fevers, chills, chest pain, shortness of breath. Comprehensive review of systems performed and otherwise negative except as noted in HPI     Physical Examination:    General: awake and alert, no acute distress, healthy appearing  Head and Neck: Head atraumatic/normocephalic. Moist MM  CV: brisk cap refill  Lungs: non-labored breathing, w/o cough or SOB  Skin: no rashes present, warm to touch  Neuro: sensation grossly intact distally       Vital Signs:  There were no vitals filed for this visit.    Body mass index is 39.68 kg/m².    Left hip:  Range of motion left hip significant pain and discomfort.  Positive Stinchfield.  Internal and external rotation cause the patient significant groin and leg pain.       Assessment::  Failed total joint replacement, left hip    Plan:  At this point the patient is tried and failed all conservative management with regards to their revision of left hip total hip arthroplasty. They have tried and failed nonoperative management including: Anti-inflammatories, activity modification, and assistive devices. All of these have failed to completely remove their pain. They have pain putting on shoes and socks, getting in and out of a car, as well as walking on level ground. Their hip pain is affecting activities of daily living They feel that they've reached a point of disability with regards to their hip. Patient was worked up for infection and found to be negative. X-rays reveal need for revision of left total hip arthroplasty. The patient would like to proceed with surgical intervention and would be a good candidate for total hip replacement.    Total hip arthroplasty procedure, alternatives, risks, and benefits were discussed in detail. The risks including but not limited to: infection, need for revision surgery, pain, swelling, loosening, injury to  surrounding neurovascular structures, stiffness, incomplete resolution of pain, limb length discrepancy, DVT, PE, and death were discussed in detail. Despite these risks, the patient would like to proceed with surgical intervention. All questions were answered, no guarantees made. Will plan for revision of left LAVERNE on 08/15/2022.    This note was created using My Digital Shield voice recognition software that occasionally misinterpreted phrases or words.    Consult note is delivered via Epic messaging service.

## 2022-08-11 ENCOUNTER — TELEPHONE (OUTPATIENT)
Dept: PREADMISSION TESTING | Facility: HOSPITAL | Age: 42
End: 2022-08-11
Payer: COMMERCIAL

## 2022-08-11 ENCOUNTER — ANESTHESIA EVENT (OUTPATIENT)
Dept: SURGERY | Facility: HOSPITAL | Age: 42
DRG: 468 | End: 2022-08-11
Payer: COMMERCIAL

## 2022-08-15 ENCOUNTER — ANESTHESIA (OUTPATIENT)
Dept: SURGERY | Facility: HOSPITAL | Age: 42
DRG: 468 | End: 2022-08-15
Payer: COMMERCIAL

## 2022-08-15 ENCOUNTER — HOSPITAL ENCOUNTER (INPATIENT)
Facility: HOSPITAL | Age: 42
LOS: 3 days | Discharge: HOME | DRG: 468 | End: 2022-08-18
Attending: ORTHOPAEDIC SURGERY | Admitting: ORTHOPAEDIC SURGERY
Payer: COMMERCIAL

## 2022-08-15 DIAGNOSIS — M25.559 CHRONIC HIP PAIN AFTER TOTAL REPLACEMENT OF HIP JOINT: ICD-10-CM

## 2022-08-15 DIAGNOSIS — Z01.818 PREOPERATIVE TESTING: ICD-10-CM

## 2022-08-15 DIAGNOSIS — Z96.649 CHRONIC HIP PAIN AFTER TOTAL REPLACEMENT OF HIP JOINT: ICD-10-CM

## 2022-08-15 DIAGNOSIS — Z96.649 FAILED TOTAL HIP ARTHROPLASTY, INITIAL ENCOUNTER: ICD-10-CM

## 2022-08-15 DIAGNOSIS — T84.018A FAILED TOTAL HIP ARTHROPLASTY, INITIAL ENCOUNTER: ICD-10-CM

## 2022-08-15 DIAGNOSIS — G89.29 CHRONIC HIP PAIN AFTER TOTAL REPLACEMENT OF HIP JOINT: ICD-10-CM

## 2022-08-15 DIAGNOSIS — T84.019A FAILED TOTAL JOINT REPLACEMENT: ICD-10-CM

## 2022-08-15 LAB
B-HCG UR QL: NEGATIVE
CTP QC/QA: YES
HCT VFR BLD AUTO: 40.2 % (ref 37–47)
HGB BLD-MCNC: 13.4 GM/DL (ref 12–16)
POCT GLUCOSE: 98 MG/DL (ref 70–110)

## 2022-08-15 PROCEDURE — 97605 PR NEG PRESS WOUND THERAPY (NPWT) W/NON-DISPOSABLE WOUND VAC DEVICE (DME), <=50 CM: ICD-10-PCS | Mod: ,,, | Performed by: ORTHOPAEDIC SURGERY

## 2022-08-15 PROCEDURE — 27134 REVISE HIP JOINT REPLACEMENT: CPT | Mod: AS,LT,, | Performed by: NURSE PRACTITIONER

## 2022-08-15 PROCEDURE — 36000710: Performed by: ORTHOPAEDIC SURGERY

## 2022-08-15 PROCEDURE — 63600175 PHARM REV CODE 636 W HCPCS: Performed by: NURSE ANESTHETIST, CERTIFIED REGISTERED

## 2022-08-15 PROCEDURE — 97162 PT EVAL MOD COMPLEX 30 MIN: CPT

## 2022-08-15 PROCEDURE — 94799 UNLISTED PULMONARY SVC/PX: CPT

## 2022-08-15 PROCEDURE — 63700000 PHARM REV CODE 250 ALT 637 W/O HCPCS: Performed by: ORTHOPAEDIC SURGERY

## 2022-08-15 PROCEDURE — 81025 URINE PREGNANCY TEST: CPT | Performed by: ORTHOPAEDIC SURGERY

## 2022-08-15 PROCEDURE — C1713 ANCHOR/SCREW BN/BN,TIS/BN: HCPCS | Performed by: ORTHOPAEDIC SURGERY

## 2022-08-15 PROCEDURE — A4216 STERILE WATER/SALINE, 10 ML: HCPCS | Performed by: ORTHOPAEDIC SURGERY

## 2022-08-15 PROCEDURE — 37000008 HC ANESTHESIA 1ST 15 MINUTES: Performed by: ORTHOPAEDIC SURGERY

## 2022-08-15 PROCEDURE — 63600175 PHARM REV CODE 636 W HCPCS

## 2022-08-15 PROCEDURE — 63600175 PHARM REV CODE 636 W HCPCS: Performed by: ORTHOPAEDIC SURGERY

## 2022-08-15 PROCEDURE — 71000033 HC RECOVERY, INTIAL HOUR: Performed by: ORTHOPAEDIC SURGERY

## 2022-08-15 PROCEDURE — 51701 INSERT BLADDER CATHETER: CPT

## 2022-08-15 PROCEDURE — 51798 US URINE CAPACITY MEASURE: CPT | Performed by: ORTHOPAEDIC SURGERY

## 2022-08-15 PROCEDURE — 27134 PR REVISE TOTAL HIP REPLACEMENT: ICD-10-PCS | Mod: LT,,, | Performed by: ORTHOPAEDIC SURGERY

## 2022-08-15 PROCEDURE — 25000003 PHARM REV CODE 250: Performed by: ORTHOPAEDIC SURGERY

## 2022-08-15 PROCEDURE — 11000001 HC ACUTE MED/SURG PRIVATE ROOM

## 2022-08-15 PROCEDURE — 27134 REVISE HIP JOINT REPLACEMENT: CPT | Mod: LT,,, | Performed by: ORTHOPAEDIC SURGERY

## 2022-08-15 PROCEDURE — 51798 US URINE CAPACITY MEASURE: CPT

## 2022-08-15 PROCEDURE — 63600175 PHARM REV CODE 636 W HCPCS: Performed by: NURSE PRACTITIONER

## 2022-08-15 PROCEDURE — 36000711: Performed by: ORTHOPAEDIC SURGERY

## 2022-08-15 PROCEDURE — 27201423 OPTIME MED/SURG SUP & DEVICES STERILE SUPPLY: Performed by: ORTHOPAEDIC SURGERY

## 2022-08-15 PROCEDURE — 97605 NEG PRS WND THER DME<=50SQCM: CPT | Mod: ,,, | Performed by: ORTHOPAEDIC SURGERY

## 2022-08-15 PROCEDURE — 94761 N-INVAS EAR/PLS OXIMETRY MLT: CPT

## 2022-08-15 PROCEDURE — 51702 INSERT TEMP BLADDER CATH: CPT | Performed by: ORTHOPAEDIC SURGERY

## 2022-08-15 PROCEDURE — 37000009 HC ANESTHESIA EA ADD 15 MINS: Performed by: ORTHOPAEDIC SURGERY

## 2022-08-15 PROCEDURE — 25000003 PHARM REV CODE 250: Performed by: NURSE PRACTITIONER

## 2022-08-15 PROCEDURE — 82962 GLUCOSE BLOOD TEST: CPT | Performed by: ORTHOPAEDIC SURGERY

## 2022-08-15 PROCEDURE — 71000039 HC RECOVERY, EACH ADD'L HOUR: Performed by: ORTHOPAEDIC SURGERY

## 2022-08-15 PROCEDURE — C1769 GUIDE WIRE: HCPCS | Performed by: ORTHOPAEDIC SURGERY

## 2022-08-15 PROCEDURE — 36415 COLL VENOUS BLD VENIPUNCTURE: CPT | Performed by: ORTHOPAEDIC SURGERY

## 2022-08-15 PROCEDURE — 85014 HEMATOCRIT: CPT | Performed by: ORTHOPAEDIC SURGERY

## 2022-08-15 PROCEDURE — 25000003 PHARM REV CODE 250: Performed by: NURSE ANESTHETIST, CERTIFIED REGISTERED

## 2022-08-15 PROCEDURE — 27134 PR REVISE TOTAL HIP REPLACEMENT: ICD-10-PCS | Mod: AS,LT,, | Performed by: NURSE PRACTITIONER

## 2022-08-15 PROCEDURE — C1776 JOINT DEVICE (IMPLANTABLE): HCPCS | Performed by: ORTHOPAEDIC SURGERY

## 2022-08-15 DEVICE — 6.5MM LOW PROFILE HEX SCREW 40MM
Type: IMPLANTABLE DEVICE | Site: HIP | Status: FUNCTIONAL
Brand: TRIDENT II

## 2022-08-15 DEVICE — IMPLANTABLE DEVICE: Type: IMPLANTABLE DEVICE | Site: HIP | Status: FUNCTIONAL

## 2022-08-15 DEVICE — CERAMIC V40 FEMORAL HEAD
Type: IMPLANTABLE DEVICE | Site: HIP | Status: FUNCTIONAL
Brand: BIOLOX

## 2022-08-15 DEVICE — 6.5MM LOW PROFILE HEX SCREW 20MM
Type: IMPLANTABLE DEVICE | Site: HIP | Status: FUNCTIONAL
Brand: TRIDENT II

## 2022-08-15 DEVICE — TRIDENT II TRITANIUM MULTIHOLE ACETABULAR SHELL 52E
Type: IMPLANTABLE DEVICE | Site: HIP | Status: FUNCTIONAL
Brand: TRIDENT II

## 2022-08-15 RX ORDER — METHOCARBAMOL 750 MG/1
750 TABLET, FILM COATED ORAL EVERY 8 HOURS PRN
Status: DISCONTINUED | OUTPATIENT
Start: 2022-08-15 | End: 2022-08-17

## 2022-08-15 RX ORDER — KETOROLAC TROMETHAMINE 30 MG/ML
INJECTION, SOLUTION INTRAMUSCULAR; INTRAVENOUS
Status: DISCONTINUED | OUTPATIENT
Start: 2022-08-15 | End: 2022-08-15 | Stop reason: HOSPADM

## 2022-08-15 RX ORDER — DROPERIDOL 2.5 MG/ML
0.62 INJECTION, SOLUTION INTRAMUSCULAR; INTRAVENOUS ONCE AS NEEDED
Status: DISCONTINUED | OUTPATIENT
Start: 2022-08-15 | End: 2022-08-15

## 2022-08-15 RX ORDER — MORPHINE SULFATE 4 MG/ML
4 INJECTION, SOLUTION INTRAMUSCULAR; INTRAVENOUS
Status: DISPENSED | OUTPATIENT
Start: 2022-08-15 | End: 2022-08-16

## 2022-08-15 RX ORDER — TRANEXAMIC ACID 650 MG/1
1950 TABLET ORAL
Status: COMPLETED | OUTPATIENT
Start: 2022-08-15 | End: 2022-08-15

## 2022-08-15 RX ORDER — MIDAZOLAM HYDROCHLORIDE 1 MG/ML
INJECTION INTRAMUSCULAR; INTRAVENOUS
Status: DISCONTINUED | OUTPATIENT
Start: 2022-08-15 | End: 2022-08-15

## 2022-08-15 RX ORDER — ACETAMINOPHEN 500 MG
500 TABLET ORAL EVERY 4 HOURS
Status: DISCONTINUED | OUTPATIENT
Start: 2022-08-15 | End: 2022-08-16

## 2022-08-15 RX ORDER — NAPROXEN SODIUM 220 MG/1
81 TABLET, FILM COATED ORAL 2 TIMES DAILY
Status: DISCONTINUED | OUTPATIENT
Start: 2022-08-16 | End: 2022-08-18 | Stop reason: HOSPADM

## 2022-08-15 RX ORDER — LEVOTHYROXINE SODIUM 88 UG/1
88 TABLET ORAL EVERY MORNING
Status: DISCONTINUED | OUTPATIENT
Start: 2022-08-15 | End: 2022-08-18 | Stop reason: HOSPADM

## 2022-08-15 RX ORDER — METOCLOPRAMIDE HYDROCHLORIDE 5 MG/ML
10 INJECTION INTRAMUSCULAR; INTRAVENOUS
Status: COMPLETED | OUTPATIENT
Start: 2022-08-15 | End: 2022-08-16

## 2022-08-15 RX ORDER — GABAPENTIN 300 MG/1
300 CAPSULE ORAL NIGHTLY
Status: DISCONTINUED | OUTPATIENT
Start: 2022-08-15 | End: 2022-08-18 | Stop reason: HOSPADM

## 2022-08-15 RX ORDER — ONDANSETRON 2 MG/ML
INJECTION INTRAMUSCULAR; INTRAVENOUS
Status: DISCONTINUED | OUTPATIENT
Start: 2022-08-15 | End: 2022-08-15

## 2022-08-15 RX ORDER — BUPIVACAINE HYDROCHLORIDE 7.5 MG/ML
INJECTION, SOLUTION EPIDURAL; RETROBULBAR
Status: COMPLETED | OUTPATIENT
Start: 2022-08-15 | End: 2022-08-15

## 2022-08-15 RX ORDER — METOCLOPRAMIDE HYDROCHLORIDE 5 MG/ML
10 INJECTION INTRAMUSCULAR; INTRAVENOUS EVERY 10 MIN PRN
Status: DISCONTINUED | OUTPATIENT
Start: 2022-08-15 | End: 2022-08-18 | Stop reason: HOSPADM

## 2022-08-15 RX ORDER — LIDOCAINE HYDROCHLORIDE 20 MG/ML
INJECTION, SOLUTION EPIDURAL; INFILTRATION; INTRACAUDAL; PERINEURAL
Status: DISCONTINUED | OUTPATIENT
Start: 2022-08-15 | End: 2022-08-15

## 2022-08-15 RX ORDER — POLYETHYLENE GLYCOL 3350 17 G/17G
17 POWDER, FOR SOLUTION ORAL NIGHTLY
Status: DISCONTINUED | OUTPATIENT
Start: 2022-08-15 | End: 2022-08-18 | Stop reason: HOSPADM

## 2022-08-15 RX ORDER — ACETAMINOPHEN 500 MG
1000 TABLET ORAL
Status: COMPLETED | OUTPATIENT
Start: 2022-08-15 | End: 2022-08-15

## 2022-08-15 RX ORDER — TALC
6 POWDER (GRAM) TOPICAL NIGHTLY PRN
Status: DISCONTINUED | OUTPATIENT
Start: 2022-08-15 | End: 2022-08-18 | Stop reason: HOSPADM

## 2022-08-15 RX ORDER — KETOROLAC TROMETHAMINE 30 MG/ML
15 INJECTION, SOLUTION INTRAMUSCULAR; INTRAVENOUS EVERY 6 HOURS
Status: COMPLETED | OUTPATIENT
Start: 2022-08-15 | End: 2022-08-16

## 2022-08-15 RX ORDER — ACETAMINOPHEN 10 MG/ML
1000 INJECTION, SOLUTION INTRAVENOUS ONCE
Status: COMPLETED | OUTPATIENT
Start: 2022-08-15 | End: 2022-08-15

## 2022-08-15 RX ORDER — GLYCOPYRROLATE 0.2 MG/ML
INJECTION INTRAMUSCULAR; INTRAVENOUS
Status: DISCONTINUED | OUTPATIENT
Start: 2022-08-15 | End: 2022-08-15

## 2022-08-15 RX ORDER — OXYCODONE AND ACETAMINOPHEN 5; 325 MG/1; MG/1
1 TABLET ORAL EVERY 4 HOURS PRN
Status: DISCONTINUED | OUTPATIENT
Start: 2022-08-15 | End: 2022-08-18 | Stop reason: HOSPADM

## 2022-08-15 RX ORDER — SODIUM CHLORIDE 0.9 G/100ML
IRRIGANT IRRIGATION
Status: DISCONTINUED | OUTPATIENT
Start: 2022-08-15 | End: 2022-08-15 | Stop reason: HOSPADM

## 2022-08-15 RX ORDER — MAG HYDROX/ALUMINUM HYD/SIMETH 200-200-20
30 SUSPENSION, ORAL (FINAL DOSE FORM) ORAL EVERY 6 HOURS PRN
Status: DISCONTINUED | OUTPATIENT
Start: 2022-08-15 | End: 2022-08-18 | Stop reason: HOSPADM

## 2022-08-15 RX ORDER — SODIUM CHLORIDE 9 MG/ML
INJECTION, SOLUTION INTRAMUSCULAR; INTRAVENOUS; SUBCUTANEOUS
Status: DISCONTINUED | OUTPATIENT
Start: 2022-08-15 | End: 2022-08-15 | Stop reason: HOSPADM

## 2022-08-15 RX ORDER — FAMOTIDINE 20 MG/1
20 TABLET, FILM COATED ORAL 2 TIMES DAILY
Status: DISCONTINUED | OUTPATIENT
Start: 2022-08-15 | End: 2022-08-18 | Stop reason: HOSPADM

## 2022-08-15 RX ORDER — ROPIVACAINE HYDROCHLORIDE 5 MG/ML
INJECTION, SOLUTION EPIDURAL; INFILTRATION; PERINEURAL
Status: DISPENSED
Start: 2022-08-15 | End: 2022-08-15

## 2022-08-15 RX ORDER — KETOROLAC TROMETHAMINE 30 MG/ML
15 INJECTION, SOLUTION INTRAMUSCULAR; INTRAVENOUS
Status: COMPLETED | OUTPATIENT
Start: 2022-08-15 | End: 2022-08-15

## 2022-08-15 RX ORDER — SCOLOPAMINE TRANSDERMAL SYSTEM 1 MG/1
1 PATCH, EXTENDED RELEASE TRANSDERMAL
Status: DISCONTINUED | OUTPATIENT
Start: 2022-08-15 | End: 2022-08-15

## 2022-08-15 RX ORDER — HYDROMORPHONE HYDROCHLORIDE 2 MG/ML
0.4 INJECTION, SOLUTION INTRAMUSCULAR; INTRAVENOUS; SUBCUTANEOUS EVERY 5 MIN PRN
Status: DISCONTINUED | OUTPATIENT
Start: 2022-08-15 | End: 2022-08-15

## 2022-08-15 RX ORDER — SODIUM CHLORIDE 0.9 % (FLUSH) 0.9 %
SYRINGE (ML) INJECTION
Status: DISPENSED
Start: 2022-08-15 | End: 2022-08-15

## 2022-08-15 RX ORDER — MORPHINE SULFATE 10 MG/ML
INJECTION INTRAMUSCULAR; INTRAVENOUS; SUBCUTANEOUS
Status: DISPENSED
Start: 2022-08-15 | End: 2022-08-15

## 2022-08-15 RX ORDER — PROPOFOL 10 MG/ML
VIAL (ML) INTRAVENOUS CONTINUOUS PRN
Status: DISCONTINUED | OUTPATIENT
Start: 2022-08-15 | End: 2022-08-15

## 2022-08-15 RX ORDER — CEFAZOLIN SODIUM 2 G/100ML
2 INJECTION, SOLUTION INTRAVENOUS
Status: COMPLETED | OUTPATIENT
Start: 2022-08-15 | End: 2022-08-16

## 2022-08-15 RX ORDER — DEXAMETHASONE SODIUM PHOSPHATE 4 MG/ML
INJECTION, SOLUTION INTRA-ARTICULAR; INTRALESIONAL; INTRAMUSCULAR; INTRAVENOUS; SOFT TISSUE
Status: DISCONTINUED | OUTPATIENT
Start: 2022-08-15 | End: 2022-08-15

## 2022-08-15 RX ORDER — TRAMADOL HYDROCHLORIDE 50 MG/1
50 TABLET ORAL EVERY 4 HOURS PRN
Status: DISCONTINUED | OUTPATIENT
Start: 2022-08-15 | End: 2022-08-15

## 2022-08-15 RX ORDER — KETOROLAC TROMETHAMINE 30 MG/ML
INJECTION, SOLUTION INTRAMUSCULAR; INTRAVENOUS
Status: DISPENSED
Start: 2022-08-15 | End: 2022-08-15

## 2022-08-15 RX ORDER — LACTULOSE 10 G/15ML
20 SOLUTION ORAL EVERY 6 HOURS PRN
Status: DISCONTINUED | OUTPATIENT
Start: 2022-08-15 | End: 2022-08-18 | Stop reason: HOSPADM

## 2022-08-15 RX ORDER — BISACODYL 10 MG
10 SUPPOSITORY, RECTAL RECTAL DAILY
Status: DISCONTINUED | OUTPATIENT
Start: 2022-08-18 | End: 2022-08-18 | Stop reason: HOSPADM

## 2022-08-15 RX ORDER — HYDROCODONE BITARTRATE AND ACETAMINOPHEN 5; 325 MG/1; MG/1
1 TABLET ORAL EVERY 4 HOURS PRN
Status: DISCONTINUED | OUTPATIENT
Start: 2022-08-15 | End: 2022-08-15

## 2022-08-15 RX ORDER — SODIUM CHLORIDE 9 MG/ML
INJECTION, SOLUTION INTRAVENOUS CONTINUOUS
Status: DISCONTINUED | OUTPATIENT
Start: 2022-08-15 | End: 2022-08-18 | Stop reason: HOSPADM

## 2022-08-15 RX ORDER — OXYCODONE AND ACETAMINOPHEN 7.5; 325 MG/1; MG/1
1 TABLET ORAL EVERY 4 HOURS PRN
Status: DISCONTINUED | OUTPATIENT
Start: 2022-08-15 | End: 2022-08-16

## 2022-08-15 RX ORDER — HYDROMORPHONE HYDROCHLORIDE 2 MG/ML
INJECTION, SOLUTION INTRAMUSCULAR; INTRAVENOUS; SUBCUTANEOUS
Status: COMPLETED
Start: 2022-08-15 | End: 2022-08-15

## 2022-08-15 RX ORDER — CEFAZOLIN SODIUM 2 G/100ML
2 INJECTION, SOLUTION INTRAVENOUS
Status: DISCONTINUED | OUTPATIENT
Start: 2022-08-15 | End: 2022-08-15

## 2022-08-15 RX ORDER — EPINEPHRINE 1 MG/ML
INJECTION, SOLUTION INTRACARDIAC; INTRAMUSCULAR; INTRAVENOUS; SUBCUTANEOUS
Status: DISPENSED
Start: 2022-08-15 | End: 2022-08-15

## 2022-08-15 RX ORDER — DOCUSATE SODIUM 100 MG/1
200 CAPSULE, LIQUID FILLED ORAL DAILY
Status: DISCONTINUED | OUTPATIENT
Start: 2022-08-16 | End: 2022-08-18 | Stop reason: HOSPADM

## 2022-08-15 RX ORDER — CALCIUM CARBONATE 200(500)MG
500 TABLET,CHEWABLE ORAL 3 TIMES DAILY PRN
Status: DISCONTINUED | OUTPATIENT
Start: 2022-08-15 | End: 2022-08-18 | Stop reason: HOSPADM

## 2022-08-15 RX ORDER — POVIDONE-IODINE 10 %
SOLUTION, NON-ORAL TOPICAL
Status: DISCONTINUED | OUTPATIENT
Start: 2022-08-15 | End: 2022-08-15 | Stop reason: HOSPADM

## 2022-08-15 RX ORDER — ONDANSETRON 2 MG/ML
4 INJECTION INTRAMUSCULAR; INTRAVENOUS EVERY 6 HOURS PRN
Status: DISCONTINUED | OUTPATIENT
Start: 2022-08-15 | End: 2022-08-17

## 2022-08-15 RX ORDER — MORPHINE SULFATE 10 MG/ML
INJECTION INTRAMUSCULAR; INTRAVENOUS; SUBCUTANEOUS
Status: DISCONTINUED | OUTPATIENT
Start: 2022-08-15 | End: 2022-08-15 | Stop reason: HOSPADM

## 2022-08-15 RX ORDER — GABAPENTIN 300 MG/1
600 CAPSULE ORAL
Status: COMPLETED | OUTPATIENT
Start: 2022-08-15 | End: 2022-08-15

## 2022-08-15 RX ORDER — EPINEPHRINE 1 MG/ML
INJECTION, SOLUTION INTRACARDIAC; INTRAMUSCULAR; INTRAVENOUS; SUBCUTANEOUS
Status: DISCONTINUED | OUTPATIENT
Start: 2022-08-15 | End: 2022-08-15 | Stop reason: HOSPADM

## 2022-08-15 RX ORDER — ROPIVACAINE HYDROCHLORIDE 5 MG/ML
INJECTION, SOLUTION EPIDURAL; INFILTRATION; PERINEURAL
Status: DISCONTINUED | OUTPATIENT
Start: 2022-08-15 | End: 2022-08-15 | Stop reason: HOSPADM

## 2022-08-15 RX ORDER — AMOXICILLIN 250 MG
2 CAPSULE ORAL 2 TIMES DAILY
Status: DISCONTINUED | OUTPATIENT
Start: 2022-08-15 | End: 2022-08-18 | Stop reason: HOSPADM

## 2022-08-15 RX ADMIN — FAMOTIDINE 20 MG: 20 TABLET ORAL at 09:08

## 2022-08-15 RX ADMIN — MORPHINE SULFATE 4 MG: 4 INJECTION INTRAVENOUS at 02:08

## 2022-08-15 RX ADMIN — ONDANSETRON HYDROCHLORIDE 4 MG: 2 SOLUTION INTRAMUSCULAR; INTRAVENOUS at 07:08

## 2022-08-15 RX ADMIN — Medication 6 MG: at 09:08

## 2022-08-15 RX ADMIN — CEFAZOLIN SODIUM 2000 MG: 2 INJECTION, SOLUTION INTRAVENOUS at 09:08

## 2022-08-15 RX ADMIN — METOCLOPRAMIDE 10 MG: 5 INJECTION, SOLUTION INTRAMUSCULAR; INTRAVENOUS at 09:08

## 2022-08-15 RX ADMIN — DEXAMETHASONE SODIUM PHOSPHATE 4 MG: 4 INJECTION, SOLUTION INTRA-ARTICULAR; INTRALESIONAL; INTRAMUSCULAR; INTRAVENOUS; SOFT TISSUE at 07:08

## 2022-08-15 RX ADMIN — METHOCARBAMOL 750 MG: 750 TABLET ORAL at 12:08

## 2022-08-15 RX ADMIN — MIDAZOLAM 2 MG: 1 INJECTION INTRAMUSCULAR; INTRAVENOUS at 07:08

## 2022-08-15 RX ADMIN — HYDROMORPHONE HYDROCHLORIDE 0.4 MG: 2 INJECTION, SOLUTION INTRAMUSCULAR; INTRAVENOUS; SUBCUTANEOUS at 10:08

## 2022-08-15 RX ADMIN — SODIUM CHLORIDE, SODIUM GLUCONATE, SODIUM ACETATE, POTASSIUM CHLORIDE AND MAGNESIUM CHLORIDE: 526; 502; 368; 37; 30 INJECTION, SOLUTION INTRAVENOUS at 08:08

## 2022-08-15 RX ADMIN — METOCLOPRAMIDE 10 MG: 5 INJECTION, SOLUTION INTRAMUSCULAR; INTRAVENOUS at 03:08

## 2022-08-15 RX ADMIN — SODIUM CHLORIDE, SODIUM GLUCONATE, SODIUM ACETATE, POTASSIUM CHLORIDE AND MAGNESIUM CHLORIDE: 526; 502; 368; 37; 30 INJECTION, SOLUTION INTRAVENOUS at 07:08

## 2022-08-15 RX ADMIN — ACETAMINOPHEN 1000 MG: 500 TABLET ORAL at 06:08

## 2022-08-15 RX ADMIN — ACETAMINOPHEN 1000 MG: 10 INJECTION INTRAVENOUS at 05:08

## 2022-08-15 RX ADMIN — HYDROCODONE BITARTRATE AND ACETAMINOPHEN 1 TABLET: 5; 325 TABLET ORAL at 03:08

## 2022-08-15 RX ADMIN — TRAMADOL HYDROCHLORIDE 50 MG: 50 TABLET, COATED ORAL at 11:08

## 2022-08-15 RX ADMIN — CEFAZOLIN SODIUM 2 G: 2 INJECTION, SOLUTION INTRAVENOUS at 07:08

## 2022-08-15 RX ADMIN — PROPOFOL 100 MCG/KG/MIN: 10 INJECTION, EMULSION INTRAVENOUS at 07:08

## 2022-08-15 RX ADMIN — KETOROLAC TROMETHAMINE 15 MG: 30 INJECTION, SOLUTION INTRAMUSCULAR at 05:08

## 2022-08-15 RX ADMIN — METHOCARBAMOL 750 MG: 750 TABLET ORAL at 09:08

## 2022-08-15 RX ADMIN — PHENYLEPHRINE HYDROCHLORIDE 30 MCG/MIN: 10 INJECTION INTRAVENOUS at 07:08

## 2022-08-15 RX ADMIN — OXYCODONE HYDROCHLORIDE AND ACETAMINOPHEN 1 TABLET: 5; 325 TABLET ORAL at 07:08

## 2022-08-15 RX ADMIN — POLYETHYLENE GLYCOL 3350 17 G: 17 POWDER, FOR SOLUTION ORAL at 09:08

## 2022-08-15 RX ADMIN — KETOROLAC TROMETHAMINE 15 MG: 30 INJECTION, SOLUTION INTRAMUSCULAR at 06:08

## 2022-08-15 RX ADMIN — LIDOCAINE HYDROCHLORIDE 50 MG: 20 INJECTION, SOLUTION EPIDURAL; INFILTRATION; INTRACAUDAL; PERINEURAL at 07:08

## 2022-08-15 RX ADMIN — GABAPENTIN 300 MG: 300 CAPSULE ORAL at 09:08

## 2022-08-15 RX ADMIN — GABAPENTIN 600 MG: 300 CAPSULE ORAL at 06:08

## 2022-08-15 RX ADMIN — SODIUM CHLORIDE: 9 INJECTION, SOLUTION INTRAVENOUS at 11:08

## 2022-08-15 RX ADMIN — CEFAZOLIN SODIUM 2000 MG: 2 INJECTION, SOLUTION INTRAVENOUS at 03:08

## 2022-08-15 RX ADMIN — BUPIVACAINE HYDROCHLORIDE 2 ML: 7.5 INJECTION, SOLUTION EPIDURAL; RETROBULBAR at 07:08

## 2022-08-15 RX ADMIN — GLYCOPYRROLATE 0.2 MG: 0.2 INJECTION INTRAMUSCULAR; INTRAVENOUS at 07:08

## 2022-08-15 RX ADMIN — TRANEXAMIC ACID 1950 MG: 650 TABLET ORAL at 06:08

## 2022-08-15 RX ADMIN — SENNOSIDES AND DOCUSATE SODIUM 2 TABLET: 50; 8.6 TABLET ORAL at 09:08

## 2022-08-15 NOTE — OP NOTE
OPERATIVE REPORT      Patient: Bernadette Lopez   : 1980    MRN: [id number]  Date: 08/15/2022      Surgeon: Michel Peña MD  Assistant: Elodia Zeng NP, [facility].  Certified first assist was necessary as a skilled set of hands and was necessary for proper patient positioning as well as assistance with closure in place with multiple implants.  Preoperative Diagnosis:  Left failed total hip arthroplasty  Postoperative Diagnosis: Same  Procedure:  Revision total hip both components  Wound VAC left hip    Anesthesiologist: No responsible provider has been recorded for the case.  OR Staff: Circulator: Lauren Hilton RN  Nurse Practitioner: DIXON Epperson  Scrub Person: ST Katlyn  Implants:   Implant Name Type Inv. Item Serial No.  Lot No. LRB No. Used Action   SHELL TRIDENT II MULTI 52MM - [id number]  SHELL TRIDENT II MULTI 52MM  [employer]. [id number] Left 1 Implanted   SCREW TRIDENT II LP HEX 6.5X40 - [id number]  SCREW TRIDENT II LP HEX 6.5X40  [employer]. [id number] Left 1 Implanted   SCREW TRIDENT II LP HEX 6.5X40 - [id number]  SCREW TRIDENT II LP HEX 6.5X40  [employer]. [id number] Left 1 Implanted   10 degree polyethylene insert    EVI [id number] Left 1 Implanted   SCREW TRIDENT II LP HEX 6.5X20 - [id number]  SCREW TRIDENT II LP HEX 6.5X20  [employer]. [id number] Left 1 Implanted   GUIDE WIRE [id number] BALL TIP - [id number]  GUIDE WIRE [id number] BALL TIP  [employer].  Left 1 Implanted and Explanted   rest mod cone body    EVI [id number] Left 1 Implanted   rest mod conical distal stem    EVI [id number] Left 1 Implanted   HEAD FEM V40 36MM +2.5MM BIO - [id number]  HEAD FEM V40 36MM +2.5MM BIO  [employer]. [id number] Left 1 Implanted     EBL: See anesthesia note  Complications: None  Disposition: To PACU, stable    Indications: Bernadette Lopez is a 42 y.o. female presenting with the aforementioned injuries/findings. The procedure is indicated to stabilize  failed on the left side.  Patient tried and failed all conservative measures.  She felt as though she has reached a point disability regard to left hip and like to proceed with surgical intervention.  Patient has been worked up for infection found to be negative.  Workup for loosening should concern for femoral loosening.  The patient is awake and alert. After thorough discussion of the risks, benefits, expected outcomes, and alternatives to surgical intervention, the patient agreed to proceed with surgical treatment.  Specific risks discussed included, but were not limited to: superficial or deep infection, wound healing complications, DVT/PE, significant bleeding requiring transfusion, damage to named anatomic structures in the immediate area including named neurovascular structures, infection, nonunion, malunion and general risks of anesthesia.  The patient voiced understanding and written as well as verbal consent was obtained by myself prior to the procedure.    Procedure Note:  The patient was brought back to the OR and placed supine on the OR table. After successful induction of anesthesia by anesthesia staff, the patient was positioned in the lateral decubitus position and all bony prominences were padded appropriately.  The surgical field was then provisionally cleansed and then prepped and draped in the usual sterile fashion.    At this time a time-out was performed, with the correct patient, site, and procedure identified.  The universal time out as well as sign your site protocols were followed.  Preoperative antibiotics were verified as administered.     Patient's previous incision was utilized entirety.  It was taken down through skin subcutaneous tissue.  The incision through the fascia in line with her muscle fibers was then performed.  Trauma retractor was placed.  Hip was internally rotated exposing the piriformis tendon and repair from the previous surgery.  This was released in a hockey-stick  style fashion.  Hip was then dislocated.  Evaluation of the femoral component showed it had subsided her meds pre-existing position following her fall.  There was not grossly loose however it was able to be removed easily with an extraction handle an extraction tool.  We then opened up the canal with a canal finer.  Confirmed no false passages with a ball-tipped guidewire.  We then turned our attention towards evaluation of the acetabulum.  Retractors were placed around the acetabulum.  Acetabulum did not appear loose on x-ray or by exam.  Upon evaluation, we did strike it with a Key elevator on the rim and we did see subtle movement.  We then we then able to remove the acetabular component.  No bone loss was noted.  We debrided all soft tissue in the acetabular vault.  We then reamed the acetabulum up to a size 51. Fifty-one got good scratch fit.  We impacted the 52 E trident 2 multi hole cup.  Cup had good initial stability and good Press-Fit.  We placed 3 screws, anterior superior, posterior superior, directly posterior.  All had excellent purchase.  We then placed a 36 E 10 degree lip liner with the hip lip superior.  We then turned our attention back to the femur.  Femur was internally rotated.  Retractors placed.  We reamed with the conical stem up to a size 17 size 17 stem had excellent stability and good cortical contact.  We impacted this into position and came to a firm and solid stop with a change in pitch.  We then prepared for the proximal body.  Over a aiming guide we reamed for the proximal body up to a size 21. Twenty-one a good proximal fit in the proximal femur.  We trialed it initially a standard in fell the patient was loose with laxity therefore went to a +10 trial.  Eventually settling on a 21+ 20 cone body.  We impacted this into position with appropriate anteversion.  We reached trialed the head and found that a +2 5 Biolox head had good range of motion excellent stability good recreation of  leg lengths.  This was impacted in position after cleaning the trunnion.  Hip was reduced taken to full range of motion.  No instability noted.  Excellent range of motion with good recreation of leg lengths felt.  We then coagulated all bleeders.  We irrigated copiously with normal saline followed Betadine lavage and more normal saline.  We then turned our attention towards closure.  Fascia was closed with a 1. Stratafix suture.  We used Vicryl in subcutaneous tissue and staples on the skin.  Given the multiple surgeries on the patient, we did elect to place a Prevena wound VAC about the incision to assist with healing and closure.        The patient was then subsequently transferred to to PACU in a stable condition.    All sponge and needle counts were correct at the end of the case.  I was present and participated in all aspects of the procedure.    Prognosis:  The patient will be kept WBAT on the ipsilateral extremity.  Patient will receive DVT prophylaxis .       This note/OR report was created with the assistance of  voice recognition software or phone  dictation.  There may be transcription errors as a result of using this technology however minimal. Effort has been made to assure accuracy of transcription but any obvious errors or omissions should be clarified with the author of the document.

## 2022-08-15 NOTE — ANESTHESIA PROCEDURE NOTES
Spinal    Diagnosis: hip pain  Patient location during procedure: OR  Start time: 8/15/2022 7:50 AM  Timeout: 8/15/2022 7:48 AM  End time: 8/15/2022 7:55 AM    Staffing  Authorizing Provider: Marcelino Linares MD  Performing Provider: Marcelino Linares MD    Preanesthetic Checklist  Completed: patient identified, IV checked, site marked, risks and benefits discussed, surgical consent, monitors and equipment checked, pre-op evaluation and timeout performed  Spinal Block  Patient position: sitting  Prep: ChloraPrep  Patient monitoring: heart rate, continuous pulse ox and frequent blood pressure checks  Approach: midline  Location: L4-5  Injection technique: single shot  CSF Fluid: clear free-flowing CSF  Needle  Needle type: Tin   Needle gauge: 25 G  Needle length: 3.5 in  Needle localization: anatomical landmarks  Assessment  Sensory level: T6  Ease of block: easy  Patient's tolerance of the procedure: comfortable throughout block  Additional Notes  Straightforward SAB, though attempted at two levels secondary to frequent os on more cephalad level.  + CSF pre and post injection.  No complications noted.    Milagros NEGRON   Medications:    Medications: bupivacaine (pf) (MARCAINE) injection 0.75% - Intraspinal   2 mL - 8/15/2022 7:55:00 AM

## 2022-08-15 NOTE — PLAN OF CARE
Problem: Adult Inpatient Plan of Care  Goal: Plan of Care Review  Outcome: Ongoing, Progressing  Goal: Patient-Specific Goal (Individualized)  Outcome: Ongoing, Progressing  Goal: Absence of Hospital-Acquired Illness or Injury  Outcome: Ongoing, Progressing  Goal: Optimal Comfort and Wellbeing  Outcome: Ongoing, Progressing  Goal: Readiness for Transition of Care  Outcome: Ongoing, Progressing     Problem: Infection  Goal: Absence of Infection Signs and Symptoms  Outcome: Ongoing, Progressing     Problem: Fall Injury Risk  Goal: Absence of Fall and Fall-Related Injury  Outcome: Ongoing, Progressing     Problem: Pain Acute  Goal: Acceptable Pain Control and Functional Ability  Outcome: Ongoing, Progressing     Problem: Adjustment to Surgery (Hip Arthroplasty)  Goal: Optimal Coping  Outcome: Ongoing, Progressing     Problem: Bleeding (Hip Arthroplasty)  Goal: Absence of Bleeding  Outcome: Ongoing, Progressing     Problem: Bowel Motility Impaired (Hip Arthroplasty)  Goal: Effective Bowel Elimination  Outcome: Ongoing, Progressing     Problem: Fluid and Electrolyte Imbalance (Hip Arthroplasty)  Goal: Fluid and Electrolyte Balance  Outcome: Ongoing, Progressing     Problem: Functional Ability Impaired (Hip Arthroplasty)  Goal: Optimal Functional Ability  Outcome: Ongoing, Progressing     Problem: Infection (Hip Arthroplasty)  Goal: Absence of Infection Signs and Symptoms  Outcome: Ongoing, Progressing     Problem: Neurovascular Compromise (Hip Arthroplasty)  Goal: Intact Neurovascular Status  Outcome: Ongoing, Progressing     Problem: Ongoing Anesthesia Effects (Hip Arthroplasty)  Goal: Anesthesia/Sedation Recovery  Outcome: Ongoing, Progressing     Problem: Pain (Hip Arthroplasty)  Goal: Acceptable Pain Control  Outcome: Ongoing, Progressing     Problem: Postoperative Nausea and Vomiting (Hip Arthroplasty)  Goal: Nausea and Vomiting Relief  Outcome: Ongoing, Progressing     Problem: Postoperative Urinary Retention  (Hip Arthroplasty)  Goal: Effective Urinary Elimination  Outcome: Ongoing, Progressing     Problem: Respiratory Compromise (Hip Arthroplasty)  Goal: Effective Oxygenation and Ventilation  Outcome: Ongoing, Progressing

## 2022-08-15 NOTE — PT/OT/SLP EVAL
"Physical Therapy Evaluation    Patient Name:  Bernadette Lopez   MRN:  [id number]    Recommendations:     Discharge Recommendations:  home, outpatient PT, home with home health (Pending progress)   Discharge Equipment Recommendations: none   Barriers to discharge: None    Assessment:     Bernadette Lopez is a 42 y.o. 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  She presents with the following impairments/functional limitations:  weakness, impaired functional mobility, impaired endurance, gait instability, decreased lower extremity function, pain, decreased ROM, edema, orthopedic precautions .    Rehab Prognosis: Good; patient would benefit from acute skilled PT services to address these deficits and reach maximum level of function.    Recent Surgery: Procedure(s) (LRB):  REVISION, TOTAL ARTHROPLASTY, HIP femur / cell saver / chetan / rest mod vs insignia / pathology (Left) Day of Surgery    Plan:     During this hospitalization, patient to be seen BID to address the identified rehab impairments via gait training, therapeutic exercises, therapeutic activities and progress toward the following goals:    · Plan of Care Expires:  08/22/22    Subjective     Chief Complaint: L hip discomfort  Patient/Family Comments/goals: "To get back to normal"  Pain/Comfort:  · Location - Side 1: Left  · Location 1: hip  · Pain Addressed 1: Pre-medicate for activity, Reposition, Nurse notified, Distraction    Patients cultural, spiritual, Worship conflicts given the current situation:      Living Environment:  Pt lives with her  and family, and also has available assistance from her mother. Pt has 1 step/threshold to enter her home. Pt reports that she was DC'd from OP PT in February, but continuing her exercises at home prior to surgery.     Prior to admission, patients level of function was modified independent: the pt was ambulating with a cane, but as her pain worsened " she switched to walking with crutches.  Equipment used at home: crutches, axillary, cane, straight, walker, rolling, bedside commode.  DME owned (not currently used): none.  Upon discharge, patient will have assistance from family.    Objective:     Communicated with DEEPAK Zamudio prior to session.  Patient found supine with  hip abduction wedge, wound vac, IV  upon PT entry to room.    General Precautions: Standard, fall   Orthopedic Precautions:LLE weight bearing as tolerated   Braces: N/A  Respiratory Status: Room air    Exams:  · Cognitive Exam:  Patient is oriented to Person, Place, Time and Situation  · RLE ROM: WFL  · RLE Strength: WFL  · LLE ROM: limited by hip precautions  · LLE Strength: NT d/t surgical side    Functional Mobility:  · Bed Mobility:     · Scooting: contact guard assistance  · Supine to Sit: stand by assistance  · Sit to Supine: minimum assistance for LLE  · Transfers:     · Sit to Stand:  contact guard assistance with rolling walker  · Bed to Chair: contact guard assistance with  rolling walker  using  Step Transfer  · Toilet Transfer: contact guard assistance with  rolling walker  using  Step Transfer, +void.  · Gait: 100ft with RW, VC to push RW instead of picking it up. step-through pattern.        Patient left up in chair with call button in reach, NSG notified and mother present.    GOALS:   Multidisciplinary Problems     Physical Therapy Goals        Problem: Physical Therapy    Goal Priority Disciplines Outcome Goal Variances Interventions   Physical Therapy Goal     PT, PT/OT Ongoing, Progressing     Description: Pt will improve functional independence by performing:    Bed mobility: mod I  Sit to stand: mod I  Ambulation x 150' with SBA with rolling walker  1 Step (Curb): SBA with rolling walker  3 Steps: SBA with B HR  Independent with total hip HEP                     History:     Past Medical History:   Diagnosis Date    ARDS (adult respiratory distress syndrome)     Arthritis      Thyroid disease        Past Surgical History:   Procedure Laterality Date    ARTHROCENTESIS OF HIP JOINT Left 2022    Procedure: ARTHROCENTESIS, HIP aspiration under fluroscopy;  Surgeon: Michel Peña MD;  Location[facility];  Service: Orthopedics;  Laterality: Left;     SECTION      x 2    INSERTION OF PERCUTANEOUS ENDOSCOPIC GASTROSTOMY (PEG) FEEDING TUBE      KNEE ARTHROSCOPY Right     LAPAROSCOPIC CHOLECYSTECTOMY      ORIF HIP FRACTURE Left     X2    PEG TUBE REMOVAL      REMOVAL OF TRACHEOSTOMY TUBE      SINUS SURGERY      x2    TOTAL HIP ARTHROPLASTY Left     TRACHEOTOMY         Time Tracking:     PT Received On:    PT Start Time: 1504     PT Stop Time: 1538  PT Total Time (min): 34 min     Billable Minutes: Evaluation 34 min      08/15/2022

## 2022-08-15 NOTE — ANESTHESIA PREPROCEDURE EVALUATION
08/15/2022  Bernadette Lopez is a 42 y.o., female.      Pre-op Assessment    I have reviewed the Patient Summary Reports.     I have reviewed the Nursing Notes. I have reviewed the NPO Status.   I have reviewed the Medications.     Review of Systems  Anesthesia Hx:  No previous Anesthesia   Denies Personal Hx of Anesthesia complications.   Social:  Non-Smoker    Hematology/Oncology:  Hematology Normal   Oncology Normal     EENT/Dental:EENT/Dental Normal   Pulmonary:   Hx of trach following prolonged intubation in 2021 after 8ft fall    Severe ARDS 2021    Unilateral vocal cord paresis   Renal/:  Renal/ Normal     Hepatic/GI:   GERD (1x/mo), well controlled    Musculoskeletal:   Arthritis  C6 fx, T5-T9 fxs -- after fall / conservative management Spine Disorders: cervical and thoracic    Neurological:   Denies TIA. Denies CVA. Seizures (febrile seizures as a child / no issues since then)    Endocrine:  Obesity / BMI > 30  Psych:  Psychiatric Normal           Physical Exam  General: Well nourished, Cooperative and Alert    Airway:  Mallampati: II   Mouth Opening: Normal  TM Distance: 4 - 6 cm  Tongue: Normal  Neck ROM: Normal ROM    Dental:  Intact        Anesthesia Plan  Type of Anesthesia, risks & benefits discussed:    Anesthesia Type: Spinal  Intra-op Monitoring Plan: Standard ASA Monitors  Post Op Pain Control Plan: multimodal analgesia  Informed Consent: Informed consent signed with the Patient and all parties understand the risks and agree with anesthesia plan.  All questions answered.   ASA Score: 3  Anesthesia Plan Notes: 8ft fall in 2021 with multiple fractures, required prolonged intubation with inability to wean 2/2 ARDS and required trach.  Per pt, decannulated after a few weeks.  No chronic issues, voice is close to normal, though pt does state she has a unilateral vocal cord paralysis.  Plan for  ISAI.    Milagros NEGRON     Ready For Surgery From Anesthesia Perspective.     .

## 2022-08-15 NOTE — TRANSFER OF CARE
"Anesthesia Transfer of Care Note    Patient: Bernadette Lopez    Procedure(s) Performed: Procedure(s) (LRB):  REVISION, TOTAL ARTHROPLASTY, HIP femur / cell saver / chetan / rest mod vs insignia / pathology (Left)    Patient location: PACU    Anesthesia Type: spinal    Transport from OR: Transported from OR on room air with adequate spontaneous ventilation    Post pain: adequate analgesia    Post assessment: no apparent anesthetic complications    Post vital signs: stable    Level of consciousness: awake, alert and oriented    Nausea/Vomiting: no nausea/vomiting    Complications: none    Transfer of care protocol was followed      Last vitals:   Visit Vitals  /73   Pulse 85   Temp 36 °C (96.8 °F)   Resp 18   Ht 5' 1" (1.549 m)   Wt 95.3 kg (210 lb)   LMP 08/06/2022 (Exact Date)   SpO2 100%   Breastfeeding No   BMI 39.68 kg/m²     "

## 2022-08-15 NOTE — PLAN OF CARE
Problem: Physical Therapy  Goal: Physical Therapy Goal  Description: Pt will improve functional independence by performing:    Bed mobility: mod I  Sit to stand: mod I  Ambulation x 150' with SBA with rolling walker  1 Step (Curb): SBA with rolling walker  3 Steps: SBA with B HR  Independent with total hip HEP    8/15/2022 1657 by Merlyn Ward, PT  Outcome: Ongoing, Progressing  8/15/2022 1656 by Merlyn Ward, PT  Outcome: Ongoing, Progressing

## 2022-08-15 NOTE — ANESTHESIA POSTPROCEDURE EVALUATION
Anesthesia Post Evaluation    Patient: Bernadette Lopez    Procedure(s) Performed: Procedure(s) (LRB):  REVISION, TOTAL ARTHROPLASTY, HIP femur / cell saver / chetan / rest mod vs insignia / pathology (Left)    Final Anesthesia Type: spinal      Patient location during evaluation: PACU  Patient participation: Yes- Able to Participate  Level of consciousness: awake and alert  Post-procedure vital signs: reviewed and stable  Pain management: adequate  Airway patency: patent    PONV status at discharge: No PONV  Anesthetic complications: no      Respiratory status: unassisted  Hydration status: euvolemic  Follow-up not needed.          Vitals Value Taken Time   /70 08/15/22 1139   Temp 36.6 °C (97.9 °F) 08/15/22 1113   Pulse 84 08/15/22 1147   Resp 16 08/15/22 1147   SpO2 95 % 08/15/22 1147         Event Time   Out of Recovery 11:05:00         Pain/Dena Score: Pain Rating Prior to Med Admin: 5 (8/15/2022 11:47 AM)  Pain Rating Post Med Admin: 3 (8/15/2022 10:59 AM)  Dena Score: 8 (8/15/2022 10:14 AM)

## 2022-08-16 LAB
ANION GAP SERPL CALC-SCNC: 7 MEQ/L
BUN SERPL-MCNC: 9.2 MG/DL (ref 7–18.7)
CALCIUM SERPL-MCNC: 8.4 MG/DL (ref 8.4–10.2)
CHLORIDE SERPL-SCNC: 108 MMOL/L (ref 98–107)
CO2 SERPL-SCNC: 25 MMOL/L (ref 22–29)
CREAT SERPL-MCNC: 0.65 MG/DL (ref 0.55–1.02)
CREAT/UREA NIT SERPL: 14
ERYTHROCYTE [DISTWIDTH] IN BLOOD BY AUTOMATED COUNT: 13.3 % (ref 11.5–17)
GFR SERPLBLD CREATININE-BSD FMLA CKD-EPI: >60 MLS/MIN/1.73/M2
GLUCOSE SERPL-MCNC: 99 MG/DL (ref 74–100)
GRAM STN SPEC: NORMAL
HCT VFR BLD AUTO: 32.4 % (ref 37–47)
HGB BLD-MCNC: 10.7 GM/DL (ref 12–16)
MCH RBC QN AUTO: 28.9 PG (ref 27–31)
MCHC RBC AUTO-ENTMCNC: 33 MG/DL (ref 33–36)
MCV RBC AUTO: 87.6 FL (ref 80–94)
NRBC BLD AUTO-RTO: 0 %
PLATELET # BLD AUTO: 242 X10(3)/MCL (ref 130–400)
PMV BLD AUTO: 10.1 FL (ref 7.4–10.4)
POTASSIUM SERPL-SCNC: 4 MMOL/L (ref 3.5–5.1)
RBC # BLD AUTO: 3.7 X10(6)/MCL (ref 4.2–5.4)
SODIUM SERPL-SCNC: 140 MMOL/L (ref 136–145)
WBC # SPEC AUTO: 14 X10(3)/MCL (ref 4.5–11.5)

## 2022-08-16 PROCEDURE — 99900035 HC TECH TIME PER 15 MIN (STAT)

## 2022-08-16 PROCEDURE — 25000003 PHARM REV CODE 250: Performed by: ORTHOPAEDIC SURGERY

## 2022-08-16 PROCEDURE — 97110 THERAPEUTIC EXERCISES: CPT

## 2022-08-16 PROCEDURE — 36415 COLL VENOUS BLD VENIPUNCTURE: CPT | Performed by: ORTHOPAEDIC SURGERY

## 2022-08-16 PROCEDURE — 80048 BASIC METABOLIC PNL TOTAL CA: CPT | Performed by: ORTHOPAEDIC SURGERY

## 2022-08-16 PROCEDURE — 97166 OT EVAL MOD COMPLEX 45 MIN: CPT

## 2022-08-16 PROCEDURE — 11000001 HC ACUTE MED/SURG PRIVATE ROOM

## 2022-08-16 PROCEDURE — 97162 PT EVAL MOD COMPLEX 30 MIN: CPT

## 2022-08-16 PROCEDURE — 25000003 PHARM REV CODE 250: Performed by: NURSE PRACTITIONER

## 2022-08-16 PROCEDURE — 97535 SELF CARE MNGMENT TRAINING: CPT

## 2022-08-16 PROCEDURE — 94799 UNLISTED PULMONARY SVC/PX: CPT

## 2022-08-16 PROCEDURE — 85027 COMPLETE CBC AUTOMATED: CPT | Performed by: ORTHOPAEDIC SURGERY

## 2022-08-16 PROCEDURE — 97530 THERAPEUTIC ACTIVITIES: CPT

## 2022-08-16 PROCEDURE — 63600175 PHARM REV CODE 636 W HCPCS: Performed by: ORTHOPAEDIC SURGERY

## 2022-08-16 PROCEDURE — 94761 N-INVAS EAR/PLS OXIMETRY MLT: CPT

## 2022-08-16 PROCEDURE — 97116 GAIT TRAINING THERAPY: CPT

## 2022-08-16 RX ORDER — OXYCODONE AND ACETAMINOPHEN 7.5; 325 MG/1; MG/1
1 TABLET ORAL EVERY 4 HOURS PRN
Status: DISCONTINUED | OUTPATIENT
Start: 2022-08-16 | End: 2022-08-18 | Stop reason: HOSPADM

## 2022-08-16 RX ORDER — DIAZEPAM 2 MG/1
2 TABLET ORAL ONCE
Status: COMPLETED | OUTPATIENT
Start: 2022-08-16 | End: 2022-08-16

## 2022-08-16 RX ORDER — DIAZEPAM 2 MG/1
2 TABLET ORAL NIGHTLY
Status: DISCONTINUED | OUTPATIENT
Start: 2022-08-16 | End: 2022-08-18 | Stop reason: HOSPADM

## 2022-08-16 RX ADMIN — OXYCODONE AND ACETAMINOPHEN 1 TABLET: 7.5; 325 TABLET ORAL at 05:08

## 2022-08-16 RX ADMIN — DIAZEPAM 2 MG: 2 TABLET ORAL at 08:08

## 2022-08-16 RX ADMIN — SENNOSIDES AND DOCUSATE SODIUM 2 TABLET: 50; 8.6 TABLET ORAL at 08:08

## 2022-08-16 RX ADMIN — ASPIRIN 81 MG CHEWABLE TABLET 81 MG: 81 TABLET CHEWABLE at 08:08

## 2022-08-16 RX ADMIN — OXYCODONE AND ACETAMINOPHEN 1 TABLET: 7.5; 325 TABLET ORAL at 08:08

## 2022-08-16 RX ADMIN — METOCLOPRAMIDE 10 MG: 5 INJECTION, SOLUTION INTRAMUSCULAR; INTRAVENOUS at 04:08

## 2022-08-16 RX ADMIN — OXYCODONE AND ACETAMINOPHEN 1 TABLET: 7.5; 325 TABLET ORAL at 12:08

## 2022-08-16 RX ADMIN — FAMOTIDINE 20 MG: 20 TABLET ORAL at 08:08

## 2022-08-16 RX ADMIN — KETOROLAC TROMETHAMINE 15 MG: 30 INJECTION, SOLUTION INTRAMUSCULAR at 11:08

## 2022-08-16 RX ADMIN — LEVOTHYROXINE SODIUM 88 MCG: 88 TABLET ORAL at 07:08

## 2022-08-16 RX ADMIN — OXYCODONE AND ACETAMINOPHEN 1 TABLET: 7.5; 325 TABLET ORAL at 04:08

## 2022-08-16 RX ADMIN — KETOROLAC TROMETHAMINE 15 MG: 30 INJECTION, SOLUTION INTRAMUSCULAR at 05:08

## 2022-08-16 RX ADMIN — POLYETHYLENE GLYCOL 3350 17 G: 17 POWDER, FOR SOLUTION ORAL at 08:08

## 2022-08-16 RX ADMIN — METHOCARBAMOL 750 MG: 750 TABLET ORAL at 11:08

## 2022-08-16 RX ADMIN — KETOROLAC TROMETHAMINE 15 MG: 30 INJECTION, SOLUTION INTRAMUSCULAR at 12:08

## 2022-08-16 RX ADMIN — OXYCODONE AND ACETAMINOPHEN 1 TABLET: 7.5; 325 TABLET ORAL at 01:08

## 2022-08-16 RX ADMIN — OXYCODONE AND ACETAMINOPHEN 1 TABLET: 7.5; 325 TABLET ORAL at 09:08

## 2022-08-16 RX ADMIN — DIAZEPAM 2 MG: 2 TABLET ORAL at 09:08

## 2022-08-16 RX ADMIN — METOCLOPRAMIDE 10 MG: 5 INJECTION, SOLUTION INTRAMUSCULAR; INTRAVENOUS at 08:08

## 2022-08-16 RX ADMIN — GABAPENTIN 300 MG: 300 CAPSULE ORAL at 08:08

## 2022-08-16 RX ADMIN — METHOCARBAMOL 750 MG: 750 TABLET ORAL at 08:08

## 2022-08-16 RX ADMIN — DOCUSATE SODIUM 200 MG: 100 CAPSULE, LIQUID FILLED ORAL at 05:08

## 2022-08-16 RX ADMIN — CEFAZOLIN SODIUM 2000 MG: 2 INJECTION, SOLUTION INTRAVENOUS at 04:08

## 2022-08-16 NOTE — PLAN OF CARE
Problem: Adult Inpatient Plan of Care  Goal: Plan of Care Review  8/16/2022 1046 by Liudmila Mcgrath RN  Outcome: Ongoing, Progressing  8/16/2022 1042 by Liudmila Mcgrath RN  Outcome: Ongoing, Progressing  Goal: Patient-Specific Goal (Individualized)  8/16/2022 1046 by Liudmila Mcgrath RN  Outcome: Ongoing, Progressing  8/16/2022 1042 by Liudmila Mcgrath RN  Outcome: Ongoing, Progressing  Goal: Absence of Hospital-Acquired Illness or Injury  8/16/2022 1046 by Liudmila Mcgrath RN  Outcome: Ongoing, Progressing  8/16/2022 1042 by Liudmila Mcgrath RN  Outcome: Ongoing, Progressing  Goal: Optimal Comfort and Wellbeing  8/16/2022 1046 by Liudmila Mcgrath RN  Outcome: Ongoing, Progressing  8/16/2022 1042 by Liudmila Mcgrath RN  Outcome: Ongoing, Progressing  Goal: Readiness for Transition of Care  8/16/2022 1046 by Liudmila Mcgrath RN  Outcome: Ongoing, Progressing  8/16/2022 1042 by Liudmila Mcgrath RN  Outcome: Ongoing, Progressing

## 2022-08-16 NOTE — PLAN OF CARE
Problem: Occupational Therapy  Goal: Occupational Therapy Goal  Description: Pt will perform LB dressing Mod I by d/c.  Pt will perform shower t/f SB assist by d/c.  Pt will perform car t/f Mod I in adherence to pxns by d/c.   Outcome: Ongoing, Progressing

## 2022-08-16 NOTE — PLAN OF CARE
Problem: Physical Therapy  Goal: Physical Therapy Goal  Description: Pt will improve functional independence by performing:    Bed mobility: mod I  Sit to stand: mod I  Revised 08/16/2022 Ambulation x 200' with SBA with rolling walker  MET 08/16/2022 1 Step (Curb): SBA with rolling walker  3 Steps: SBA with B HR  Independent with total hip HEP    Outcome: Ongoing, Progressing

## 2022-08-16 NOTE — PLAN OF CARE
S/p rev THR. Spk w pt ... , Matthew & teen daughter to asst w homecare. Pt has RW, BSC, and SHOWER CHAIR. Provider list given. Foc obtained.   Called referral for outpatient therapy to [facility] They will contact pt with appt date & time.   PCP: Bull Cabrera  [facility]: [facility] ( [facility])

## 2022-08-16 NOTE — PT/OT/SLP PROGRESS
"Physical Therapy Treatment    Patient Name:  Bernadette Lopez   MRN:  [id number]    Recommendations:     Discharge Recommendations:  outpatient PT   Discharge Equipment Recommendations: bedside commode, walker, rolling   Barriers to discharge: None    Assessment:     Bernadette Lopez is a 42 y.o. 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  She presents with the following impairments/functional limitations:  weakness, gait instability, decreased ROM, impaired endurance, impaired balance, decreased safety awareness, impaired muscle length, orthopedic precautions, pain, impaired functional mobility.    Rehab Prognosis: Good; patient would benefit from acute skilled PT services to address these deficits and reach maximum level of function.    Recent Surgery: Procedure(s) (LRB):  REVISION, TOTAL ARTHROPLASTY, HIP femur / cell saver / chetan / rest mod vs insignia / pathology (Left) 1 Day Post-Op    Plan:     During this hospitalization, patient to be seen BID to address the identified rehab impairments via gait training, therapeutic activities, therapeutic exercises and progress toward the following goals:    · Plan of Care Expires:  08/22/22    Subjective     Chief Complaint:   Patient/Family Comments/goals:   Pain/Comfort:  · Pain Rating 1: 5/10  · Location - Side 1: Left  · Location 1: hip  · Pain Addressed 1: Pre-medicate for activity  · Pain Rating Post-Intervention 1: 5/10      Objective:     Communicated with RN prior to session.  Patient found supine with   upon PT entry to room.     General Precautions: Standard, fall   Orthopedic Precautions:LLE weight bearing as tolerated, LLE posterior precautions   Braces: N/A  Respiratory Status: Room air     Functional Mobility:  · Transfers:     · Sit to Stand:  stand by assistance with rolling walker  · Gait: 150' with RW and min A, additional 150' following seated rest break  · Stairs:  Pt ascended/descended 4" curb " step with Rolling Walker with no handrails with Stand-by Assistance.         Patient left up in chair with all lines intact and call button in reach..    GOALS:   Multidisciplinary Problems     Physical Therapy Goals        Problem: Physical Therapy    Goal Priority Disciplines Outcome Goal Variances Interventions   Physical Therapy Goal     PT, PT/OT Ongoing, Progressing     Description: Pt will improve functional independence by performing:    Bed mobility: mod I  Sit to stand: mod I  Revised 08/16/2022 Ambulation x 200' with SBA with rolling walker  MET 08/16/2022 1 Step (Curb): SBA with rolling walker  3 Steps: SBA with B HR  Independent with total hip HEP                     Time Tracking:     PT Received On:    PT Start Time: 1048     PT Stop Time: 1120  PT Total Time (min): 32 min     Billable Minutes: Gait Training 20 and Therapeutic Activity 12    Treatment Type: Treatment  PT/PTA: PT           08/16/2022

## 2022-08-16 NOTE — PT/OT/SLP EVAL
Occupational Therapy   Evaluation    Name: Bernadette Lopez  MRN: [id number]  Admitting Diagnosis:  Chronic hip pain after total replacement of hip joint  Recent Surgery: Procedure(s) (LRB):  REVISION, TOTAL ARTHROPLASTY, HIP femur / cell saver / chetan / rest mod vs insignia / pathology (Left) 1 Day Post-Op    Recommendations:     Discharge Recommendations: home, outpatient PT  Discharge Equipment Recommendations:  bedside commode, walker, rolling, hip kit, shower chair  Barriers to discharge:       Assessment:     Bernadette Lopez is a 42 y.o. 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  She presents with good motivation to heal and move better than before. Performance deficits affecting function: weakness, impaired endurance, impaired self care skills, impaired functional mobility, decreased lower extremity function, pain, decreased ROM, orthopedic precautions.      Rehab Prognosis: Good; patient would benefit from acute skilled OT services to address these deficits and reach maximum level of function.       Plan:     Patient to be seen   to address the above listed problems via self-care/home management, therapeutic activities  · Plan of Care Expires:    · Plan of Care Reviewed with: patient, daughter    Subjective     Chief Complaint: none  Patient/Family Comments/goals: move better than before    Occupational Profile:  Living Environment: 1-level home, WIS with 6 inch threshold  Previous level of function: pt has needed assist with selfcare and IADLs due pain limiting performance. Uses crutches to ambulate  Roles and Routines: self-care, child rearing  Equipment Used at Home:     Assistance upon Discharge: family    Pain/Comfort:  · Location - Side 1: Left  · Location 1: hip  · Pain Addressed 1: Pre-medicate for activity    Patients cultural, spiritual, Zoroastrianism conflicts given the current situation:      Objective:     Communicated with: nrsg prior to session.   Patient found up in chair with   upon OT entry to room.    General Precautions: Standard, fall   Orthopedic Precautions:LLE weight bearing as tolerated, LLE posterior precautions   Braces: N/A  Respiratory Status: Room air    Occupational Performance:    Functional Mobility/Transfers:  · Patient completed Sit <> Stand Transfer with modified independence  with  rolling walker   · Functional Mobility: mobilized ~75 ft +30 ft with RW. Standing rest breaks provided  · Car t/f: able to complete with min cues for use of leg  to improve performance  · Toilet T/f: Mod I    Activities of Daily Living:  · Grooming: modified independence standing at sink with RW for support  · Toileting: modified independence +void    Cognitive/Visual Perceptual:  WFL    Physical Exam:  Upper Extremity Range of Motion:     -       Right Upper Extremity: WFL  -       Left Upper Extremity: WFL  Upper Extremity Strength:    -       Right Upper Extremity: WFL  -       Left Upper Extremity: WFL    Treatment & Education:  Mobilized t/o session with RW in adherence to pxns. OT edu pt on postop pxns and the implications on transfers and self-care. Issued pt hip kit, training to occur in PM session.   Education:    Patient left up in chair with all lines intact and call button in reach    GOALS:   Multidisciplinary Problems     Occupational Therapy Goals        Problem: Occupational Therapy    Goal Priority Disciplines Outcome Interventions   Occupational Therapy Goal     OT, PT/OT Ongoing, Progressing    Description: Pt will perform LB dressing Mod I by d/c.  Pt will perform shower t/f SB assist by d/c.  Pt will perform car t/f Mod I in adherence to pxns by d/c.                    History:     Past Medical History:   Diagnosis Date    ARDS (adult respiratory distress syndrome)     Arthritis     Thyroid disease        Past Surgical History:   Procedure Laterality Date    ARTHROCENTESIS OF HIP JOINT Left 5/9/2022    Procedure: ARTHROCENTESIS,  HIP aspiration under fluroscopy;  Surgeon: Michel Peña MD;  Location: [facility] OR;  Service: Orthopedics;  Laterality: Left;     SECTION      x 2    INSERTION OF PERCUTANEOUS ENDOSCOPIC GASTROSTOMY (PEG) FEEDING TUBE      KNEE ARTHROSCOPY Right     LAPAROSCOPIC CHOLECYSTECTOMY      ORIF HIP FRACTURE Left     X2    PEG TUBE REMOVAL      REMOVAL OF TRACHEOSTOMY TUBE      REVISION TOTAL HIP ARTHROPLASTY Left 8/15/2022    Procedure: REVISION, TOTAL ARTHROPLASTY, HIP femur / cell saver / chetan / rest mod vs insignia / pathology;  Surgeon: Michel Peña MD;  Location: [facility] OR;  Service: Orthopedics;  Laterality: Left;    SINUS SURGERY      x2    TOTAL HIP ARTHROPLASTY Left     TRACHEOTOMY         Time Tracking:     OT Date of Treatment: 22  OT Start Time: 857  OT Stop Time: 934  OT Total Time (min): 37 min    Billable Minutes:Evaluation 20  Self Care/Home Management 17    2022

## 2022-08-16 NOTE — PT/OT/SLP PROGRESS
"Physical Therapy Treatment    Patient Name:  Bernadette Lopez   MRN:  [id number]    Recommendations:     Discharge Recommendations:  outpatient PT   Discharge Equipment Recommendations: bedside commode, walker, rolling   Barriers to discharge: None    Assessment:     Bernadette Lopez is a 42 y.o. 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  She presents with the following impairments/functional limitations:  weakness, impaired functional mobility, impaired endurance, gait instability, decreased lower extremity function, pain, decreased ROM, edema, orthopedic precautions .    Rehab Prognosis: Good; patient would benefit from acute skilled PT services to address these deficits and reach maximum level of function.    Recent Surgery: Procedure(s) (LRB):  REVISION, TOTAL ARTHROPLASTY, HIP femur / cell saver / chetan / rest mod vs insignia / pathology (Left) 1 Day Post-Op    Plan:     During this hospitalization, patient to be seen BID to address the identified rehab impairments via gait training, therapeutic activities, therapeutic exercises, neuromuscular re-education and progress toward the following goals:    · Plan of Care Expires:  08/22/22    Subjective     Chief Complaint: incisional burning pain  Patient/Family Comments/goals: "To get better"  Pain/Comfort:  · Location - Side 1: Left  · Location 1: hip  · Pain Addressed 1: Reposition, Distraction, Pre-medicate for activity      Objective:     Communicated with DEEPAK Nur prior to session.  Patient found HOB elevated with wound vac upon PT entry to room.     General Precautions: Standard, fall   Orthopedic Precautions:LLE weight bearing as tolerated, LLE posterior precautions   Braces: N/A  Respiratory Status: Room air     Functional Mobility:  · Bed Mobility:     · Supine to Sit: stand by assistance and with leg   · Transfers:     · Sit to Stand:  modified independence with rolling walker  · Bed to Chair: " modified independence with  rolling walker  using  Step Transfer  · Toilet Transfer: modified independence with  rolling walker  using  Step Transfer  · Gait: 200ft with 4 standing rest breaks, slow pace, pt becomes short of breath but recovers with standing breaks. Step-through pattern, SBA/CGA        Therapeutic Activities and Exercises:   LAVERNE HEP on LLE x5-10 reps each, no SLR d/t incisional burning pain.    Patient left up in chair with call button in reach..    GOALS:   Multidisciplinary Problems     Physical Therapy Goals        Problem: Physical Therapy    Goal Priority Disciplines Outcome Goal Variances Interventions   Physical Therapy Goal     PT, PT/OT Ongoing, Progressing     Description: Pt will improve functional independence by performing:    Bed mobility: mod I  Sit to stand: mod I  Revised 08/16/2022 Ambulation x 200' with SBA with rolling walker  MET 08/16/2022 1 Step (Curb): SBA with rolling walker  3 Steps: SBA with B HR  Independent with total hip HEP                     Time Tracking:     PT Received On:    PT Start Time: 1500     PT Stop Time: 1536  PT Total Time (min): 36 min     Billable Minutes: Therapeutic Activity 20 and Therapeutic Exercise 16    Treatment Type: Treatment  PT/PTA: PT           08/16/2022

## 2022-08-16 NOTE — DISCHARGE INSTRUCTIONS
[facility]  [facility]  [place] [address]  Phone [phone]       /      Fax [phone]  SURGEON: Dr. Peña    After discharge, all questions or concerns should be handled at your surgeon's office ([phone]). If it is a weekend or after hours, you will get the surgeon on call.     Discharge Medications:    PAIN MANAGEMENT: Next Dose Available   Toradol/Ketorolac 10mg (Anti-inflammatory) - take every 8 hours, around the clock, for the next 5 days 2pm           Robaxin/Methocarbamol 750mg (Muscle Relaxer) - Every 6-8 hours AS NEEDED for muscle spasms, thigh pain or additional pain control   4pm   Neurontin/Gabapentin 300mg (Nerve/Shocking Pain) - at bedtime for 2 weeks post-op PM on 8/18/22               Percocet 7.5/325mg (Oxycodone/Acetaminophen) (Pain Med) - every 4-6 hours AS NEEDED for pain 1pm       COMPLICATION PREVENTION MEDS: Next Dose DUE   Aspirin 81mg twice a day for 6 weeks post-op for blood clot prevention PM on 8/18/22   MiraLAX 17gm - once or twice a day while on narcotics and muscle relaxers for constipation prevention AM on 8/19/22   Senokot S/Magda-Colace 8.6/50mg - 2 tablets once or twice a day while on narcotics and muscle relaxers for constipation prevention AM on 8/19/22       Take a medication once or twice a day while taking TORADOL and Aspirin at the same time to prevent heartburn PM on 8/18/22           Total Hip Revision      PAIN MEDICATIONS/PAIN MANAGEMENT:  Toradol/Ketorolac 10mg (anti-inflammatory) - take every 8 hours around the clock for the next 5 days.   While on Toradol/Ketorolac and Aspirin (blood thinner) at the same time, take a medication once or twice a day to protect your stomach (for the next 5 days) (Examples: Nexium, Prilosec, Prevacid, Omeprazole, Pepcid...etc). If you start having intolerable stomach issues, discontinue the Toradol completely.     Aside from Toradol, No anti-inflammatories (Ibuprofen, Aleve, Motrin,  Naproxen, Mobic/Meloxicam, Celebrex, Diclofenac/Voltaren...etc) for 2 weeks or until the wound is healed.      Percocet 7.5/325mg (Oxycodone/Acetaminophen) (pain pill) - You can take 1 tablet every 4-6 hours for pain. If the pain is mild, take 1/2 of a pill. Once you start taking a half of a pain pill, you can take a Tylenol 325mg with each dose for a little extra pain relief without side effects. Gradually decrease the use as the pain lessens. As you decrease the Percocet, increase the Tylenol.    **NO MORE THAN 3000mg OF TYLENOL IN 24 HOURS**.     Robaxin/Methocarbamol 750mg (muscle relaxer)- you can take every 6-8 hours as needed for muscle spasms, thigh pain and stiffness, additional pain control or breakthrough pain medications. This medication is helpful for pain control while lessening your need for narcotics. Please reduce the use gradually as the pain and spasms lessen. DO NOT TAKE AT THE SAME TIME AS A PAIN PILL. YOU WILL BE BETTER SERVED WITH 2 HOURS BETWEEN PAIN PILL AND MUSCLE RELAXER.       Use the medication log in your discharge packet to keep track of your medications.      **Other things that help with pain control is WALKING, COMPRESSION WRAP, ICE and ELEVATION!!**    If you start having a lot of thigh pain and swelling, wrap your leg with an ace wrap. Start at the top of the MARCO hose stockings and wrap as high up as possible.      BLOOD CLOT PREVENTION:   Aspirin 81mg twice a day for 6 weeks. Start on the evening of 8/18/22. Stop 9/29/22.  If you were taking Baby Aspirin 81mg prior to surgery, may return to daily dose AFTER 6 weeks - 9/30/22.  You need to continuing wearing your compression stocking (MARCO Hose - ThromboEmbolic Disease Prevention Device) for the next 2-6 weeks post-op. It is ok to remove them for hygiene and at bedtime.   Hand wash and Dry. **If the swelling persists in the legs after you stop wearing the Marco hose, continue to wear them until the swelling decreases.**  REMOVE  STOCKINGS AT LEAST DAILY FOR SKIN ASSESSMENT.   Do NOT let the stockings roll down, creating a tourniquet around the back of your knee. If you need to, leave the excess at the bottom of the stocking.   The best thing you can do to prevent blood clots is to walk around as much as possible, AT LEAST EVERY 1-2 HOURS.       CONSTIPATION PREVENTION:   Miralax or Senokot S/Magda-Colace and Stool softeners EVERY DAY while on pain meds.  Use other more aggressive over the counter LAXATIVES as needed for constipation (Examples: Milk of Magnesia, Dulcolax tabs or suppository, Magnesium Citrate, Fleet's Enema...etc.)   Drink lots of water.  Increase Fiber in diet.  Increase walking distance each day  DO NOT GO TOO LONG WITHOUT HAVING A BOWEL MOVEMENT!    ACTIVITY:   Weight bearing precautions as follows: Weight bearing as tolerated to operative leg with walker.   DO NOT TAKE YOURSELF OFF OF THE WALKER TOO SOON. ALLOW YOUR OUTPATIENT THERAPIST or SURGEON TO GUIDE YOU.     STRICT HIP PRECAUTIONS  DO NOT:   Twist  Lean past 80 degrees  Cross legs    Outpatient Physical Therapy - Bring prescription to clinic of choice as soon as possible.   No heavy lifting, pulling, pushing or straining.  Pillow between legs when turning in bed at night.   Walk around at least every 1-2 hours while awake.   Have a variety of different positions throughout the day (sitting in a dependent position, legs elevated, walking, in a recliner or chair with legs straight).     WOUND CARE:   PREVENA    Remove Prevena wound vac on 8/22/22 and discard the entire system. On 8/22/22, once Prevena wound vac is removed, apply an occlusive (coverlet) dressing to the hip and change every other day and as needed, until your follow-up appointment with Dr. Peña.       DO NOT WET WOUND or apply any ointments, creams, lotions or antiseptics.  May wet incision AFTER 2 weeks- (after you follow-up with your surgeon).   Ok to shower before then if able to keep wound from  getting wet (plastic barrier, saran wrap or cling wrap and tape).   DO NOT TOUCH INCISION  Apply Ice to the hip and thigh as much as possible.      URINARY RETENTION:  If you start having difficulty urinating, decrease the use of Pain pills and muscle relaxers and notify your primary care doctor.     PNEUMONIA PREVENTION:  Stay out of bed as much as possible and walk around every 1-2 hours.  Continue breathing exercises (Incentive Spirometry) every 1-2 hours while mobility is limited and while you are on pain pills.    INFECTION PREVENTION:  Proper handwashing before and after dressing changes. Do not wet the wound. Wound care instructions as written above. NOTIFY MD OF EXCESSIVE WOUND DRAINAGE.  No alcohol, smoking or tobacco products  Pets should not be allowed around the wound or the dressing.   Treat UTI and skin infections as soon as possible.  Pre-medicate with antibiotics prior to dental or surgical procedures.   If you are diabetic, MAINTAIN GOOD BLOOD SUGAR CONTROL (Below 150) DURING YOUR RECOVERY. If you see high numbers, notify your primary care doctor.     Call your SURGEON'S OFFICE ([phone]) if you experience the following signs and symptoms of infection:   Unusual redness, swelling, excessive, cloudy or foul smelling drainage at the incision site.   Persistent low grade temp OR a temp greater than 102 F, unrelieved by Tylenol  Pain at surgical site, unrelieved by pain meds    Warning signs of a blood clot in your leg: (CALL YOUR SURGEON)  New onset or increasing pain in calf, new onset tenderness or redness above or below the knee or increasing swelling of your calf, ankle, or foot.  Warning signs that a blood clot has traveled to your lungs: (REPORT TO THE ER/CALL 367)  Sudden or increase in Shortness of breath, sudden onset of chest pains, or  Localized chest pain with coughing.         IF ANY ISSUES ARISE AND YOU FEEL THE NEED TO CALL YOUR PRIMARY CARE DOCTOR, PLEASE LET YOUR SURGEON KNOW AS WELL.      For emergencies, please report to OUR ([facility] main campus) Emergency department and tell them to call YOUR SURGEON at [phone].     BEFORE MAKING ANY CHANGES TO THE MEDICAL CARE PLAN OR GOING TO THE EMERGENCY ROOM, PLEASE CONTACT THE SURGEON.    3rd floor nursing unit # [phone]  Use this number for questions about your discharge instructions or problems filling your discharge prescriptions.

## 2022-08-16 NOTE — PT/OT/SLP PROGRESS
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 Performance deficits affecting function are pain, orthopedic precautions.     Rehab Prognosis:  Good; patient would benefit from acute skilled OT services to address these deficits and reach maximum level of function.       Plan:     Patient to be seen   to address the above listed problems via self-care/home management, therapeutic activities, therapeutic exercises  Plan of Care Expires:    Plan of Care Reviewed with: patient    Subjective     Pain/Comfort:  Pain Rating 1: 8/10  Location - Side 1: Left  Location 1: hip  Pain Addressed 1: Pre-medicate for activity, Reposition, Nurse notified  Pain Rating Post-Intervention 1: 8/10    Objective:     Communicated with: RN prior to session.  Patient found up in chair with wound vac upon OT entry to room.    General Precautions: Standard, fall   Orthopedic Precautions:LLE weight bearing as tolerated   Braces: N/A  Respiratory Status: Room air     Occupational Performance:     Bed Mobility:    Patient completed Supine to Sit with modified independence  Patient completed Sit to Supine with modified independence     Functional Mobility/Transfers:  Patient completed Sit <> Stand Transfer with modified independence  with  rolling walker   Patient completed Bed <> Chair Transfer using Step Transfer technique with modified independence with rolling walker  Patient completed Tub Transfer Step Transfer technique with stand by assistance with rolling walker and tub transfer bench  Patient completed   Shower Transfer Step Transfer technique with supervision with rolling walker  Functional Mobility: FM ~60' with RW and SBA. Distance limited due to increased pain and endurance.    Activities of Daily Living:  Lower Body Dressing: modified independence      Treatment & Education:    Patient left supine with all lines intact and call button in reach    GOALS:   Multidisciplinary Problems       Occupational Therapy Goals          Problem: Occupational Therapy    Goal Priority Disciplines Outcome Interventions   Occupational Therapy Goal     OT, PT/OT Ongoing, Progressing    Description: Pt will perform LB dressing Mod I by d/c.  Pt will perform shower t/f SB assist by d/c.  Pt will perform car t/f Mod I in adherence to pxns by d/c.                        Time Tracking:     OT Date of Treatment: 08/16/22  OT Start Time: 1308  OT Stop Time: 1340  OT Total Time (min): 32 min    Billable Minutes:Self Care/Home Management 32    OT/PRIYANKA: OT          8/16/2022

## 2022-08-17 LAB
ANION GAP SERPL CALC-SCNC: 7 MEQ/L
BUN SERPL-MCNC: 4.9 MG/DL (ref 7–18.7)
CALCIUM SERPL-MCNC: 8.6 MG/DL (ref 8.4–10.2)
CHLORIDE SERPL-SCNC: 104 MMOL/L (ref 98–107)
CO2 SERPL-SCNC: 27 MMOL/L (ref 22–29)
CREAT SERPL-MCNC: 0.65 MG/DL (ref 0.55–1.02)
CREAT/UREA NIT SERPL: 8
ERYTHROCYTE [DISTWIDTH] IN BLOOD BY AUTOMATED COUNT: 13.6 % (ref 11.5–17)
GFR SERPLBLD CREATININE-BSD FMLA CKD-EPI: >60 MLS/MIN/1.73/M2
GLUCOSE SERPL-MCNC: 95 MG/DL (ref 74–100)
HCT VFR BLD AUTO: 33.7 % (ref 37–47)
HGB BLD-MCNC: 11.2 GM/DL (ref 12–16)
MCH RBC QN AUTO: 29 PG (ref 27–31)
MCHC RBC AUTO-ENTMCNC: 33.2 MG/DL (ref 33–36)
MCV RBC AUTO: 87.3 FL (ref 80–94)
NRBC BLD AUTO-RTO: 0 %
PLATELET # BLD AUTO: 264 X10(3)/MCL (ref 130–400)
PMV BLD AUTO: 9.5 FL (ref 7.4–10.4)
POTASSIUM SERPL-SCNC: 4 MMOL/L (ref 3.5–5.1)
RBC # BLD AUTO: 3.86 X10(6)/MCL (ref 4.2–5.4)
SODIUM SERPL-SCNC: 138 MMOL/L (ref 136–145)
WBC # SPEC AUTO: 12.1 X10(3)/MCL (ref 4.5–11.5)

## 2022-08-17 PROCEDURE — 25000003 PHARM REV CODE 250: Performed by: ORTHOPAEDIC SURGERY

## 2022-08-17 PROCEDURE — 94799 UNLISTED PULMONARY SVC/PX: CPT

## 2022-08-17 PROCEDURE — 97530 THERAPEUTIC ACTIVITIES: CPT

## 2022-08-17 PROCEDURE — 85027 COMPLETE CBC AUTOMATED: CPT | Performed by: ORTHOPAEDIC SURGERY

## 2022-08-17 PROCEDURE — 25000003 PHARM REV CODE 250: Performed by: NURSE PRACTITIONER

## 2022-08-17 PROCEDURE — 94761 N-INVAS EAR/PLS OXIMETRY MLT: CPT

## 2022-08-17 PROCEDURE — 63600175 PHARM REV CODE 636 W HCPCS: Performed by: NURSE PRACTITIONER

## 2022-08-17 PROCEDURE — 63600175 PHARM REV CODE 636 W HCPCS: Performed by: ORTHOPAEDIC SURGERY

## 2022-08-17 PROCEDURE — 36415 COLL VENOUS BLD VENIPUNCTURE: CPT | Performed by: ORTHOPAEDIC SURGERY

## 2022-08-17 PROCEDURE — 80048 BASIC METABOLIC PNL TOTAL CA: CPT | Performed by: ORTHOPAEDIC SURGERY

## 2022-08-17 PROCEDURE — 97116 GAIT TRAINING THERAPY: CPT

## 2022-08-17 PROCEDURE — 97535 SELF CARE MNGMENT TRAINING: CPT

## 2022-08-17 PROCEDURE — 97110 THERAPEUTIC EXERCISES: CPT

## 2022-08-17 PROCEDURE — 11000001 HC ACUTE MED/SURG PRIVATE ROOM

## 2022-08-17 RX ORDER — DIAZEPAM 2 MG/1
2 TABLET ORAL ONCE
Status: COMPLETED | OUTPATIENT
Start: 2022-08-17 | End: 2022-08-17

## 2022-08-17 RX ORDER — METHOCARBAMOL 750 MG/1
750 TABLET, FILM COATED ORAL EVERY 6 HOURS
Qty: 56 TABLET | Refills: 0 | Status: SHIPPED | OUTPATIENT
Start: 2022-08-17 | End: 2022-08-31

## 2022-08-17 RX ORDER — ONDANSETRON 4 MG/1
4 TABLET, ORALLY DISINTEGRATING ORAL EVERY 6 HOURS PRN
Status: DISCONTINUED | OUTPATIENT
Start: 2022-08-17 | End: 2022-08-18 | Stop reason: HOSPADM

## 2022-08-17 RX ORDER — KETOROLAC TROMETHAMINE 30 MG/ML
30 INJECTION, SOLUTION INTRAMUSCULAR; INTRAVENOUS ONCE
Status: COMPLETED | OUTPATIENT
Start: 2022-08-17 | End: 2022-08-17

## 2022-08-17 RX ORDER — KETOROLAC TROMETHAMINE 10 MG/1
10 TABLET, FILM COATED ORAL EVERY 8 HOURS
Status: DISCONTINUED | OUTPATIENT
Start: 2022-08-17 | End: 2022-08-18 | Stop reason: HOSPADM

## 2022-08-17 RX ORDER — ASPIRIN 81 MG/1
81 TABLET ORAL EVERY 12 HOURS
Qty: 84 TABLET | Refills: 0 | Status: SHIPPED | OUTPATIENT
Start: 2022-08-17 | End: 2023-12-15

## 2022-08-17 RX ORDER — METHOCARBAMOL 750 MG/1
750 TABLET, FILM COATED ORAL EVERY 6 HOURS PRN
Status: DISCONTINUED | OUTPATIENT
Start: 2022-08-17 | End: 2022-08-18 | Stop reason: HOSPADM

## 2022-08-17 RX ORDER — OXYCODONE AND ACETAMINOPHEN 7.5; 325 MG/1; MG/1
1 TABLET ORAL EVERY 4 HOURS PRN
Qty: 42 TABLET | Refills: 0 | Status: SHIPPED | OUTPATIENT
Start: 2022-08-17 | End: 2022-08-24 | Stop reason: SDUPTHER

## 2022-08-17 RX ORDER — KETOROLAC TROMETHAMINE 10 MG/1
10 TABLET, FILM COATED ORAL EVERY 8 HOURS
Qty: 15 TABLET | Refills: 0 | Status: SHIPPED | OUTPATIENT
Start: 2022-08-17 | End: 2022-08-22

## 2022-08-17 RX ADMIN — GABAPENTIN 300 MG: 300 CAPSULE ORAL at 09:08

## 2022-08-17 RX ADMIN — OXYCODONE AND ACETAMINOPHEN 1 TABLET: 7.5; 325 TABLET ORAL at 09:08

## 2022-08-17 RX ADMIN — FAMOTIDINE 20 MG: 20 TABLET ORAL at 08:08

## 2022-08-17 RX ADMIN — LACTULOSE 20 G: 20 SOLUTION ORAL at 05:08

## 2022-08-17 RX ADMIN — ONDANSETRON 4 MG: 2 INJECTION INTRAMUSCULAR; INTRAVENOUS at 05:08

## 2022-08-17 RX ADMIN — LEVOTHYROXINE SODIUM 88 MCG: 88 TABLET ORAL at 07:08

## 2022-08-17 RX ADMIN — KETOROLAC TROMETHAMINE 30 MG: 30 INJECTION, SOLUTION INTRAMUSCULAR at 08:08

## 2022-08-17 RX ADMIN — ONDANSETRON 4 MG: 4 TABLET, ORALLY DISINTEGRATING ORAL at 05:08

## 2022-08-17 RX ADMIN — SENNOSIDES AND DOCUSATE SODIUM 2 TABLET: 50; 8.6 TABLET ORAL at 08:08

## 2022-08-17 RX ADMIN — OXYCODONE AND ACETAMINOPHEN 1 TABLET: 7.5; 325 TABLET ORAL at 01:08

## 2022-08-17 RX ADMIN — ASPIRIN 81 MG CHEWABLE TABLET 81 MG: 81 TABLET CHEWABLE at 08:08

## 2022-08-17 RX ADMIN — DIAZEPAM 2 MG: 2 TABLET ORAL at 11:08

## 2022-08-17 RX ADMIN — OXYCODONE AND ACETAMINOPHEN 1 TABLET: 7.5; 325 TABLET ORAL at 05:08

## 2022-08-17 RX ADMIN — DIAZEPAM 2 MG: 2 TABLET ORAL at 09:08

## 2022-08-17 RX ADMIN — METHOCARBAMOL 750 MG: 750 TABLET ORAL at 11:08

## 2022-08-17 RX ADMIN — KETOROLAC TROMETHAMINE 10 MG: 10 TABLET, FILM COATED ORAL at 01:08

## 2022-08-17 RX ADMIN — METHOCARBAMOL 750 MG: 750 TABLET ORAL at 04:08

## 2022-08-17 RX ADMIN — FAMOTIDINE 20 MG: 20 TABLET ORAL at 09:08

## 2022-08-17 RX ADMIN — METHOCARBAMOL 750 MG: 750 TABLET ORAL at 06:08

## 2022-08-17 RX ADMIN — KETOROLAC TROMETHAMINE 10 MG: 10 TABLET, FILM COATED ORAL at 09:08

## 2022-08-17 RX ADMIN — DOCUSATE SODIUM 200 MG: 100 CAPSULE, LIQUID FILLED ORAL at 08:08

## 2022-08-17 RX ADMIN — ASPIRIN 81 MG CHEWABLE TABLET 81 MG: 81 TABLET CHEWABLE at 09:08

## 2022-08-17 NOTE — PLAN OF CARE
Problem: Physical Therapy  Goal: Physical Therapy Goal  Description: Pt will improve functional independence by performing:    Bed mobility: mod I  MET 8/17/22 Sit to stand: mod I  MET 8/17/22 Ambulation x 200' with SBA with rolling walker  MET 08/16/2022 1 Step (Curb): SBA with rolling walker  MET 8/17/22 3 Steps: SBA with B HR  Independent with total hip HEP    8/17/2022 1523 by Merlyn Ward, PT  Outcome: Ongoing, Progressing  8/17/2022 1241 by Merlyn Ward, PT  Outcome: Ongoing, Progressing

## 2022-08-17 NOTE — PT/OT/SLP PROGRESS
"Physical Therapy Treatment    Patient Name:  Bernadette Lopez   MRN:  [id number]    Recommendations:     Discharge Recommendations:  outpatient PT, home   Discharge Equipment Recommendations: none   Barriers to discharge: None    Assessment:     Bernadette Lopez is a 42 y.o. 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  She presents with the following impairments/functional limitations:  weakness, impaired functional mobility, impaired endurance, gait instability, decreased lower extremity function, pain, decreased ROM, edema, orthopedic precautions .    Rehab Prognosis: Good; patient would benefit from acute skilled PT services to address these deficits and reach maximum level of function.    Recent Surgery: Procedure(s) (LRB):  REVISION, TOTAL ARTHROPLASTY, HIP femur / cell saver / chetan / rest mod vs insignia / pathology (Left) 2 Days Post-Op    Plan:     During this hospitalization, patient to be seen BID to address the identified rehab impairments via gait training, therapeutic activities, therapeutic exercises and progress toward the following goals:    · Plan of Care Expires:  08/22/22    Subjective     Chief Complaint: Pt and spouse were trying to find a new recliner chair for at home  Patient/Family Comments/goals: "To go up the stairs"  Pain/Comfort:  · Location - Side 1: Left  · Location 1: hip  · Pain Addressed 1: Pre-medicate for activity, Reposition, Distraction, Cessation of Activity      Objective:   Patient found up in chair with wound vac upon PT entry to room.     General Precautions: Standard, fall   Orthopedic Precautions:LLE weight bearing as tolerated, LLE posterior precautions   Braces:    Respiratory Status: Room air     Functional Mobility:  · Transfers:     · Sit to Stand:  modified independence with rolling walker  · Toilet Transfer: modified independence with  rolling walker  using  Step Transfer  · Gait: 100ft with RW, Mod I  · Stairs:  " Pt ascended/descended 3 stair(s) with No Assistive Device with bilateral handrails with Contact Guard/Stand by [facility].       Therapeutic Activities and Exercises:   - Pt stood at sink and washed her hands    Patient left up in chair with call button in reach and spouse present..    GOALS:   Multidisciplinary Problems     Physical Therapy Goals        Problem: Physical Therapy    Goal Priority Disciplines Outcome Goal Variances Interventions   Physical Therapy Goal     PT, PT/OT Ongoing, Progressing     Description: Pt will improve functional independence by performing:    Bed mobility: mod I  MET 8/17/22 Sit to stand: mod I  MET 8/17/22 Ambulation x 200' with SBA with rolling walker  MET 08/16/2022 1 Step (Curb): SBA with rolling walker  MET 8/17/22 3 Steps: SBA with B HR  Independent with total hip HEP                     Time Tracking:     PT Received On:    PT Start Time: 1424     PT Stop Time: 1448  PT Total Time (min): 24 min     Billable Minutes: Therapeutic Activity 24 min    Treatment Type: Treatment  PT/PTA: PT           08/17/2022

## 2022-08-17 NOTE — PLAN OF CARE
Problem: Physical Therapy  Goal: Physical Therapy Goal  Description: Pt will improve functional independence by performing:    Bed mobility: mod I  MET 8/17/22 Sit to stand: mod I  MET 8/17/22 Ambulation x 200' with SBA with rolling walker  MET 08/16/2022 1 Step (Curb): SBA with rolling walker  3 Steps: SBA with B HR  Independent with total hip HEP    Outcome: Ongoing, Progressing

## 2022-08-17 NOTE — PROGRESS NOTES
"No acute events overnight.  Pain controlled.  Resting in bed. Issue with pain overnight - improved this AM.     Vital Signs  Temp: 97.5 °F (36.4 °C)  Temp src: Oral  Pulse: 80  Heart Rate Source: Monitor  Resp: 16  SpO2: 99 %  Pulse Oximetry Type: Intermittent  Oxygen Concentration (%): 78  O2 Device (Oxygen Therapy): room air  BP: 100/67  BP Location: Left arm  BP Method: Automatic  Patient Position: Lying  Height and Weight  Height: 5' 1" (154.9 cm)  Height Method: Stated  Weight: 95.3 kg (210 lb)  Weight Method: Stated  BSA (Calculated - sq m): 2.02 sq meters  BMI (Calculated): 39.7  Weight in (lb) to have BMI = 25: 132]    +FHL/EHL  BCR distally  Dressing c/d/i  SILT distally    Recent Lab Results       08/17/22  0526        Anion Gap 7.0       BUN 4.9       BUN/CREAT RATIO 8       Calcium 8.6       Chloride 104       CO2 27       Creatinine 0.65       eGFR >60       Glucose 95       Hematocrit 33.7       Hemoglobin 11.2       MCH 29.0       MCHC 33.2       MCV 87.3       MPV 9.5       nRBC 0.0       Platelets 264       Potassium 4.0       RBC 3.86       RDW 13.6       Sodium 138       WBC 12.1             A/P:  Status post revision LAVERNE  Pain controlled  Overall patient doing well.  Therapy for mobility and ambulation.  ASA for DVT PPx  Home later today vs tomorrow  "

## 2022-08-17 NOTE — PLAN OF CARE
Problem: Occupational Therapy  Goal: Occupational Therapy Goal  Description:     Pt will perform LB dressing Mod I by d/c.  Pt will perform shower t/f SB assist by d/c.  Pt will perform car t/f Mod I in adherence to pxns by d/c.   Outcome: Met

## 2022-08-17 NOTE — PT/OT/SLP PROGRESS
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 Performance deficits affecting function are pain, orthopedic precautions.     Rehab Prognosis:  Good; patient would benefit from acute skilled OT services to address these deficits and reach maximum level of function.       Plan:     Patient to be seen   to address the above listed problems via self-care/home management, therapeutic activities, therapeutic exercises  · Plan of Care Expires:    · Plan of Care Reviewed with: patient, spouse, daughter    Subjective     Pain/Comfort:  Pain Rating 1: 5/10  Location - Side 1: Left  Location 1: hip  Pain Addressed 1: Pre-medicate for activity  Pain Rating Post-Intervention 1: 5/10    Objective:     Communicated with: RN prior to session.  Patient found up in chair with wound vac, peripheral IV upon OT entry to room.    General Precautions: Standard, fall   Orthopedic Precautions:LLE weight bearing as tolerated   Braces: N/A  Respiratory Status: Room air     Occupational Performance:     Functional Mobility/Transfers:  · Patient completed Sit <> Stand Transfer with modified independence  with  rolling walker   · Functional Mobility: Pt ambulated with mod I and RW from room 307 <> car simulator. No LOB noted.   · Pt completed a car transfer, set at 30" from the ground to simulate her mother's vehicle that pt reports she will be transferring in/out of following d/c, with SBA. Pt able to adhere to all precautions throughout.    Treatment & Education:  Pt discussing need for lift chair upon OT arrival, as she was searching " for one to purchase. OT educated pt on the cons of using a lift chair following surgery and the benefits from performing sit <> stand on her own. Pt's fiance present as well. Pt has met all OT short-term goals at this time, including car transfer goal. As a result, OT deems pt does not warrant further OT services. All questions/concerns addressed at conclusion.     Patient left up in chair with call button in reachEducation:      GOALS:   Multidisciplinary Problems     Occupational Therapy Goals     Not on file          Multidisciplinary Problems (Resolved)        Problem: Occupational Therapy    Goal Priority Disciplines Outcome Interventions   Occupational Therapy Goal   (Resolved)     OT, PT/OT Met    Description: Pt will perform LB dressing Mod I by d/c.  Pt will perform shower t/f SB assist by d/c.  Pt will perform car t/f Mod I in adherence to pxns by d/c.                    Time Tracking:     OT Date of Treatment: 08/17/22  OT Start Time: 1339  OT Stop Time: 1403  OT Total Time (min): 24 min    Billable Minutes:Therapeutic Activity 24    OT/PRIYANKA: OT          8/17/2022

## 2022-08-17 NOTE — PT/OT/SLP PROGRESS
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  She presents with calm demeanor, asleep but easily woken, c/o pain. Performance deficits affecting function are impaired self care skills, impaired functional mobility, decreased lower extremity function, pain, orthopedic precautions.     Rehab Prognosis:  Good; patient would benefit from acute skilled OT services to address these deficits and reach maximum level of function.       Plan:     Patient to be seen   to address the above listed problems via self-care/home management, therapeutic activities, therapeutic exercises  · Plan of Care Expires:    · Plan of Care Reviewed with: patient    Subjective     Pain/Comfort:  · Pain Rating 1: 8/10  · Location - Side 1: Left  · Location 1: hip  · Pain Addressed 1: Reposition  · Pain Rating Post-Intervention 1: 6/10    Objective:     Communicated with: nrsg prior to session.  Patient found HOB elevated with wound vac upon OT entry to room.    General Precautions: Standard, fall   Orthopedic Precautions:LLE weight bearing as tolerated   Braces: N/A  Respiratory Status: Room air     Occupational Performance:     Bed Mobility:    · Patient completed Supine to Sit with modified independence     Functional Mobility/Transfers:  · Patient completed Sit <> Stand Transfer with modified independence  with  rolling walker   · Patient completed Toilet Transfer Step Transfer technique with modified independence with  rolling walker and grab bars  · Functional Mobility: pt mobilized in room c RW, MOD I  level      Treatment & Education:  Pt intially agreeable to perform LB dressing, however, upon transferring oob, pt states she needed to complete bathing and other grooming tasks first. edu on the safety of this within pxns. Pt setup with items needed, refusing LH sponge due not bathing lower than knees. CNA present in room. Pt left seated UIC c sponge bathe items within reach.     Patient left up in chair with all lines intact and call button in reachEducation:      GOALS:   Multidisciplinary Problems     Occupational Therapy Goals        Problem: Occupational Therapy    Goal Priority Disciplines Outcome Interventions   Occupational Therapy Goal     OT, PT/OT Ongoing, Progressing    Description: Pt will perform LB dressing Mod I by d/c.  Pt will perform shower t/f SB assist by d/c.  Pt will perform car t/f Mod I in adherence to pxns by d/c.                    Time Tracking:     OT Date of Treatment: 08/17/22  OT Start Time: 0916  OT Stop Time: 0934  OT Total Time (min): 18 min    Billable Minutes:Self Care/Home Management 18    OT/PRIYANKA: OT          8/17/2022

## 2022-08-18 VITALS
TEMPERATURE: 98 F | OXYGEN SATURATION: 99 % | HEIGHT: 61 IN | WEIGHT: 210 LBS | SYSTOLIC BLOOD PRESSURE: 101 MMHG | DIASTOLIC BLOOD PRESSURE: 69 MMHG | HEART RATE: 84 BPM | BODY MASS INDEX: 39.65 KG/M2 | RESPIRATION RATE: 16 BRPM

## 2022-08-18 LAB
BACTERIA SPEC ANAEROBE CULT: NORMAL
VIEW PATHOLOGY REPORT (RELIAPATH): NORMAL

## 2022-08-18 PROCEDURE — 94761 N-INVAS EAR/PLS OXIMETRY MLT: CPT

## 2022-08-18 PROCEDURE — 25000003 PHARM REV CODE 250: Performed by: NURSE PRACTITIONER

## 2022-08-18 PROCEDURE — 25000003 PHARM REV CODE 250: Performed by: ORTHOPAEDIC SURGERY

## 2022-08-18 PROCEDURE — 94799 UNLISTED PULMONARY SVC/PX: CPT

## 2022-08-18 PROCEDURE — 97530 THERAPEUTIC ACTIVITIES: CPT

## 2022-08-18 RX ADMIN — FAMOTIDINE 20 MG: 20 TABLET ORAL at 09:08

## 2022-08-18 RX ADMIN — OXYCODONE AND ACETAMINOPHEN 1 TABLET: 7.5; 325 TABLET ORAL at 05:08

## 2022-08-18 RX ADMIN — KETOROLAC TROMETHAMINE 10 MG: 10 TABLET, FILM COATED ORAL at 05:08

## 2022-08-18 RX ADMIN — OXYCODONE AND ACETAMINOPHEN 1 TABLET: 7.5; 325 TABLET ORAL at 01:08

## 2022-08-18 RX ADMIN — ASPIRIN 81 MG CHEWABLE TABLET 81 MG: 81 TABLET CHEWABLE at 09:08

## 2022-08-18 RX ADMIN — METHOCARBAMOL 750 MG: 750 TABLET ORAL at 10:08

## 2022-08-18 RX ADMIN — OXYCODONE AND ACETAMINOPHEN 1 TABLET: 7.5; 325 TABLET ORAL at 09:08

## 2022-08-18 RX ADMIN — METHOCARBAMOL 750 MG: 750 TABLET ORAL at 03:08

## 2022-08-18 NOTE — NURSING
Discharge instructions given to patient. Prescription for outpatient therapy given. Patient is aware that prescriptions for norco, robaxin, and toradol are at her pharmacy. Coverlets, tape given for home dressing changes. All questions were answered. Stated understanding of all instructions.

## 2022-08-18 NOTE — PT/OT/SLP PROGRESS
"Physical Therapy Treatment    Patient Name:  Bernadette Lopez   MRN:  [id number]    Recommendations:     Discharge Recommendations:  home, outpatient PT   Discharge Equipment Recommendations: none   Barriers to discharge: None    Assessment:     Bernadette Lopez is a 42 y.o. 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  She presents with the following impairments/functional limitations:  weakness, impaired functional mobility, impaired endurance, gait instability, decreased lower extremity function, pain, decreased ROM, edema, orthopedic precautions .    Rehab Prognosis: Good; patient would benefit from acute skilled PT services to address these deficits and reach maximum level of function.    Recent Surgery: Procedure(s) (LRB):  REVISION, TOTAL ARTHROPLASTY, HIP femur / cell saver / chetan / rest mod vs insignia / pathology (Left) 3 Days Post-Op    Plan:     During this hospitalization, patient to be seen BID to address the identified rehab impairments via gait training, therapeutic activities, therapeutic exercises and progress toward the following goals:    · Plan of Care Expires:  08/22/22    Subjective     Chief Complaint: none  Patient/Family Comments/goals: "to go home today"  Pain/Comfort:  · Location - Side 1: Left  · Location 1: hip  · Pain Addressed 1: Pre-medicate for activity, Reposition, Distraction, Cessation of Activity      Objective:     Communicated with NSG prior to session.  Patient found up in chair with wound vac upon PT entry to room.     General Precautions: Standard, fall   Orthopedic Precautions:LLE weight bearing as tolerated, LLE posterior precautions   Braces: N/A  Respiratory Status: Room air     Functional Mobility:  · Transfers:     · Sit to Stand:  modified independence with rolling walker  · Gait: 250ft with RW, mod I  · Stairs:  Pt ascended/descended 4" curb step with Rolling Walker with no handrails with Modified Independent.  2 " trials.      Therapeutic Activities and Exercises:   PT last visit performed, answered all last questions/concerns as appropriate.     Patient left up in chair with all lines intact, call button in reach and NSG notified..    GOALS:   Multidisciplinary Problems     Physical Therapy Goals     Not on file          Multidisciplinary Problems (Resolved)        Problem: Physical Therapy    Goal Priority Disciplines Outcome Goal Variances Interventions   Physical Therapy Goal   (Resolved)     PT, PT/OT Met     Description: Pt will improve functional independence by performing:    Bed mobility: mod I  MET 8/17/22 Sit to stand: mod I  MET 8/17/22 Ambulation x 200' with SBA with rolling walker  MET 08/16/2022 1 Step (Curb): SBA with rolling walker  MET 8/17/22 3 Steps: SBA with B HR  Independent with total hip HEP                     Time Tracking:     PT Received On:    PT Start Time: 1020     PT Stop Time: 1043  PT Total Time (min): 23 min     Billable Minutes: Therapeutic Activity 23 min     This Note to also serve as DC Summary Note. Patient has met all goals and is ready for DC from hospital to home with OP therapy.     Treatment Type: Treatment  PT/PTA: PT           08/18/2022

## 2022-08-18 NOTE — PROGRESS NOTES
"No acute events overnight.  Pain controlled.  Resting in bed.     Vital Signs  Temp: 97.8 °F (36.6 °C)  Temp src: Oral  Pulse: 80  Heart Rate Source: Monitor  Resp: 18  SpO2: 95 %  Pulse Oximetry Type: Intermittent  Oxygen Concentration (%): 78  O2 Device (Oxygen Therapy): room air  BP: 105/67  BP Location: Left arm  BP Method: Automatic  Patient Position: Lying  Height and Weight  Height: 5' 1" (154.9 cm)  Height Method: Stated  Weight: 95.3 kg (210 lb)  Weight Method: Stated  BSA (Calculated - sq m): 2.02 sq meters  BMI (Calculated): 39.7  Weight in (lb) to have BMI = 25: 132]    +FHL/EHL  BCR distally  Dressing c/d/i  SILT distally    Recent Lab Results     None          A/P:  Status post revision LAVERNE  Pain controlled  Overall patient doing well.  Therapy for mobility and ambulation.  DVT PPx  Home later today  "

## 2022-08-18 NOTE — PLAN OF CARE
Problem: Physical Therapy  Goal: Physical Therapy Goal  Description: Pt will improve functional independence by performing:    Bed mobility: mod I  MET 8/17/22 Sit to stand: mod I  MET 8/17/22 Ambulation x 200' with SBA with rolling walker  MET 08/16/2022 1 Step (Curb): SBA with rolling walker  MET 8/17/22 3 Steps: SBA with B HR  Independent with total hip HEP    Outcome: Met

## 2022-08-18 NOTE — PLAN OF CARE
Problem: Adult Inpatient Plan of Care  Goal: Plan of Care Review  Outcome: Met  Goal: Patient-Specific Goal (Individualized)  Outcome: Met  Goal: Absence of Hospital-Acquired Illness or Injury  Outcome: Met  Goal: Optimal Comfort and Wellbeing  Outcome: Met  Goal: Readiness for Transition of Care  Outcome: Met     Problem: Infection  Goal: Absence of Infection Signs and Symptoms  Outcome: Met     Problem: Fall Injury Risk  Goal: Absence of Fall and Fall-Related Injury  Outcome: Met     Problem: Pain Acute  Goal: Acceptable Pain Control and Functional Ability  Outcome: Met     Problem: Adjustment to Surgery (Hip Arthroplasty)  Goal: Optimal Coping  Outcome: Met     Problem: Bleeding (Hip Arthroplasty)  Goal: Absence of Bleeding  Outcome: Met     Problem: Bowel Motility Impaired (Hip Arthroplasty)  Goal: Effective Bowel Elimination  Outcome: Met     Problem: Fluid and Electrolyte Imbalance (Hip Arthroplasty)  Goal: Fluid and Electrolyte Balance  Outcome: Met     Problem: Functional Ability Impaired (Hip Arthroplasty)  Goal: Optimal Functional Ability  Outcome: Met     Problem: Infection (Hip Arthroplasty)  Goal: Absence of Infection Signs and Symptoms  Outcome: Met     Problem: Neurovascular Compromise (Hip Arthroplasty)  Goal: Intact Neurovascular Status  Outcome: Met     Problem: Ongoing Anesthesia Effects (Hip Arthroplasty)  Goal: Anesthesia/Sedation Recovery  Outcome: Met     Problem: Pain (Hip Arthroplasty)  Goal: Acceptable Pain Control  Outcome: Met     Problem: Postoperative Nausea and Vomiting (Hip Arthroplasty)  Goal: Nausea and Vomiting Relief  Outcome: Met     Problem: Postoperative Urinary Retention (Hip Arthroplasty)  Goal: Effective Urinary Elimination  Outcome: Met     Problem: Respiratory Compromise (Hip Arthroplasty)  Goal: Effective Oxygenation and Ventilation  Outcome: Met

## 2022-08-19 ENCOUNTER — PATIENT OUTREACH (OUTPATIENT)
Dept: ADMINISTRATIVE | Facility: CLINIC | Age: 42
End: 2022-08-19
Payer: COMMERCIAL

## 2022-08-19 NOTE — PROGRESS NOTES
C3 nurse spoke with Bernadette Lopez   for a TCC post hospital discharge follow up call. The patient has a scheduled HOSFU appointment with Bull Cabrera, DO  Will see ortho Dr. Peña on 08/30/2022 @ 330PM.

## 2022-08-20 LAB — BACTERIA SPEC CULT: NORMAL

## 2022-08-24 DIAGNOSIS — Z47.1 AFTERCARE FOLLOWING JOINT REPLACEMENT SURGERY, UNSPECIFIED JOINT: Primary | ICD-10-CM

## 2022-08-25 RX ORDER — OXYCODONE AND ACETAMINOPHEN 7.5; 325 MG/1; MG/1
1 TABLET ORAL EVERY 6 HOURS PRN
Qty: 28 TABLET | Refills: 0 | Status: SHIPPED | OUTPATIENT
Start: 2022-08-25 | End: 2022-09-01

## 2022-08-25 NOTE — PHYSICIAN QUERY
PT Name: Bernadette Lopez  MR #: [id number]    DOCUMENTATION CLARIFICATION     CDS/: JACKIE Palma, RN               Contact information:[email or web address]  This form is a permanent document in the medical record.     Query Date: August 25, 2022    By submitting this query, we are merely seeking further clarification of documentation.  Please utilize your independent clinical judgment when addressing the question(s) below.    The medical record contains the following:  Pathology Findings Location in Medical Record   GROSS AND MICROSCOPIC DIAGNOSIS:  A.  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       -No increased acute inflammation identified (less than        5 neutrophils per high power field).    B.  SURGICAL HARDWARE, LEFT HIP, EXPLANT:        -Surgical hardware identified (gross examination only).         Separately received fragment of fibrous tissue with             chronic reactive synovitis and calcification.         -Organizing fibrin thrombus. Surgical Pathology Results 08/18/2022       Please clarify:  [  ] Pathology findings noted above are ruled in/confirmed as diagnoses   [  ] Pathology findings noted above are not confirmed as diagnoses   [ x ] Pathology findings noted above are incidental   [  ] Other diagnosis (please specify): ___________   [  ] Clinically Undetermined       Please document in your progress notes daily for the duration of treatment until resolved and include in your discharge summary.    Form No. [id number]

## 2022-08-26 NOTE — DISCHARGE SUMMARY
[facility] - Orthopaedics  Discharge Summary      Admit Date: 8/15/2022    Discharge Date and Time: 8/18/2022 12:25 PM    Attending Physician: Angelika att. providers found     Reason for Admission: failed total hip arthroplasty; left side    Procedures Performed: Procedure(s) (LRB):  REVISION, TOTAL ARTHROPLASTY, HIP femur / cell saver / chetan / rest mod vs insignia / pathology (Left)    Hospital Course Patient seen in clinic with complaints of left hip pain, status post left total hip arthroplasty. Tried and failed all conservative measures including activity modification, anti-inflammatories, and physical therapy. Patient felt as though they have reached a point of disability with regards to left hip pain. Patient was worked up for infection and found to be negative. Risk, benefits, and alternatives discussed for revision of left total hip arthroplasty. Despite these risks, the patient wished to proceed with surgical intervention.    Patient admitted as an inpatient. Seen preoperatively by anesthesia and cleared for surgery. Patient was then taken to the OR and a revision of left total hip arthroplasty was performed. For full details please see dictated operative note. Patient tolerated the procedure without any issues and was transferred to the floor postoperatively. Physical therapy began working with the patient on postop day 1 and patient was made weightbearing as tolerated to affected extremity. Patient progressed well with physical therapy and eventually cleared all physical therapy guidelines. Patient's pain and vitals remained stable throughout hospitalization. Wound was checked and found to be clean, dry, and intact. At this point the patient was deemed suitable to discharge home.       Goals of Care Treatment Preferences:  Code Status: Full Code      Consults: PT    Significant Diagnostic Studies:   Specimen (24h ago, onward)             Start     Ordered    --  CYTOLOGY FINAL REPORT          08/15/22 0000                Final Diagnoses:    Principal Problem: Chronic hip pain after total replacement of hip joint   Secondary Diagnoses:   Active Hospital Problems    Diagnosis  POA    *Chronic hip pain after total replacement of hip joint [M25.559, G89.29, Z96.649]  Not Applicable      Resolved Hospital Problems   No resolved problems to display.       Discharged Condition: good    Disposition: Home or Self Care    Follow Up/Patient Instructions:     Medications:  Reconciled Home Medications:      Medication List      START taking these medications    aspirin 81 MG EC tablet  Commonly known as: ECOTRIN  Take 1 tablet (81 mg total) by mouth every 12 (twelve) hours.     methocarbamoL 750 MG Tab  Commonly known as: ROBAXIN  Take 1 tablet (750 mg total) by mouth every 6 (six) hours. for 14 days        CONTINUE taking these medications    albuterol 2.5 mg /3 mL (0.083 %) nebulizer solution  Commonly known as: PROVENTIL  Inhale 1 mL into the lungs daily as needed.     ALBUTEROL INHL  Inhale 1 puff into the lungs daily as needed.     ascorbic acid (vitamin C) 500 MG tablet  Commonly known as: VITAMIN C  Take 500 mg by mouth.     b complex vitamins capsule  Take 1 capsule by mouth once daily.     BREO ELLIPTA 100-25 mcg/dose diskus inhaler  Generic drug: fluticasone furoate-vilanteroL  Inhale 1 puff into the lungs daily as needed.     calcium carbonate 600 mg calcium (1,500 mg) Tab  Commonly known as: OS-LOGAN     clonazePAM 0.5 MG tablet  Commonly known as: KlonoPIN  Take 0.5 mg by mouth 2 (two) times daily as needed.     collagenase clos hist. (bulk) Powd  1 Scoop by Other route daily as needed.     FLOVENT  mcg/actuation inhaler  Generic drug: fluticasone propionate  Inhale 2 puffs into the lungs daily as needed.     gabapentin 300 MG capsule  Commonly known as: NEURONTIN  Take 1 capsule (300 mg total) by mouth 2 (two) times daily.     levothyroxine 88 MCG tablet  Commonly known as: SYNTHROID  Take 1  tablet by mouth every morning.     multivitamin with minerals tablet  Take 1 tablet by mouth once daily at 6am.     nitrofurantoin (macrocrystal-monohydrate) 100 MG capsule  Commonly known as: MACROBID  Take 100 mg by mouth 2 (two) times daily.        STOP taking these medications    meloxicam 15 MG tablet  Commonly known as: MOBIC     traMADoL 50 mg tablet  Commonly known as: ULTRAM        ASK your doctor about these medications    ketorolac 10 mg tablet  Commonly known as: TORADOL  Take 1 tablet (10 mg total) by mouth every 8 (eight) hours. for 5 days  Ask about: Should I take this medication?          No discharge procedures on file.   Follow-up Information     Michel Peña MD. Go on 8/30/2022.    Specialty: Orthopedic Surgery  Why: Ortho follow up appt on Tuesday 8/30/22 @ 3:30pm.  Contact information:  [address]  [address]  [place] [address]  [phone]             [facility] Follow up.    Why: This is the outpatient therapy facility.They will contact pt with appt date & time. Call if you have questions or concerns.  Contact information:  [address]  [facility]  [place] [address]  [phone]

## 2022-08-30 ENCOUNTER — OFFICE VISIT (OUTPATIENT)
Dept: ORTHOPEDICS | Facility: CLINIC | Age: 42
End: 2022-08-30
Payer: COMMERCIAL

## 2022-08-30 ENCOUNTER — HOSPITAL ENCOUNTER (OUTPATIENT)
Dept: RADIOLOGY | Facility: CLINIC | Age: 42
Discharge: HOME | End: 2022-08-30
Attending: ORTHOPAEDIC SURGERY
Payer: COMMERCIAL

## 2022-08-30 VITALS
DIASTOLIC BLOOD PRESSURE: 52 MMHG | HEART RATE: 80 BPM | WEIGHT: 210 LBS | SYSTOLIC BLOOD PRESSURE: 83 MMHG | HEIGHT: 61 IN | BODY MASS INDEX: 39.65 KG/M2

## 2022-08-30 DIAGNOSIS — Z96.642 AFTERCARE FOLLOWING LEFT HIP JOINT REPLACEMENT SURGERY: ICD-10-CM

## 2022-08-30 DIAGNOSIS — Z09 FOLLOW-UP EXAM: ICD-10-CM

## 2022-08-30 DIAGNOSIS — Z09 FOLLOW-UP EXAM: Primary | ICD-10-CM

## 2022-08-30 DIAGNOSIS — Z47.1 AFTERCARE FOLLOWING LEFT HIP JOINT REPLACEMENT SURGERY: ICD-10-CM

## 2022-08-30 PROCEDURE — 3078F DIAST BP <80 MM HG: CPT | Mod: CPTII,,, | Performed by: ORTHOPAEDIC SURGERY

## 2022-08-30 PROCEDURE — 73502 XR HIP WITH PELVIS WHEN PERFORMED, 2 OR 3 VIEWS LEFT: ICD-10-PCS | Mod: LT,,, | Performed by: ORTHOPAEDIC SURGERY

## 2022-08-30 PROCEDURE — 1160F PR REVIEW ALL MEDS BY PRESCRIBER/CLIN PHARMACIST DOCUMENTED: ICD-10-PCS | Mod: CPTII,,, | Performed by: ORTHOPAEDIC SURGERY

## 2022-08-30 PROCEDURE — 73502 X-RAY EXAM HIP UNI 2-3 VIEWS: CPT | Mod: LT,,, | Performed by: ORTHOPAEDIC SURGERY

## 2022-08-30 PROCEDURE — 1160F RVW MEDS BY RX/DR IN RCRD: CPT | Mod: CPTII,,, | Performed by: ORTHOPAEDIC SURGERY

## 2022-08-30 PROCEDURE — 99024 PR POST-OP FOLLOW-UP VISIT: ICD-10-PCS | Mod: ,,, | Performed by: ORTHOPAEDIC SURGERY

## 2022-08-30 PROCEDURE — 3078F PR MOST RECENT DIASTOLIC BLOOD PRESSURE < 80 MM HG: ICD-10-PCS | Mod: CPTII,,, | Performed by: ORTHOPAEDIC SURGERY

## 2022-08-30 PROCEDURE — 1159F MED LIST DOCD IN RCRD: CPT | Mod: CPTII,,, | Performed by: ORTHOPAEDIC SURGERY

## 2022-08-30 PROCEDURE — 3008F PR BODY MASS INDEX (BMI) DOCUMENTED: ICD-10-PCS | Mod: CPTII,,, | Performed by: ORTHOPAEDIC SURGERY

## 2022-08-30 PROCEDURE — 3008F BODY MASS INDEX DOCD: CPT | Mod: CPTII,,, | Performed by: ORTHOPAEDIC SURGERY

## 2022-08-30 PROCEDURE — 1159F PR MEDICATION LIST DOCUMENTED IN MEDICAL RECORD: ICD-10-PCS | Mod: CPTII,,, | Performed by: ORTHOPAEDIC SURGERY

## 2022-08-30 PROCEDURE — 99024 POSTOP FOLLOW-UP VISIT: CPT | Mod: ,,, | Performed by: ORTHOPAEDIC SURGERY

## 2022-08-30 PROCEDURE — 3074F PR MOST RECENT SYSTOLIC BLOOD PRESSURE < 130 MM HG: ICD-10-PCS | Mod: CPTII,,, | Performed by: ORTHOPAEDIC SURGERY

## 2022-08-30 PROCEDURE — 3074F SYST BP LT 130 MM HG: CPT | Mod: CPTII,,, | Performed by: ORTHOPAEDIC SURGERY

## 2022-08-30 RX ORDER — BIOTIN 10 MG
TABLET ORAL
COMMUNITY

## 2022-08-30 RX ORDER — BISACODYL 5 MG/1
TABLET, COATED ORAL
COMMUNITY
End: 2023-09-11

## 2022-08-30 RX ORDER — ACETAMINOPHEN 500 MG
TABLET ORAL
COMMUNITY
Start: 2021-10-06

## 2022-08-30 RX ORDER — MULTIVIT WITH MINERALS/HERBS
TABLET ORAL
COMMUNITY

## 2022-08-30 RX ORDER — HYDROCODONE BITARTRATE AND ACETAMINOPHEN 10; 325 MG/1; MG/1
1 TABLET ORAL EVERY 6 HOURS PRN
Qty: 28 TABLET | Refills: 0 | Status: SHIPPED | OUTPATIENT
Start: 2022-08-30 | End: 2022-09-06 | Stop reason: SDUPTHER

## 2022-08-30 RX ORDER — METHOCARBAMOL 750 MG/1
750 TABLET, FILM COATED ORAL 3 TIMES DAILY
Qty: 42 TABLET | Refills: 0 | Status: SHIPPED | OUTPATIENT
Start: 2022-08-30 | End: 2022-09-08 | Stop reason: SDUPTHER

## 2022-08-30 NOTE — PROGRESS NOTES
Past Medical History:   Diagnosis Date    ARDS (adult respiratory distress syndrome)     Arthritis     Thyroid disease        Past Surgical History:   Procedure Laterality Date    ARTHROCENTESIS OF HIP JOINT Left 2022    Procedure: ARTHROCENTESIS, HIP aspiration under fluroscopy;  Surgeon: Michel Peña MD;  Location: [facility];  Service: Orthopedics;  Laterality: Left;     SECTION      x 2    INSERTION OF PERCUTANEOUS ENDOSCOPIC GASTROSTOMY (PEG) FEEDING TUBE      KNEE ARTHROSCOPY Right     LAPAROSCOPIC CHOLECYSTECTOMY      ORIF HIP FRACTURE Left     X2    PEG TUBE REMOVAL      REMOVAL OF TRACHEOSTOMY TUBE      REVISION TOTAL HIP ARTHROPLASTY Left 8/15/2022    Procedure: REVISION, TOTAL ARTHROPLASTY, HIP femur / cell saver / chetan / rest mod vs insignia / pathology;  Surgeon: Michel Peña MD;  Location: [facility] OR;  Service: Orthopedics;  Laterality: Left;    SINUS SURGERY      x2    TOTAL HIP ARTHROPLASTY Left     TRACHEOTOMY         Current Outpatient Medications   Medication Sig    albuterol (PROVENTIL) 2.5 mg /3 mL (0.083 %) nebulizer solution Inhale 1 mL into the lungs daily as needed.    aspirin (ECOTRIN) 81 MG EC tablet Take 1 tablet (81 mg total) by mouth every 12 (twelve) hours.    b complex vitamins tablet vitamin B complex    biotin 10 mg Tab biotin    calcium carbonate (OS-LOGAN) 600 mg calcium (1,500 mg) Tab     cholecalciferol, vitamin D3, 125 mcg (5,000 unit) Tab Take by mouth.    clonazePAM (KLONOPIN) 0.5 MG tablet Take 0.5 mg by mouth 2 (two) times daily as needed.    fluticasone furoate-vilanteroL (BREO ELLIPTA) 100-25 mcg/dose diskus inhaler Inhale 1 puff into the lungs daily as needed.    levothyroxine (SYNTHROID) 88 MCG tablet Take 1 tablet by mouth every morning.    methocarbamoL (ROBAXIN) 750 MG Tab Take 1 tablet (750 mg total) by mouth every 6 (six) hours. for 14 days    multivitamin with minerals tablet Take 1 tablet by mouth once daily at 6am.    oxyCODONE-acetaminophen  (PERCOCET) 7.5-325 mg per tablet Take 1 tablet by mouth every 6 (six) hours as needed for Pain.    ALBUTEROL INHL Inhale 1 puff into the lungs daily as needed.    ascorbic acid, vitamin C, (VITAMIN C) 500 MG tablet Take 500 mg by mouth.    b complex vitamins capsule Take 1 capsule by mouth once daily.    collagenase Clostridium hist. (COLLAGENASE CLOS HIST., BULK,) Powd 1 Scoop by Other route daily as needed.    fluticasone propionate (FLOVENT HFA) 110 mcg/actuation inhaler Inhale 2 puffs into the lungs daily as needed.    gabapentin (NEURONTIN) 300 MG capsule Take 1 capsule (300 mg total) by mouth 2 (two) times daily. (Patient not taking: Reported on 8/19/2022)    multivitamin-minerals-lutein (MULTIVITAMIN 50 PLUS) Tab multivitamin    nitrofurantoin, macrocrystal-monohydrate, (MACROBID) 100 MG capsule Take 100 mg by mouth 2 (two) times daily.     No current facility-administered medications for this visit.       Review of patient's allergies indicates:   Allergen Reactions    Tizanidine Rash       Family History   Family history unknown: Yes       Social History     Socioeconomic History    Marital status: Single   Tobacco Use    Smoking status: Former     Types: Cigarettes    Smokeless tobacco: Never   Substance and Sexual Activity    Alcohol use: Yes     Alcohol/week: 1.0 standard drink     Types: 1 Glasses of wine per week     Comment: occas    Drug use: Never    Sexual activity: Yes     Social Determinants of Health     Financial Resource Strain: Low Risk     Difficulty of Paying Living Expenses: Not hard at all   Food Insecurity: No Food Insecurity    Worried About Running Out of Food in the Last Year: Never true    Ran Out of Food in the Last Year: Never true   Transportation Needs: No Transportation Needs    Lack of Transportation (Medical): No    Lack of Transportation (Non-Medical): No   Physical Activity: Inactive    Days of Exercise per Week: 0 days    Minutes of Exercise per Session: 0 min   Stress: No  Stress Concern Present    Feeling of Stress : Only a little   Social Connections: Unknown    Frequency of Communication with Friends and Family: More than three times a week    Frequency of Social Gatherings with Friends and Family: Patient refused    Active Member of Clubs or Organizations: No    Attends Club or Organization Meetings: Never    Marital Status: Living with partner   Housing Stability: Low Risk     Unable to Pay for Housing in the Last Year: No    Number of Places Lived in the Last Year: 1    Unstable Housing in the Last Year: No       Chief Complaint:   Chief Complaint   Patient presents with    Post-op Evaluation     post op sx 8/15/22, L total hip revision, pt states pain is better, only experiencing surgical pain, PT 2xs a week, does home exercises,        History of present illness: Bernadette Lopez is a 42 y.o. female, presents to clinic today 2 weeks status post revision of left total hip arthroplasty.  Overall the patient is doing well, states that she is feeling fantastic.  Ambulating with a walker.  Currently in physical therapy for strengthening, range of motion, mobility.  Denies any significant pain to the hip.  Does states she has some incisional pain.  Currently on Robaxin and Percocet.  Would like to down Norco use.  Regards incision site she has had no drainage or bleeding or signs infection.      Review of Systems:    Denies fevers, chills, chest pain, shortness of breath. Comprehensive review of systems performed and otherwise negative except as noted in HPI      Physical Examination:    General: awake and alert, no acute distress, healthy appearing  Head and Neck: Head atraumatic/normocephalic. Moist MM  CV: brisk cap refill  Lungs: non-labored breathing, w/o cough or SOB  Skin: no rashes present, warm to touch  Neuro: sensation grossly intact distall     Vital Signs:    Vitals:    08/30/22 1534   BP: (!) 83/52   Pulse: 80       Body mass index is 39.68 kg/m².    Left hip  exam:    Left hip incision clean dry and intact. No erythema, drainage, or signs of infection  No swelling distally. No signs of DVT  Brisk cap refill left foot  Sensation intact distally to left foot  Positive FHL/EHL/gastrocsoleus/tib ant    X-rays: 3 views of the left hip reviewed. Patient's implants appear well fixed. No signs of loosening or subsidence noted.     Assessment:: post op s/p revision of L LAVERNE    Plan:  Patient presents to clinic today 2 weeks status post revision of left total hip arthroplasty.  Overall the patient has done well.  Staples were discontinued today here in clinic incision site is well healed.  Patient was educated she cannot shower without vigorously scrubbed incision site.  She is to continue physical therapy for strengthening, range of motion, and mobility.  will refill her pain medication and muscle relaxants today here in clinic. Follow-up in 1 month for repeat x-rays and evaluation. All questions and concerns were addressed. The patient understands and agrees with the plan of care.    The above findings, diagnostics, and treatment plan were discussed with Dr. Michel Peña who is in agreement with the plan of care.    This note was created using [employer] voice recognition software that occasionally misinterpreted phrases or words.    Consult note is delivered via Epic messaging service.

## 2022-09-06 DIAGNOSIS — Z47.1 AFTERCARE FOLLOWING LEFT HIP JOINT REPLACEMENT SURGERY: ICD-10-CM

## 2022-09-06 DIAGNOSIS — Z96.642 AFTERCARE FOLLOWING LEFT HIP JOINT REPLACEMENT SURGERY: ICD-10-CM

## 2022-09-06 RX ORDER — HYDROCODONE BITARTRATE AND ACETAMINOPHEN 10; 325 MG/1; MG/1
1 TABLET ORAL EVERY 6 HOURS PRN
Qty: 28 TABLET | Refills: 0 | Status: SHIPPED | OUTPATIENT
Start: 2022-09-06 | End: 2022-09-18 | Stop reason: SDUPTHER

## 2022-09-18 DIAGNOSIS — Z96.642 AFTERCARE FOLLOWING LEFT HIP JOINT REPLACEMENT SURGERY: ICD-10-CM

## 2022-09-18 DIAGNOSIS — Z47.1 AFTERCARE FOLLOWING LEFT HIP JOINT REPLACEMENT SURGERY: ICD-10-CM

## 2022-09-19 RX ORDER — HYDROCODONE BITARTRATE AND ACETAMINOPHEN 10; 325 MG/1; MG/1
1 TABLET ORAL EVERY 6 HOURS PRN
Qty: 28 TABLET | Refills: 0 | Status: SHIPPED | OUTPATIENT
Start: 2022-09-19 | End: 2022-09-26

## 2022-09-20 LAB — FUNGUS SPEC CULT: NORMAL

## 2022-09-27 ENCOUNTER — HOSPITAL ENCOUNTER (OUTPATIENT)
Dept: RADIOLOGY | Facility: CLINIC | Age: 42
Discharge: HOME | End: 2022-09-27
Attending: ORTHOPAEDIC SURGERY
Payer: COMMERCIAL

## 2022-09-27 ENCOUNTER — OFFICE VISIT (OUTPATIENT)
Dept: ORTHOPEDICS | Facility: CLINIC | Age: 42
End: 2022-09-27
Payer: COMMERCIAL

## 2022-09-27 VITALS
DIASTOLIC BLOOD PRESSURE: 63 MMHG | SYSTOLIC BLOOD PRESSURE: 95 MMHG | HEART RATE: 88 BPM | HEIGHT: 61 IN | WEIGHT: 210 LBS | BODY MASS INDEX: 39.65 KG/M2

## 2022-09-27 DIAGNOSIS — Z47.1 AFTERCARE FOLLOWING LEFT HIP JOINT REPLACEMENT SURGERY: ICD-10-CM

## 2022-09-27 DIAGNOSIS — Z96.649 HISTORY OF REVISION OF TOTAL HIP ARTHROPLASTY: Primary | ICD-10-CM

## 2022-09-27 DIAGNOSIS — Z96.642 AFTERCARE FOLLOWING LEFT HIP JOINT REPLACEMENT SURGERY: ICD-10-CM

## 2022-09-27 DIAGNOSIS — Z96.649 HISTORY OF REVISION OF TOTAL HIP ARTHROPLASTY: ICD-10-CM

## 2022-09-27 PROCEDURE — 1159F PR MEDICATION LIST DOCUMENTED IN MEDICAL RECORD: ICD-10-PCS | Mod: CPTII,,, | Performed by: ORTHOPAEDIC SURGERY

## 2022-09-27 PROCEDURE — 73502 X-RAY EXAM HIP UNI 2-3 VIEWS: CPT | Mod: LT,,, | Performed by: ORTHOPAEDIC SURGERY

## 2022-09-27 PROCEDURE — 1159F MED LIST DOCD IN RCRD: CPT | Mod: CPTII,,, | Performed by: ORTHOPAEDIC SURGERY

## 2022-09-27 PROCEDURE — 3008F BODY MASS INDEX DOCD: CPT | Mod: CPTII,,, | Performed by: ORTHOPAEDIC SURGERY

## 2022-09-27 PROCEDURE — 3008F PR BODY MASS INDEX (BMI) DOCUMENTED: ICD-10-PCS | Mod: CPTII,,, | Performed by: ORTHOPAEDIC SURGERY

## 2022-09-27 PROCEDURE — 3074F SYST BP LT 130 MM HG: CPT | Mod: CPTII,,, | Performed by: ORTHOPAEDIC SURGERY

## 2022-09-27 PROCEDURE — 99024 POSTOP FOLLOW-UP VISIT: CPT | Mod: ,,, | Performed by: ORTHOPAEDIC SURGERY

## 2022-09-27 PROCEDURE — 3074F PR MOST RECENT SYSTOLIC BLOOD PRESSURE < 130 MM HG: ICD-10-PCS | Mod: CPTII,,, | Performed by: ORTHOPAEDIC SURGERY

## 2022-09-27 PROCEDURE — 73502 XR HIP WITH PELVIS WHEN PERFORMED, 2 OR 3 VIEWS LEFT: ICD-10-PCS | Mod: LT,,, | Performed by: ORTHOPAEDIC SURGERY

## 2022-09-27 PROCEDURE — 99024 PR POST-OP FOLLOW-UP VISIT: ICD-10-PCS | Mod: ,,, | Performed by: ORTHOPAEDIC SURGERY

## 2022-09-27 PROCEDURE — 3078F PR MOST RECENT DIASTOLIC BLOOD PRESSURE < 80 MM HG: ICD-10-PCS | Mod: CPTII,,, | Performed by: ORTHOPAEDIC SURGERY

## 2022-09-27 PROCEDURE — 3078F DIAST BP <80 MM HG: CPT | Mod: CPTII,,, | Performed by: ORTHOPAEDIC SURGERY

## 2022-09-27 RX ORDER — MELOXICAM 15 MG/1
15 TABLET ORAL DAILY
Qty: 30 TABLET | Refills: 2 | Status: SHIPPED | OUTPATIENT
Start: 2022-09-27 | End: 2024-03-14

## 2022-09-27 RX ORDER — HYDROCODONE BITARTRATE AND ACETAMINOPHEN 7.5; 325 MG/1; MG/1
1 TABLET ORAL EVERY 6 HOURS PRN
Qty: 28 TABLET | Refills: 0 | Status: SHIPPED | OUTPATIENT
Start: 2022-09-27 | End: 2022-10-05 | Stop reason: SDUPTHER

## 2022-09-27 RX ORDER — METHOCARBAMOL 750 MG/1
750 TABLET, FILM COATED ORAL 3 TIMES DAILY
Qty: 42 TABLET | Refills: 0 | Status: SHIPPED | OUTPATIENT
Start: 2022-09-27 | End: 2022-10-11

## 2022-09-27 NOTE — PROGRESS NOTES
Past Medical History:   Diagnosis Date    ARDS (adult respiratory distress syndrome)     Arthritis     Thyroid disease        Past Surgical History:   Procedure Laterality Date    ARTHROCENTESIS OF HIP JOINT Left 2022    Procedure: ARTHROCENTESIS, HIP aspiration under fluroscopy;  Surgeon: Michel Peña MD;  Location: [facility];  Service: Orthopedics;  Laterality: Left;     SECTION      x 2    INSERTION OF PERCUTANEOUS ENDOSCOPIC GASTROSTOMY (PEG) FEEDING TUBE      KNEE ARTHROSCOPY Right     LAPAROSCOPIC CHOLECYSTECTOMY      ORIF HIP FRACTURE Left     X2    PEG TUBE REMOVAL      REMOVAL OF TRACHEOSTOMY TUBE      REVISION TOTAL HIP ARTHROPLASTY Left 8/15/2022    Procedure: REVISION, TOTAL ARTHROPLASTY, HIP femur / cell saver / chetan / rest mod vs insignia / pathology;  Surgeon: Michel Peña MD;  Location: [facility] OR;  Service: Orthopedics;  Laterality: Left;    SINUS SURGERY      x2    TOTAL HIP ARTHROPLASTY Left     TRACHEOTOMY         Current Outpatient Medications   Medication Sig    albuterol (PROVENTIL) 2.5 mg /3 mL (0.083 %) nebulizer solution Inhale 1 mL into the lungs daily as needed.    ALBUTEROL INHL Inhale 1 puff into the lungs daily as needed.    ascorbic acid, vitamin C, (VITAMIN C) 500 MG tablet Take 500 mg by mouth.    aspirin (ECOTRIN) 81 MG EC tablet Take 1 tablet (81 mg total) by mouth every 12 (twelve) hours.    b complex vitamins capsule Take 1 capsule by mouth once daily.    b complex vitamins tablet vitamin B complex    biotin 10 mg Tab biotin    calcium carbonate (OS-LOGAN) 600 mg calcium (1,500 mg) Tab     cholecalciferol, vitamin D3, 125 mcg (5,000 unit) Tab Take by mouth.    clonazePAM (KLONOPIN) 0.5 MG tablet Take 0.5 mg by mouth 2 (two) times daily as needed.    collagenase Clostridium hist. (COLLAGENASE CLOS HIST., BULK,) Powd 1 Scoop by Other route daily as needed.    fluticasone furoate-vilanteroL (BREO ELLIPTA) 100-25 mcg/dose diskus inhaler Inhale 1 puff into the lungs  daily as needed.    fluticasone propionate (FLOVENT HFA) 110 mcg/actuation inhaler Inhale 2 puffs into the lungs daily as needed.    gabapentin (NEURONTIN) 300 MG capsule Take 1 capsule (300 mg total) by mouth 2 (two) times daily. (Patient not taking: Reported on 8/19/2022)    HYDROcodone-acetaminophen (NORCO) 7.5-325 mg per tablet Take 1 tablet by mouth every 6 (six) hours as needed for Pain.    levothyroxine (SYNTHROID) 88 MCG tablet Take 1 tablet by mouth every morning.    meloxicam (MOBIC) 15 MG tablet Take 1 tablet (15 mg total) by mouth once daily.    methocarbamoL (ROBAXIN) 750 MG Tab Take 1 tablet (750 mg total) by mouth 3 (three) times daily. for 14 days    multivitamin with minerals tablet Take 1 tablet by mouth once daily at 6am.    multivitamin-minerals-lutein (MULTIVITAMIN 50 PLUS) Tab multivitamin    nitrofurantoin, macrocrystal-monohydrate, (MACROBID) 100 MG capsule Take 100 mg by mouth 2 (two) times daily.     No current facility-administered medications for this visit.       Review of patient's allergies indicates:   Allergen Reactions    Tizanidine Rash       Family History   Family history unknown: Yes       Social History     Socioeconomic History    Marital status: Single   Tobacco Use    Smoking status: Former     Packs/day: 0.50     Years: 24.00     Pack years: 12.00     Types: Cigarettes    Smokeless tobacco: Never    Tobacco comments:     Pt quit smoking about year in half the patient smoked for total of of 24 years. Pt used to smoke about .5PPD.   Substance and Sexual Activity    Alcohol use: Yes     Alcohol/week: 1.0 standard drink     Types: 1 Glasses of wine per week     Comment: occas    Drug use: Never    Sexual activity: Yes     Social Determinants of Health     Financial Resource Strain: Low Risk     Difficulty of Paying Living Expenses: Not hard at all   Food Insecurity: No Food Insecurity    Worried About Running Out of Food in the Last Year: Never true    Ran Out of Food in the  Last Year: Never true   Transportation Needs: No Transportation Needs    Lack of Transportation (Medical): No    Lack of Transportation (Non-Medical): No   Physical Activity: Inactive    Days of Exercise per Week: 0 days    Minutes of Exercise per Session: 0 min   Stress: No Stress Concern Present    Feeling of Stress : Only a little   Social Connections: Unknown    Frequency of Communication with Friends and Family: More than three times a week    Frequency of Social Gatherings with Friends and Family: Patient refused    Active Member of Clubs or Organizations: No    Attends Club or Organization Meetings: Never    Marital Status: Living with partner   Housing Stability: Low Risk     Unable to Pay for Housing in the Last Year: No    Number of Places Lived in the Last Year: 1    Unstable Housing in the Last Year: No       Chief Complaint:   Chief Complaint   Patient presents with    Left Hip - Follow-up     Follow up for total left hip sx on 8/15/22. Using cane. Hip is doing good. A couple PT exercises is bothering her every now and then. A little muscle tightness near groin. For the most part, everything is going good.        History of present illness: Bernadette Lopez is a 42 y.o. female, presents to the clinic today 6 weeks status post revision of left total hip arthroplasty.  Overall patient is doing well.  Ambulating with a cane.  Does have some complaints of incisional pain in the anterior groin pain only during certain exercises.  These to be more muscular in nature.  Did state that she is starting to use her 10s unit.  Denies using any anti-inflammatories at this point.  Currently in physical therapy 3 times a week for strengthening, range of motion, and mobility.      Review of Systems:    Denies fevers, chills, chest pain, shortness of breath. Comprehensive review of systems performed and otherwise negative except as noted in HPI      Physical Examination:    General: awake and alert, no acute distress,  healthy appearing  Head and Neck: Head atraumatic/normocephalic. Moist MM  CV: brisk cap refill  Lungs: non-labored breathing, w/o cough or SOB  Skin: no rashes present, warm to touch  Neuro: sensation grossly intact distall     Vital Signs:    Vitals:    09/27/22 1517   BP: 95/63   Pulse: 88       Body mass index is 39.68 kg/m².    Left hip exam:    Left hip incision clean dry and intact. No erythema, drainage, or signs of infection  No swelling distally. No signs of DVT  Brisk cap refill left foot  Sensation intact distally to left foot  Positive FHL/EHL/gastrocsoleus/tib ant    X-rays: 3 views of the left hip reviewed. Patient's implants appear well fixed. No signs of loosening or subsidence noted.     Assessment:: post op s/p L LAVERNE    Plan:  Patient presents to clinic today 6 weeks status post revision of left total hip arthroplasty.  Her hip is stable on examination x-rays today in clinic.  In regards to the pain that she does describe seems to be more muscular in nature.  Will treat this with some Mobic to help calm this down.  Also encouraged to continue therapy.  Educated that extends unit and her massage gun to these areas would be beneficial to help calm this down as well.  Patient states understanding.  We will refill her pain medication and Robaxin. Follow-up in 2 months for repeat evaluation. All questions and concerns were addressed. The patient understands and agrees with the plan of care.    The above findings, diagnostics, and treatment plan were discussed with Dr. Michel Peña who is in agreement with the plan of care.      This note was created using [employer] voice recognition software that occasionally misinterpreted phrases or words.    Consult note is delivered via Epic messaging service.

## 2022-11-02 DIAGNOSIS — Z47.1 AFTERCARE FOLLOWING LEFT HIP JOINT REPLACEMENT SURGERY: ICD-10-CM

## 2022-11-02 DIAGNOSIS — Z96.642 AFTERCARE FOLLOWING LEFT HIP JOINT REPLACEMENT SURGERY: ICD-10-CM

## 2022-11-29 ENCOUNTER — OFFICE VISIT (OUTPATIENT)
Dept: ORTHOPEDICS | Facility: CLINIC | Age: 42
End: 2022-11-29
Payer: COMMERCIAL

## 2022-11-29 VITALS — BODY MASS INDEX: 39.65 KG/M2 | HEIGHT: 61 IN | WEIGHT: 210 LBS

## 2022-11-29 DIAGNOSIS — Z96.642 AFTERCARE FOLLOWING LEFT HIP JOINT REPLACEMENT SURGERY: Primary | ICD-10-CM

## 2022-11-29 DIAGNOSIS — Z47.1 AFTERCARE FOLLOWING LEFT HIP JOINT REPLACEMENT SURGERY: Primary | ICD-10-CM

## 2022-11-29 PROCEDURE — 3008F PR BODY MASS INDEX (BMI) DOCUMENTED: ICD-10-PCS | Mod: CPTII,,, | Performed by: ORTHOPAEDIC SURGERY

## 2022-11-29 PROCEDURE — 3008F BODY MASS INDEX DOCD: CPT | Mod: CPTII,,, | Performed by: ORTHOPAEDIC SURGERY

## 2022-11-29 PROCEDURE — 1159F MED LIST DOCD IN RCRD: CPT | Mod: CPTII,,, | Performed by: ORTHOPAEDIC SURGERY

## 2022-11-29 PROCEDURE — 99213 PR OFFICE/OUTPT VISIT, EST, LEVL III, 20-29 MIN: ICD-10-PCS | Mod: ,,, | Performed by: ORTHOPAEDIC SURGERY

## 2022-11-29 PROCEDURE — 1159F PR MEDICATION LIST DOCUMENTED IN MEDICAL RECORD: ICD-10-PCS | Mod: CPTII,,, | Performed by: ORTHOPAEDIC SURGERY

## 2022-11-29 PROCEDURE — 99213 OFFICE O/P EST LOW 20 MIN: CPT | Mod: ,,, | Performed by: ORTHOPAEDIC SURGERY

## 2022-11-29 NOTE — PROGRESS NOTES
Past Medical History:   Diagnosis Date    ARDS (adult respiratory distress syndrome)     Arthritis     Thyroid disease        Past Surgical History:   Procedure Laterality Date    ADENOIDECTOMY  1986    ARTHROCENTESIS OF HIP JOINT Left 2022    Procedure: ARTHROCENTESIS, HIP aspiration under fluroscopy;  Surgeon: Michel Peña MD;  Location: [facility] OR;  Service: Orthopedics;  Laterality: Left;     SECTION      x 2     SECTION   &    CHOLECYSTECTOMY  2011    INSERTION OF PERCUTANEOUS ENDOSCOPIC GASTROSTOMY (PEG) FEEDING TUBE      JOINT REPLACEMENT      KNEE ARTHROSCOPY Right     LAPAROSCOPIC CHOLECYSTECTOMY      ORIF HIP FRACTURE Left     X2    PEG TUBE REMOVAL      REMOVAL OF TRACHEOSTOMY TUBE      REVISION TOTAL HIP ARTHROPLASTY Left 08/15/2022    Procedure: REVISION, TOTAL ARTHROPLASTY, HIP femur / cell saver / chetan / rest mod vs insignia / pathology;  Surgeon: Michel Peña MD;  Location: [facility] OR;  Service: Orthopedics;  Laterality: Left;    SINUS SURGERY      x2    TONSILLECTOMY  1986    TOTAL HIP ARTHROPLASTY Left     TRACHEOTOMY         Current Outpatient Medications   Medication Sig    albuterol (PROVENTIL) 2.5 mg /3 mL (0.083 %) nebulizer solution Inhale 1 mL into the lungs daily as needed.    ALBUTEROL INHL Inhale 1 puff into the lungs daily as needed.    b complex vitamins capsule Take 1 capsule by mouth once daily.    b complex vitamins tablet vitamin B complex    biotin 10 mg Tab biotin    calcium carbonate (OS-LOGAN) 600 mg calcium (1,500 mg) Tab     cholecalciferol, vitamin D3, 125 mcg (5,000 unit) Tab Take by mouth.    clonazePAM (KLONOPIN) 0.5 MG tablet Take 0.5 mg by mouth 2 (two) times daily as needed.    collagenase Clostridium hist. (COLLAGENASE CLOS HIST., BULK,) Powd 1 Scoop by Other route daily as needed.    fluticasone furoate-vilanteroL (BREO ELLIPTA) 100-25 mcg/dose diskus inhaler Inhale 1 puff into the lungs daily as needed.    levothyroxine  (SYNTHROID) 88 MCG tablet Take 1 tablet by mouth every morning.    meloxicam (MOBIC) 15 MG tablet Take 1 tablet (15 mg total) by mouth once daily.    multivitamin with minerals tablet Take 1 tablet by mouth once daily at 6am.    multivitamin-minerals-lutein (MULTIVITAMIN 50 PLUS) Tab multivitamin    ascorbic acid, vitamin C, (VITAMIN C) 500 MG tablet Take 500 mg by mouth.    aspirin (ECOTRIN) 81 MG EC tablet Take 1 tablet (81 mg total) by mouth every 12 (twelve) hours.    fluticasone propionate (FLOVENT HFA) 110 mcg/actuation inhaler Inhale 2 puffs into the lungs daily as needed.    gabapentin (NEURONTIN) 300 MG capsule Take 1 capsule (300 mg total) by mouth 2 (two) times daily. (Patient not taking: Reported on 8/19/2022)    nitrofurantoin, macrocrystal-monohydrate, (MACROBID) 100 MG capsule Take 100 mg by mouth 2 (two) times daily.     No current facility-administered medications for this visit.       Review of patient's allergies indicates:   Allergen Reactions    Tizanidine Rash       Family History   Problem Relation Age of Onset    Asthma Mother     Hearing loss Mother     Miscarriages / Stillbirths Mother     Diabetes Maternal Grandfather     Cancer Maternal Grandmother         Breast    Hearing loss Maternal Grandmother     Heart disease Maternal Grandmother     Stroke Maternal Grandmother        Social History     Socioeconomic History    Marital status: Single   Tobacco Use    Smoking status: Former     Packs/day: 0.50     Years: 24.00     Pack years: 12.00     Types: Cigarettes    Smokeless tobacco: Never    Tobacco comments:     Pt quit smoking about year in half the patient smoked for total of of 24 years. Pt used to smoke about .5PPD.   Substance and Sexual Activity    Alcohol use: Yes     Alcohol/week: 1.0 standard drink     Types: 1 Glasses of wine per week     Comment: occas    Drug use: Never    Sexual activity: Yes     Partners: Male     Birth control/protection: None     Comment:  had  vasectomy     Social Determinants of Health     Financial Resource Strain: Low Risk     Difficulty of Paying Living Expenses: Not hard at all   Food Insecurity: No Food Insecurity    Worried About Running Out of Food in the Last Year: Never true    Ran Out of Food in the Last Year: Never true   Transportation Needs: No Transportation Needs    Lack of Transportation (Medical): No    Lack of Transportation (Non-Medical): No   Physical Activity: Inactive    Days of Exercise per Week: 0 days    Minutes of Exercise per Session: 0 min   Stress: No Stress Concern Present    Feeling of Stress : Only a little   Social Connections: Unknown    Frequency of Communication with Friends and Family: More than three times a week    Frequency of Social Gatherings with Friends and Family: Patient refused    Active Member of Clubs or Organizations: No    Attends Club or Organization Meetings: Never    Marital Status: Living with partner   Housing Stability: Low Risk     Unable to Pay for Housing in the Last Year: No    Number of Places Lived in the Last Year: 1    Unstable Housing in the Last Year: No       Chief Complaint:   Chief Complaint   Patient presents with    Hip Pain     left LAVERNE 08/15/22. complaints of pain but improving. currently using cane. ROM is limited. taking mobic daily.        History of present illness:  42-year-old female presents status post revision of her left hip.  Overall patient is doing better than her preoperative levels.  She continues to see improvement on a weekly basis.      Review of Systems:    Constitution: Negative for chills, fever, and sweats.  Negative for unexplained weight loss.    HENT:  Negative for headaches and blurry vision.    Cardiovascular:Negative for chest pain or irregular heart beat. Negative for hypertension.    Respiratory:  Negative for cough and shortness of breath.    Gastrointestinal: Negative for abdominal pain, heartburn, melena, nausea, and vomitting.    Genitourinary:   Negative bladder incontinence and dysuria.    Musculoskeletal:  See HPI    Neurological: Negative for numbness.    Psychiatric/Behavioral: Negative for depression.  The patient is not nervous/anxious.      Endocrine: Negative for polyuria    Hematologic/Lymphatic: Negative for bleeding problem.  Does not bruise/bleed easily.    Skin: Negative for poor would healing and rash      Physical Examination:    Vital Signs:    Vitals:       Body mass index is 39.68 kg/m².    General: No acute distress, alert and oriented, healthy appearing    HEENT: Head is atraumatic, mucous membranes are moist    Neck: Supples, no JVD    Cardiovascular: Palpable dorsalis pedis and posterior tibial pulses, regular rate and rhythm to those pulses    Lungs: Breathing non-labored    Skin: no rashes appreciated    Neurologic: Can flex and extend knees, ankles, and toes. Sensation is grossly intact    Left hip:  Range of motion left hip without significant pain or discomfort.  She has stable gait.  No antalgic gait.  Neurovascularly intact distally left foot    X-rays:      Assessment::  Status post revision left hip    Plan:  Overall patient is seeing slow but steady improvement.  Recommend ongoing physical therapy for gait training range of motion stretching.  We will plan to see her back in 3 months.  Repeat x-rays of her left hip at that point or sooner should there be any issues questions or concerns    This note was created using [employer] voice recognition software that occasionally misinterpreted phrases or words.    Consult note is delivered via Epic messaging service.

## 2022-11-30 ENCOUNTER — DOCUMENTATION ONLY (OUTPATIENT)
Dept: INTERNAL MEDICINE | Facility: CLINIC | Age: 42
End: 2022-11-30
Payer: COMMERCIAL

## 2022-12-01 RX ORDER — HYDROCODONE BITARTRATE AND ACETAMINOPHEN 7.5; 325 MG/1; MG/1
1 TABLET ORAL EVERY 6 HOURS PRN
Qty: 28 TABLET | Refills: 0 | OUTPATIENT
Start: 2022-12-01 | End: 2022-12-08

## 2022-12-01 RX ORDER — TRAMADOL HYDROCHLORIDE 50 MG/1
50 TABLET ORAL EVERY 6 HOURS PRN
Qty: 28 TABLET | Refills: 0 | Status: SHIPPED | OUTPATIENT
Start: 2022-12-01 | End: 2022-12-08

## 2023-02-28 ENCOUNTER — HOSPITAL ENCOUNTER (OUTPATIENT)
Dept: RADIOLOGY | Facility: HOSPITAL | Age: 43
Discharge: HOME | End: 2023-02-28
Attending: NURSE PRACTITIONER
Payer: COMMERCIAL

## 2023-02-28 ENCOUNTER — OFFICE VISIT (OUTPATIENT)
Dept: ORTHOPEDICS | Facility: CLINIC | Age: 43
End: 2023-02-28
Payer: COMMERCIAL

## 2023-02-28 ENCOUNTER — HOSPITAL ENCOUNTER (OUTPATIENT)
Dept: RADIOLOGY | Facility: CLINIC | Age: 43
Discharge: HOME | End: 2023-02-28
Attending: NURSE PRACTITIONER
Payer: COMMERCIAL

## 2023-02-28 ENCOUNTER — HOSPITAL ENCOUNTER (OUTPATIENT)
Dept: RADIOLOGY | Facility: CLINIC | Age: 43
Discharge: HOME | End: 2023-02-28
Attending: ORTHOPAEDIC SURGERY
Payer: COMMERCIAL

## 2023-02-28 VITALS — WEIGHT: 208 LBS | BODY MASS INDEX: 39.27 KG/M2 | HEIGHT: 61 IN

## 2023-02-28 DIAGNOSIS — Z96.642 AFTERCARE FOLLOWING LEFT HIP JOINT REPLACEMENT SURGERY: ICD-10-CM

## 2023-02-28 DIAGNOSIS — Z47.1 AFTERCARE FOLLOWING LEFT HIP JOINT REPLACEMENT SURGERY: ICD-10-CM

## 2023-02-28 DIAGNOSIS — Z96.642 AFTERCARE FOLLOWING LEFT HIP JOINT REPLACEMENT SURGERY: Primary | ICD-10-CM

## 2023-02-28 DIAGNOSIS — Z47.1 AFTERCARE FOLLOWING LEFT HIP JOINT REPLACEMENT SURGERY: Primary | ICD-10-CM

## 2023-02-28 DIAGNOSIS — R06.00 DYSPNEA, UNSPECIFIED TYPE: ICD-10-CM

## 2023-02-28 PROBLEM — Z87.09 HISTORY OF ARDS: Status: ACTIVE | Noted: 2023-02-28

## 2023-02-28 PROBLEM — J38.00 VOCAL CORD PARESIS: Status: ACTIVE | Noted: 2023-02-28

## 2023-02-28 PROCEDURE — 73502 X-RAY EXAM HIP UNI 2-3 VIEWS: CPT | Mod: LT,,, | Performed by: ORTHOPAEDIC SURGERY

## 2023-02-28 PROCEDURE — 71046 X-RAY EXAM CHEST 2 VIEWS: CPT | Mod: TC

## 2023-02-28 PROCEDURE — 73502 XR HIP WITH PELVIS WHEN PERFORMED, 2 OR 3 VIEWS LEFT: ICD-10-PCS | Mod: LT,,, | Performed by: ORTHOPAEDIC SURGERY

## 2023-02-28 PROCEDURE — 99213 OFFICE O/P EST LOW 20 MIN: CPT | Mod: ,,, | Performed by: ORTHOPAEDIC SURGERY

## 2023-02-28 PROCEDURE — 3008F BODY MASS INDEX DOCD: CPT | Mod: CPTII,,, | Performed by: ORTHOPAEDIC SURGERY

## 2023-02-28 PROCEDURE — 3008F PR BODY MASS INDEX (BMI) DOCUMENTED: ICD-10-PCS | Mod: CPTII,,, | Performed by: ORTHOPAEDIC SURGERY

## 2023-02-28 PROCEDURE — 99213 PR OFFICE/OUTPT VISIT, EST, LEVL III, 20-29 MIN: ICD-10-PCS | Mod: ,,, | Performed by: ORTHOPAEDIC SURGERY

## 2023-02-28 RX ORDER — TRAMADOL HYDROCHLORIDE 50 MG/1
1 TABLET ORAL EVERY 6 HOURS PRN
COMMUNITY
Start: 2022-12-01 | End: 2023-12-15

## 2023-02-28 NOTE — PROGRESS NOTES
Chief Complaint:   Chief Complaint   Patient presents with    Follow-up     Pt reports a tingling radiating sensation, sharp/stabbing and pins and needles sensation in her Lt leg. Pt states the pain is contant and unable her to walk long distances or exercise. Pt finished her PT in the middle of January . Pt has been rubbing her hip with some Icy hot with no relief. Pt still has a rx for Flexeril and takes when the pain gets worse.       History of present illness:  42-year-old female presents for follow-up of total hip arthroplasty revision on the left side.  Patient continues to have pain in the left hip.  It is located in her groin on occasion.  It is not all the time but only certain positions.  She can she uses no assistive devices today.  She does have difficulty squatting.  She says her pain is not significantly worsened however has not significantly improved in the last 3 months.    Past Medical History:   Diagnosis Date    ARDS (adult respiratory distress syndrome)     Arthritis     Thyroid disease        Past Surgical History:   Procedure Laterality Date    ADENOIDECTOMY  1986    ARTHROCENTESIS OF HIP JOINT Left 2022    Procedure: ARTHROCENTESIS, HIP aspiration under fluroscopy;  Surgeon: Michel Peña MD;  Location: [facility];  Service: Orthopedics;  Laterality: Left;     SECTION      x 2     SECTION   &2018    CHOLECYSTECTOMY  2011    INSERTION OF PERCUTANEOUS ENDOSCOPIC GASTROSTOMY (PEG) FEEDING TUBE      JOINT REPLACEMENT      KNEE ARTHROSCOPY Right     LAPAROSCOPIC CHOLECYSTECTOMY      ORIF HIP FRACTURE Left     X2    PEG TUBE REMOVAL      REMOVAL OF TRACHEOSTOMY TUBE      REVISION TOTAL HIP ARTHROPLASTY Left 08/15/2022    Procedure: REVISION, TOTAL ARTHROPLASTY, HIP femur / cell saver / chetan / rest mod vs insignia / pathology;  Surgeon: Michel Peña MD;  Location: [facility] OR;  Service: Orthopedics;  Laterality: Left;    SINUS SURGERY      x2    TONSILLECTOMY       TOTAL HIP ARTHROPLASTY Left     TRACHEOTOMY         Current Outpatient Medications   Medication Sig    albuterol (PROVENTIL) 2.5 mg /3 mL (0.083 %) nebulizer solution Inhale 1 mL into the lungs daily as needed.    ALBUTEROL INHL Inhale 1 puff into the lungs daily as needed.    b complex vitamins capsule Take 1 capsule by mouth once daily.    b complex vitamins tablet vitamin B complex    biotin 10 mg Tab biotin    calcium carbonate (OS-LOGAN) 600 mg calcium (1,500 mg) Tab     cholecalciferol, vitamin D3, 125 mcg (5,000 unit) Tab Take by mouth.    clonazePAM (KLONOPIN) 0.5 MG tablet Take 0.5 mg by mouth 2 (two) times daily as needed.    fluticasone furoate-vilanteroL (BREO ELLIPTA) 100-25 mcg/dose diskus inhaler Inhale 1 puff into the lungs daily as needed.    meloxicam (MOBIC) 15 MG tablet Take 1 tablet (15 mg total) by mouth once daily.    multivitamin with minerals tablet Take 1 tablet by mouth once daily at 6am.    traMADoL (ULTRAM) 50 mg tablet Take 1 tablet by mouth every 6 (six) hours as needed.    ascorbic acid, vitamin C, (VITAMIN C) 500 MG tablet Take 500 mg by mouth.    aspirin (ECOTRIN) 81 MG EC tablet Take 1 tablet (81 mg total) by mouth every 12 (twelve) hours.    collagenase Clostridium hist. (COLLAGENASE CLOS HIST., BULK,) Powd 1 Scoop by Other route daily as needed.    fluticasone propionate (FLOVENT HFA) 110 mcg/actuation inhaler Inhale 1 puff into the lungs 2 (two) times daily. Controller    gabapentin (NEURONTIN) 300 MG capsule Take 1 capsule (300 mg total) by mouth 2 (two) times daily. (Patient not taking: Reported on 8/19/2022)    levothyroxine (SYNTHROID) 88 MCG tablet Take 88 mcg by mouth before breakfast.    multivitamin-minerals-lutein (MULTIVITAMIN 50 PLUS) Tab multivitamin    nitrofurantoin, macrocrystal-monohydrate, (MACROBID) 100 MG capsule Take 100 mg by mouth 2 (two) times daily.    predniSONE (DELTASONE) 10 MG tablet Take 3 tabs po daily for 3 days, then decrease to 3 tab po daily  for 3 days then stop     No current facility-administered medications for this visit.       Review of patient's allergies indicates:   Allergen Reactions    Tizanidine Rash       Family History   Problem Relation Age of Onset    Asthma Mother     Hearing loss Mother     Miscarriages / Stillbirths Mother     Diabetes Maternal Grandfather     Cancer Maternal Grandmother         Breast    Hearing loss Maternal Grandmother     Heart disease Maternal Grandmother     Stroke Maternal Grandmother        Social History     Socioeconomic History    Marital status: Single   Tobacco Use    Smoking status: Former     Packs/day: 0.50     Years: 24.00     Pack years: 12.00     Types: Cigarettes    Smokeless tobacco: Never    Tobacco comments:     Pt quit smoking about year in half the patient smoked for total of of 24 years. Pt used to smoke about .5PPD.   Substance and Sexual Activity    Alcohol use: Yes     Alcohol/week: 1.0 standard drink     Types: 1 Glasses of wine per week     Comment: occas    Drug use: Never    Sexual activity: Yes     Partners: Male     Birth control/protection: None     Comment:  had vasectomy     Social Determinants of Health     Financial Resource Strain: Low Risk     Difficulty of Paying Living Expenses: Not hard at all   Food Insecurity: No Food Insecurity    Worried About Running Out of Food in the Last Year: Never true    Ran Out of Food in the Last Year: Never true   Transportation Needs: No Transportation Needs    Lack of Transportation (Medical): No    Lack of Transportation (Non-Medical): No   Physical Activity: Inactive    Days of Exercise per Week: 0 days    Minutes of Exercise per Session: 0 min   Stress: No Stress Concern Present    Feeling of Stress : Only a little   Social Connections: Unknown    Frequency of Communication with Friends and Family: More than three times a week    Frequency of Social Gatherings with Friends and Family: Patient refused    Active Member of Clubs or  Organizations: No    Attends Club or Organization Meetings: Never    Marital Status: Living with partner   Housing Stability: Low Risk     Unable to Pay for Housing in the Last Year: No    Number of Places Lived in the Last Year: 1    Unstable Housing in the Last Year: No           Review of Systems:    Constitution: Negative for chills, fever, and sweats.  Negative for unexplained weight loss.    HENT:  Negative for headaches and blurry vision.    Cardiovascular:Negative for chest pain or irregular heart beat. Negative for hypertension.    Respiratory:  Negative for cough and shortness of breath.    Gastrointestinal: Negative for abdominal pain, heartburn, melena, nausea, and vomitting.    Genitourinary:  Negative bladder incontinence and dysuria.    Musculoskeletal:  See HPI    Neurological: Negative for numbness.    Psychiatric/Behavioral: Negative for depression.  The patient is not nervous/anxious.      Endocrine: Negative for polyuria    Hematologic/Lymphatic: Negative for bleeding problem.  Does not bruise/bleed easily.    Skin: Negative for poor would healing and rash      Physical Examination:    Vital Signs:  There were no vitals filed for this visit.    Body mass index is 39.3 kg/m².    General: No acute distress, alert and oriented, healthy appearing    HEENT: Head is atraumatic, mucous membranes are moist    Neck: Supples, no JVD    Cardiovascular: Palpable dorsalis pedis and posterior tibial pulses, regular rate and rhythm to those pulses    Lungs: Breathing non-labored    Skin: no rashes appreciated    Neurologic: Can flex and extend knees, ankles, and toes. Sensation is grossly intact    Left hip :  Range of motion left hip without significant pain or discomfort.  Patient has tenderness anteriorly over her iliopsoas tendon.  Leg lengths equal.  Neurovascular intact distally left foot.    X-rays:  Three views left hip reviewed.  Patient's implants appear well fixed.  No signs of loosening or  subsidence noted currently     Assessment::  Status post left total hip arthroplasty revision    Plan:  Implants appear stable.  Patient is much improved from preoperative levels.  We will continue to monitor her closely.  See her back in 3 months with repeat x-rays.    This note was created using [employer] voice recognition software that occasionally misinterpreted phrases or words.    Consult note is delivered via Epic messaging service.

## 2023-04-03 ENCOUNTER — CLINICAL SUPPORT (OUTPATIENT)
Dept: RESPIRATORY THERAPY | Facility: HOSPITAL | Age: 43
End: 2023-04-03
Attending: FAMILY MEDICINE
Payer: COMMERCIAL

## 2023-04-03 VITALS — RESPIRATION RATE: 18 BRPM | HEART RATE: 62 BPM | OXYGEN SATURATION: 97 %

## 2023-04-03 DIAGNOSIS — R06.00 DYSPNEA, UNSPECIFIED TYPE: Primary | ICD-10-CM

## 2023-04-03 PROCEDURE — 94761 N-INVAS EAR/PLS OXIMETRY MLT: CPT

## 2023-04-03 PROCEDURE — 95070 INHLJ BRNCL CHALLENGE TSTG: CPT

## 2023-04-03 PROCEDURE — 25000242 PHARM REV CODE 250 ALT 637 W/ HCPCS: Performed by: NURSE PRACTITIONER

## 2023-04-03 PROCEDURE — 94729 DIFFUSING CAPACITY: CPT

## 2023-04-03 PROCEDURE — 94727 GAS DIL/WSHOT DETER LNG VOL: CPT

## 2023-04-03 RX ORDER — ALBUTEROL SULFATE 2.5 MG/.5ML
2.5 SOLUTION RESPIRATORY (INHALATION)
Status: DISPENSED | OUTPATIENT
Start: 2023-04-03

## 2023-04-03 RX ORDER — ALBUTEROL SULFATE 0.83 MG/ML
2.5 SOLUTION RESPIRATORY (INHALATION)
Status: COMPLETED | OUTPATIENT
Start: 2023-04-03 | End: 2023-04-03

## 2023-04-03 RX ADMIN — ALBUTEROL SULFATE 2.5 MG: 2.5 SOLUTION RESPIRATORY (INHALATION) at 01:04

## 2023-06-13 ENCOUNTER — OFFICE VISIT (OUTPATIENT)
Dept: ORTHOPEDICS | Facility: CLINIC | Age: 43
End: 2023-06-13
Payer: COMMERCIAL

## 2023-06-13 ENCOUNTER — HOSPITAL ENCOUNTER (OUTPATIENT)
Dept: RADIOLOGY | Facility: CLINIC | Age: 43
Discharge: HOME | End: 2023-06-13
Attending: NURSE PRACTITIONER
Payer: COMMERCIAL

## 2023-06-13 VITALS
HEART RATE: 83 BPM | SYSTOLIC BLOOD PRESSURE: 119 MMHG | WEIGHT: 205 LBS | DIASTOLIC BLOOD PRESSURE: 78 MMHG | BODY MASS INDEX: 38.71 KG/M2 | HEIGHT: 61 IN

## 2023-06-13 DIAGNOSIS — Z96.642 AFTERCARE FOLLOWING LEFT HIP JOINT REPLACEMENT SURGERY: ICD-10-CM

## 2023-06-13 DIAGNOSIS — Z47.1 AFTERCARE FOLLOWING LEFT HIP JOINT REPLACEMENT SURGERY: ICD-10-CM

## 2023-06-13 DIAGNOSIS — Z47.1 AFTERCARE FOLLOWING LEFT HIP JOINT REPLACEMENT SURGERY: Primary | ICD-10-CM

## 2023-06-13 DIAGNOSIS — G89.29 CHRONIC BILATERAL LOW BACK PAIN WITHOUT SCIATICA: ICD-10-CM

## 2023-06-13 DIAGNOSIS — Z96.642 AFTERCARE FOLLOWING LEFT HIP JOINT REPLACEMENT SURGERY: Primary | ICD-10-CM

## 2023-06-13 DIAGNOSIS — M54.50 CHRONIC BILATERAL LOW BACK PAIN WITHOUT SCIATICA: ICD-10-CM

## 2023-06-13 PROCEDURE — 99213 OFFICE O/P EST LOW 20 MIN: CPT | Mod: ,,, | Performed by: ORTHOPAEDIC SURGERY

## 2023-06-13 PROCEDURE — 3078F DIAST BP <80 MM HG: CPT | Mod: CPTII,,, | Performed by: ORTHOPAEDIC SURGERY

## 2023-06-13 PROCEDURE — 1159F PR MEDICATION LIST DOCUMENTED IN MEDICAL RECORD: ICD-10-PCS | Mod: CPTII,,, | Performed by: ORTHOPAEDIC SURGERY

## 2023-06-13 PROCEDURE — 73502 X-RAY EXAM HIP UNI 2-3 VIEWS: CPT | Mod: LT,,, | Performed by: NURSE PRACTITIONER

## 2023-06-13 PROCEDURE — 3008F PR BODY MASS INDEX (BMI) DOCUMENTED: ICD-10-PCS | Mod: CPTII,,, | Performed by: ORTHOPAEDIC SURGERY

## 2023-06-13 PROCEDURE — 3008F BODY MASS INDEX DOCD: CPT | Mod: CPTII,,, | Performed by: ORTHOPAEDIC SURGERY

## 2023-06-13 PROCEDURE — 99213 PR OFFICE/OUTPT VISIT, EST, LEVL III, 20-29 MIN: ICD-10-PCS | Mod: ,,, | Performed by: ORTHOPAEDIC SURGERY

## 2023-06-13 PROCEDURE — 3074F PR MOST RECENT SYSTOLIC BLOOD PRESSURE < 130 MM HG: ICD-10-PCS | Mod: CPTII,,, | Performed by: ORTHOPAEDIC SURGERY

## 2023-06-13 PROCEDURE — 1159F MED LIST DOCD IN RCRD: CPT | Mod: CPTII,,, | Performed by: ORTHOPAEDIC SURGERY

## 2023-06-13 PROCEDURE — 3078F PR MOST RECENT DIASTOLIC BLOOD PRESSURE < 80 MM HG: ICD-10-PCS | Mod: CPTII,,, | Performed by: ORTHOPAEDIC SURGERY

## 2023-06-13 PROCEDURE — 73502 XR HIP WITH PELVIS WHEN PERFORMED, 2 OR 3 VIEWS LEFT: ICD-10-PCS | Mod: LT,,, | Performed by: NURSE PRACTITIONER

## 2023-06-13 PROCEDURE — 3074F SYST BP LT 130 MM HG: CPT | Mod: CPTII,,, | Performed by: ORTHOPAEDIC SURGERY

## 2023-06-13 NOTE — PROGRESS NOTES
Chief Complaint:   Chief Complaint   Patient presents with    Left Hip - Pain    Hip Pain     Pt states she has been experiencing constant pain in her Lt hip. Pain is located in the groin are where she presents a knot/stiffness. Pt states she had massage to help to loosen up the muscles where helped temporarily. Pt describes her discomfort as sudden sharp pain in the groin, ache and tightness.  Pt wanted to report an incident where her whole leg buckled and she almost fell. Pt landed with her leg crossed backwards.       History of present illness:  42-year-old female presents today for evaluation follow-up of total hip arthroplasty on the left side.  Patient is status post revision about 10 months ago.  Overall she is doing well compared to her preoperative levels.  She continues to have some anterior groin pain.    Past Medical History:   Diagnosis Date    ARDS (adult respiratory distress syndrome)     Arthritis     Thyroid disease        Past Surgical History:   Procedure Laterality Date    ADENOIDECTOMY      ARTHROCENTESIS OF HIP JOINT Left 2022    Procedure: ARTHROCENTESIS, HIP aspiration under fluroscopy;  Surgeon: Michel Peña MD;  Location: [facility] OR;  Service: Orthopedics;  Laterality: Left;     SECTION      x 2     SECTION   &    CHOLECYSTECTOMY      INSERTION OF PERCUTANEOUS ENDOSCOPIC GASTROSTOMY (PEG) FEEDING TUBE      JOINT REPLACEMENT      KNEE ARTHROSCOPY Right     LAPAROSCOPIC CHOLECYSTECTOMY      ORIF HIP FRACTURE Left     X2    PEG TUBE REMOVAL      REMOVAL OF TRACHEOSTOMY TUBE      REVISION TOTAL HIP ARTHROPLASTY Left 08/15/2022    Procedure: REVISION, TOTAL ARTHROPLASTY, HIP femur / cell saver / chetan / rest mod vs insignia / pathology;  Surgeon: Michel Peña MD;  Location: [facility] OR;  Service: Orthopedics;  Laterality: Left;    SINUS SURGERY      x2    TONSILLECTOMY      TOTAL HIP ARTHROPLASTY Left     TRACHEOTOMY         Current Outpatient  Medications   Medication Sig    albuterol (PROVENTIL) 2.5 mg /3 mL (0.083 %) nebulizer solution Inhale 1 mL into the lungs daily as needed.    ALBUTEROL INHL Inhale 1 puff into the lungs daily as needed.    b complex vitamins capsule Take 1 capsule by mouth once daily.    b complex vitamins tablet vitamin B complex    biotin 10 mg Tab biotin    calcium carbonate (OS-LOGAN) 600 mg calcium (1,500 mg) Tab     cholecalciferol, vitamin D3, 125 mcg (5,000 unit) Tab Take by mouth.    clonazePAM (KLONOPIN) 0.5 MG tablet Take 0.5 mg by mouth 2 (two) times daily as needed.    fluticasone furoate-vilanteroL (BREO ELLIPTA) 100-25 mcg/dose diskus inhaler Inhale 1 puff into the lungs daily as needed.    fluticasone furoate-vilanteroL (BREO ELLIPTA) 100-25 mcg/dose diskus inhaler Inhale 1 puff into the lungs once daily. Controller    fluticasone propionate (FLOVENT HFA) 110 mcg/actuation inhaler Inhale 1 puff into the lungs 2 (two) times daily. Controller    levothyroxine (SYNTHROID) 88 MCG tablet Take 88 mcg by mouth before breakfast.    meloxicam (MOBIC) 15 MG tablet Take 1 tablet (15 mg total) by mouth once daily.    multivitamin with minerals tablet Take 1 tablet by mouth once daily at 6am.    ascorbic acid, vitamin C, (VITAMIN C) 500 MG tablet Take 500 mg by mouth.    aspirin (ECOTRIN) 81 MG EC tablet Take 1 tablet (81 mg total) by mouth every 12 (twelve) hours.    collagenase Clostridium hist. (COLLAGENASE CLOS HIST., BULK,) Powd 1 Scoop by Other route daily as needed.    gabapentin (NEURONTIN) 300 MG capsule Take 1 capsule (300 mg total) by mouth 2 (two) times daily. (Patient not taking: Reported on 8/19/2022)    multivitamin-minerals-lutein (MULTIVITAMIN 50 PLUS) Tab multivitamin    nitrofurantoin, macrocrystal-monohydrate, (MACROBID) 100 MG capsule Take 100 mg by mouth 2 (two) times daily.    predniSONE (DELTASONE) 10 MG tablet Take 3 tabs po daily for 3 days, then decrease to 3 tab po daily for 3 days then stop    traMADoL  (ULTRAM) 50 mg tablet Take 1 tablet by mouth every 6 (six) hours as needed.     Current Facility-Administered Medications   Medication    albuterol sulfate nebulizer solution 2.5 mg       Review of patient's allergies indicates:   Allergen Reactions    Tizanidine Rash       Family History   Problem Relation Age of Onset    Asthma Mother     Hearing loss Mother     Miscarriages / Stillbirths Mother     Diabetes Maternal Grandfather     Cancer Maternal Grandmother         Breast    Hearing loss Maternal Grandmother     Heart disease Maternal Grandmother     Stroke Maternal Grandmother        Social History     Socioeconomic History    Marital status: Single   Tobacco Use    Smoking status: Former     Packs/day: 0.50     Years: 24.00     Pack years: 12.00     Types: Cigarettes    Smokeless tobacco: Never    Tobacco comments:     Pt quit smoking about year in half the patient smoked for total of of 24 years. Pt used to smoke about .5PPD.   Substance and Sexual Activity    Alcohol use: Yes     Alcohol/week: 1.0 standard drink     Types: 1 Glasses of wine per week     Comment: occas    Drug use: Never    Sexual activity: Yes     Partners: Male     Birth control/protection: None     Comment:  had vasectomy     Social Determinants of Health     Financial Resource Strain: Low Risk     Difficulty of Paying Living Expenses: Not hard at all   Food Insecurity: No Food Insecurity    Worried About Running Out of Food in the Last Year: Never true    Ran Out of Food in the Last Year: Never true   Transportation Needs: No Transportation Needs    Lack of Transportation (Medical): No    Lack of Transportation (Non-Medical): No   Physical Activity: Inactive    Days of Exercise per Week: 0 days    Minutes of Exercise per Session: 0 min   Stress: No Stress Concern Present    Feeling of Stress : Only a little   Social Connections: Unknown    Frequency of Communication with Friends and Family: More than three times a week     Frequency of Social Gatherings with Friends and Family: Patient refused    Active Member of Clubs or Organizations: No    Attends Club or Organization Meetings: Never    Marital Status: Living with partner   Housing Stability: Low Risk     Unable to Pay for Housing in the Last Year: No    Number of Places Lived in the Last Year: 1    Unstable Housing in the Last Year: No           Review of Systems:    Constitution: Negative for chills, fever, and sweats.  Negative for unexplained weight loss.    HENT:  Negative for headaches and blurry vision.    Cardiovascular:Negative for chest pain or irregular heart beat. Negative for hypertension.    Respiratory:  Negative for cough and shortness of breath.    Gastrointestinal: Negative for abdominal pain, heartburn, melena, nausea, and vomitting.    Genitourinary:  Negative bladder incontinence and dysuria.    Musculoskeletal:  See HPI    Neurological: Negative for numbness.    Psychiatric/Behavioral: Negative for depression.  The patient is not nervous/anxious.      Endocrine: Negative for polyuria    Hematologic/Lymphatic: Negative for bleeding problem.  Does not bruise/bleed easily.    Skin: Negative for poor would healing and rash      Physical Examination:    Vital Signs:    Vitals:    06/13/23 1327   BP: 119/78   Pulse: 83       Body mass index is 38.73 kg/m².    General: No acute distress, alert and oriented, healthy appearing    HEENT: Head is atraumatic, mucous membranes are moist    Neck: Supples, no JVD    Cardiovascular: Palpable dorsalis pedis and posterior tibial pulses, regular rate and rhythm to those pulses    Lungs: Breathing non-labored    Skin: no rashes appreciated    Neurologic: Can flex and extend knees, ankles, and toes. Sensation is grossly intact    Left hip:  Range of motion left hip without significant or severe pain.  She has some tenderness anteriorly over her hip flexors although this is mild.  Incision is clean and dry.    X-rays:  Three views  left hip reviewed.  Patient's implants appear well fixed.  No signs of loosening or subsidence noted     Assessment::  Status post conversion left total hip arthroplasty    Plan:  Discussed all treatment options the patient.  Overall she is doing fairly well.  Her incisions are healing well.  Mobility is much improved from preoperative levels.  She continues to have some pain in his anterior groin and appears to be related to scar tissue or muscular.  We would like to get her back into physical therapy work on her low back as she is having some lumbar radicular type pain.  She is also going to get a shoe lift to see if this can improve her symptoms as well given her slight leg length discrepancy    This note was created using [employer] voice recognition software that occasionally misinterpreted phrases or words.    Consult note is delivered via Epic messaging service.

## 2023-08-11 ENCOUNTER — PATIENT MESSAGE (OUTPATIENT)
Dept: ORTHOPEDICS | Facility: CLINIC | Age: 43
End: 2023-08-11
Payer: COMMERCIAL

## 2023-08-11 DIAGNOSIS — Z96.642 AFTERCARE FOLLOWING LEFT HIP JOINT REPLACEMENT SURGERY: Primary | ICD-10-CM

## 2023-08-11 DIAGNOSIS — Z96.642 AFTERCARE FOLLOWING LEFT HIP JOINT REPLACEMENT SURGERY: ICD-10-CM

## 2023-08-11 DIAGNOSIS — Z47.1 AFTERCARE FOLLOWING LEFT HIP JOINT REPLACEMENT SURGERY: Primary | ICD-10-CM

## 2023-08-11 DIAGNOSIS — Z47.1 AFTERCARE FOLLOWING LEFT HIP JOINT REPLACEMENT SURGERY: ICD-10-CM

## 2023-08-11 RX ORDER — MELOXICAM 15 MG/1
15 TABLET ORAL DAILY
Qty: 30 TABLET | Refills: 2 | Status: SHIPPED | OUTPATIENT
Start: 2023-08-11 | End: 2024-03-14

## 2023-08-11 RX ORDER — MELOXICAM 15 MG/1
15 TABLET ORAL DAILY
Qty: 30 TABLET | Refills: 2 | OUTPATIENT
Start: 2023-08-11

## 2023-08-29 ENCOUNTER — OFFICE VISIT (OUTPATIENT)
Dept: ORTHOPEDICS | Facility: CLINIC | Age: 43
End: 2023-08-29
Payer: COMMERCIAL

## 2023-08-29 ENCOUNTER — HOSPITAL ENCOUNTER (OUTPATIENT)
Dept: RADIOLOGY | Facility: CLINIC | Age: 43
Discharge: HOME | End: 2023-08-29
Attending: ORTHOPAEDIC SURGERY
Payer: COMMERCIAL

## 2023-08-29 VITALS
HEART RATE: 73 BPM | HEIGHT: 61 IN | BODY MASS INDEX: 38.71 KG/M2 | SYSTOLIC BLOOD PRESSURE: 105 MMHG | WEIGHT: 205 LBS | DIASTOLIC BLOOD PRESSURE: 72 MMHG

## 2023-08-29 DIAGNOSIS — Z47.1 AFTERCARE FOLLOWING LEFT HIP JOINT REPLACEMENT SURGERY: Primary | ICD-10-CM

## 2023-08-29 DIAGNOSIS — Z96.642 AFTERCARE FOLLOWING LEFT HIP JOINT REPLACEMENT SURGERY: Primary | ICD-10-CM

## 2023-08-29 DIAGNOSIS — Z96.641 PRESENCE OF RIGHT ARTIFICIAL HIP JOINT: ICD-10-CM

## 2023-08-29 DIAGNOSIS — Z47.1 AFTERCARE FOLLOWING LEFT HIP JOINT REPLACEMENT SURGERY: ICD-10-CM

## 2023-08-29 DIAGNOSIS — M76.12 PSOAS TENDONITIS OF LEFT SIDE: ICD-10-CM

## 2023-08-29 DIAGNOSIS — Z96.642 AFTERCARE FOLLOWING LEFT HIP JOINT REPLACEMENT SURGERY: ICD-10-CM

## 2023-08-29 PROCEDURE — 1159F PR MEDICATION LIST DOCUMENTED IN MEDICAL RECORD: ICD-10-PCS | Mod: CPTII,,, | Performed by: ORTHOPAEDIC SURGERY

## 2023-08-29 PROCEDURE — 99213 OFFICE O/P EST LOW 20 MIN: CPT | Mod: ,,, | Performed by: ORTHOPAEDIC SURGERY

## 2023-08-29 PROCEDURE — 73502 XR HIP WITH PELVIS WHEN PERFORMED, 2 OR 3 VIEWS LEFT: ICD-10-PCS | Mod: LT,,, | Performed by: ORTHOPAEDIC SURGERY

## 2023-08-29 PROCEDURE — 3078F PR MOST RECENT DIASTOLIC BLOOD PRESSURE < 80 MM HG: ICD-10-PCS | Mod: CPTII,,, | Performed by: ORTHOPAEDIC SURGERY

## 2023-08-29 PROCEDURE — 99213 PR OFFICE/OUTPT VISIT, EST, LEVL III, 20-29 MIN: ICD-10-PCS | Mod: ,,, | Performed by: ORTHOPAEDIC SURGERY

## 2023-08-29 PROCEDURE — 3074F SYST BP LT 130 MM HG: CPT | Mod: CPTII,,, | Performed by: ORTHOPAEDIC SURGERY

## 2023-08-29 PROCEDURE — 73502 X-RAY EXAM HIP UNI 2-3 VIEWS: CPT | Mod: LT,,, | Performed by: ORTHOPAEDIC SURGERY

## 2023-08-29 PROCEDURE — 3074F PR MOST RECENT SYSTOLIC BLOOD PRESSURE < 130 MM HG: ICD-10-PCS | Mod: CPTII,,, | Performed by: ORTHOPAEDIC SURGERY

## 2023-08-29 PROCEDURE — 3078F DIAST BP <80 MM HG: CPT | Mod: CPTII,,, | Performed by: ORTHOPAEDIC SURGERY

## 2023-08-29 PROCEDURE — 3008F PR BODY MASS INDEX (BMI) DOCUMENTED: ICD-10-PCS | Mod: CPTII,,, | Performed by: ORTHOPAEDIC SURGERY

## 2023-08-29 PROCEDURE — 1159F MED LIST DOCD IN RCRD: CPT | Mod: CPTII,,, | Performed by: ORTHOPAEDIC SURGERY

## 2023-08-29 PROCEDURE — 3008F BODY MASS INDEX DOCD: CPT | Mod: CPTII,,, | Performed by: ORTHOPAEDIC SURGERY

## 2023-08-29 NOTE — PROGRESS NOTES
Chief Complaint:   Chief Complaint   Patient presents with    Left Hip - Follow-up    Follow-up     Pt states that she is still experiencing pain in the groin. Pt states she is taking Tramadol to manage her discomfort. Pain is intermittent.       History of present illness:  43-year-old female presents today for follow-up of revision of the left hip.  Patient is right at a year out.  Overall she is doing well.  She needs to have some pain to the anterior groin.  It is worse with activity.  Improved by rest.    Past Medical History:   Diagnosis Date    ARDS (adult respiratory distress syndrome)     Arthritis     Thyroid disease        Past Surgical History:   Procedure Laterality Date    ADENOIDECTOMY      ARTHROCENTESIS OF HIP JOINT Left 2022    Procedure: ARTHROCENTESIS, HIP aspiration under fluroscopy;  Surgeon: Michel Peña MD;  Location: [facility] OR;  Service: Orthopedics;  Laterality: Left;     SECTION      x 2     SECTION   &    CHOLECYSTECTOMY      INSERTION OF PERCUTANEOUS ENDOSCOPIC GASTROSTOMY (PEG) FEEDING TUBE      JOINT REPLACEMENT      KNEE ARTHROSCOPY Right     LAPAROSCOPIC CHOLECYSTECTOMY      ORIF HIP FRACTURE Left     X2    PEG TUBE REMOVAL      REMOVAL OF TRACHEOSTOMY TUBE      REVISION TOTAL HIP ARTHROPLASTY Left 08/15/2022    Procedure: REVISION, TOTAL ARTHROPLASTY, HIP femur / cell saver / chetan / rest mod vs insignia / pathology;  Surgeon: Michel Peña MD;  Location: [facility] OR;  Service: Orthopedics;  Laterality: Left;    SINUS SURGERY      x2    TONSILLECTOMY      TOTAL HIP ARTHROPLASTY Left     TRACHEOTOMY         Current Outpatient Medications   Medication Sig    albuterol (PROVENTIL) 2.5 mg /3 mL (0.083 %) nebulizer solution Inhale 1 mL into the lungs daily as needed.    ALBUTEROL INHL Inhale 1 puff into the lungs daily as needed.    b complex vitamins capsule Take 1 capsule by mouth once daily.    b complex vitamins tablet vitamin B  complex    biotin 10 mg Tab biotin    calcium carbonate (OS-LOGAN) 600 mg calcium (1,500 mg) Tab     cholecalciferol, vitamin D3, 125 mcg (5,000 unit) Tab Take by mouth.    clonazePAM (KLONOPIN) 0.5 MG tablet Take 0.5 mg by mouth 2 (two) times daily as needed.    fluticasone furoate-vilanteroL (BREO ELLIPTA) 100-25 mcg/dose diskus inhaler Inhale 1 puff into the lungs daily as needed.    fluticasone furoate-vilanteroL (BREO ELLIPTA) 100-25 mcg/dose diskus inhaler Inhale 1 puff into the lungs once daily. Controller    fluticasone propionate (FLOVENT HFA) 110 mcg/actuation inhaler Inhale 1 puff into the lungs 2 (two) times daily. Controller    levothyroxine (SYNTHROID) 88 MCG tablet Take 88 mcg by mouth before breakfast.    meloxicam (MOBIC) 15 MG tablet Take 1 tablet (15 mg total) by mouth once daily.    multivitamin with minerals tablet Take 1 tablet by mouth once daily at 6am.    traMADoL (ULTRAM) 50 mg tablet Take 1 tablet by mouth every 6 (six) hours as needed.    ascorbic acid, vitamin C, (VITAMIN C) 500 MG tablet Take 500 mg by mouth.    aspirin (ECOTRIN) 81 MG EC tablet Take 1 tablet (81 mg total) by mouth every 12 (twelve) hours.    collagenase Clostridium hist. (COLLAGENASE CLOS HIST., BULK,) Powd 1 Scoop by Other route daily as needed.    gabapentin (NEURONTIN) 300 MG capsule Take 1 capsule (300 mg total) by mouth 2 (two) times daily. (Patient not taking: Reported on 8/19/2022)    meloxicam (MOBIC) 15 MG tablet Take 1 tablet (15 mg total) by mouth once daily.    multivitamin-minerals-lutein (MULTIVITAMIN 50 PLUS) Tab multivitamin    nitrofurantoin, macrocrystal-monohydrate, (MACROBID) 100 MG capsule Take 100 mg by mouth 2 (two) times daily.    predniSONE (DELTASONE) 10 MG tablet Take 3 tabs po daily for 3 days, then decrease to 3 tab po daily for 3 days then stop     Current Facility-Administered Medications   Medication    albuterol sulfate nebulizer solution 2.5 mg       Review of patient's allergies  indicates:   Allergen Reactions    Tizanidine Rash       Family History   Problem Relation Age of Onset    Asthma Mother     Hearing loss Mother     Miscarriages / Stillbirths Mother     Diabetes Maternal Grandfather     Cancer Maternal Grandmother         Breast    Hearing loss Maternal Grandmother     Heart disease Maternal Grandmother     Stroke Maternal Grandmother        Social History     Socioeconomic History    Marital status: Single   Tobacco Use    Smoking status: Former     Current packs/day: 0.50     Average packs/day: 0.5 packs/day for 24.0 years (12.0 ttl pk-yrs)     Types: Cigarettes    Smokeless tobacco: Never    Tobacco comments:     Pt quit smoking about year in half the patient smoked for total of of 24 years. Pt used to smoke about .5PPD.   Substance and Sexual Activity    Alcohol use: Yes     Alcohol/week: 1.0 standard drink of alcohol     Types: 1 Glasses of wine per week     Comment: occas    Drug use: Never    Sexual activity: Yes     Partners: Male     Birth control/protection: None     Comment:  had vasectomy     Social Determinants of Health     Financial Resource Strain: Low Risk  (8/15/2022)    Overall Financial Resource Strain (CARDIA)     Difficulty of Paying Living Expenses: Not hard at all   Food Insecurity: No Food Insecurity (8/15/2022)    Hunger Vital Sign     Worried About Running Out of Food in the Last Year: Never true     Ran Out of Food in the Last Year: Never true   Transportation Needs: No Transportation Needs (8/15/2022)    PRAPARE - Transportation     Lack of Transportation (Medical): No     Lack of Transportation (Non-Medical): No   Physical Activity: Inactive (8/15/2022)    Exercise Vital Sign     Days of Exercise per Week: 0 days     Minutes of Exercise per Session: 0 min   Stress: No Stress Concern Present (8/15/2022)    [employer] - Occupational Stress Questionnaire     Feeling of Stress : Only a little   Social Connections:  Unknown (8/15/2022)    Social Connection and Isolation Panel [NHANES]     Frequency of Communication with Friends and Family: More than three times a week     Frequency of Social Gatherings with Friends and Family: Patient refused     Active Member of Clubs or Organizations: No     Attends Club or Organization Meetings: Never     Marital Status: Living with partner   Housing Stability: Low Risk  (8/15/2022)    Housing Stability Vital Sign     Unable to Pay for Housing in the Last Year: No     Number of Places Lived in the Last Year: 1     Unstable Housing in the Last Year: No           Review of Systems:    Constitution: Negative for chills, fever, and sweats.  Negative for unexplained weight loss.    HENT:  Negative for headaches and blurry vision.    Cardiovascular:Negative for chest pain or irregular heart beat. Negative for hypertension.    Respiratory:  Negative for cough and shortness of breath.    Gastrointestinal: Negative for abdominal pain, heartburn, melena, nausea, and vomitting.    Genitourinary:  Negative bladder incontinence and dysuria.    Musculoskeletal:  See HPI    Neurological: Negative for numbness.    Psychiatric/Behavioral: Negative for depression.  The patient is not nervous/anxious.      Endocrine: Negative for polyuria    Hematologic/Lymphatic: Negative for bleeding problem.  Does not bruise/bleed easily.    Skin: Negative for poor would healing and rash      Physical Examination:    Vital Signs:    Vitals:    08/29/23 1301   BP: 105/72   Pulse: 73       Body mass index is 38.73 kg/m².    General: No acute distress, alert and oriented, healthy appearing    HEENT: Head is atraumatic, mucous membranes are moist    Neck: Supples, no JVD    Cardiovascular: Palpable dorsalis pedis and posterior tibial pulses, regular rate and rhythm to those pulses    Lungs: Breathing non-labored    Skin: no rashes appreciated    Neurologic: Can flex and extend knees, ankles, and toes. Sensation is grossly  intact    Left hip:  Range of motion left hip with pain.  Patient does have some tenderness over her groin over iliopsoas tendon.    X-rays:  Three views left hip taken and reviewed.  Patient's implants appear well fixed.  No signs of loosening or subsidence noted     Assessment::  Left hip pain status post hip arthroplasty with iliopsoas tendinitis    Plan:  At this point she continues to have pain.  Think some of this may be impingement versus tendinitis.  We will get a mars MRI of her left hip to evaluate this.    This note was created using [employer] voice recognition software that occasionally misinterpreted phrases or words.    Consult note is delivered via Epic messaging service.

## 2023-09-22 ENCOUNTER — OFFICE VISIT (OUTPATIENT)
Dept: ORTHOPEDICS | Facility: CLINIC | Age: 43
End: 2023-09-22
Payer: COMMERCIAL

## 2023-09-22 VITALS
DIASTOLIC BLOOD PRESSURE: 73 MMHG | HEIGHT: 61 IN | HEART RATE: 93 BPM | WEIGHT: 206 LBS | SYSTOLIC BLOOD PRESSURE: 113 MMHG | BODY MASS INDEX: 38.89 KG/M2

## 2023-09-22 DIAGNOSIS — Z96.642 AFTERCARE FOLLOWING LEFT HIP JOINT REPLACEMENT SURGERY: Primary | ICD-10-CM

## 2023-09-22 DIAGNOSIS — Z47.1 AFTERCARE FOLLOWING LEFT HIP JOINT REPLACEMENT SURGERY: Primary | ICD-10-CM

## 2023-09-22 PROCEDURE — 3008F PR BODY MASS INDEX (BMI) DOCUMENTED: ICD-10-PCS | Mod: CPTII,,, | Performed by: ORTHOPAEDIC SURGERY

## 2023-09-22 PROCEDURE — 1159F MED LIST DOCD IN RCRD: CPT | Mod: CPTII,,, | Performed by: ORTHOPAEDIC SURGERY

## 2023-09-22 PROCEDURE — 3074F SYST BP LT 130 MM HG: CPT | Mod: CPTII,,, | Performed by: ORTHOPAEDIC SURGERY

## 2023-09-22 PROCEDURE — 99213 OFFICE O/P EST LOW 20 MIN: CPT | Mod: ,,, | Performed by: ORTHOPAEDIC SURGERY

## 2023-09-22 PROCEDURE — 3074F PR MOST RECENT SYSTOLIC BLOOD PRESSURE < 130 MM HG: ICD-10-PCS | Mod: CPTII,,, | Performed by: ORTHOPAEDIC SURGERY

## 2023-09-22 PROCEDURE — 1159F PR MEDICATION LIST DOCUMENTED IN MEDICAL RECORD: ICD-10-PCS | Mod: CPTII,,, | Performed by: ORTHOPAEDIC SURGERY

## 2023-09-22 PROCEDURE — 3078F DIAST BP <80 MM HG: CPT | Mod: CPTII,,, | Performed by: ORTHOPAEDIC SURGERY

## 2023-09-22 PROCEDURE — 3008F BODY MASS INDEX DOCD: CPT | Mod: CPTII,,, | Performed by: ORTHOPAEDIC SURGERY

## 2023-09-22 PROCEDURE — 3078F PR MOST RECENT DIASTOLIC BLOOD PRESSURE < 80 MM HG: ICD-10-PCS | Mod: CPTII,,, | Performed by: ORTHOPAEDIC SURGERY

## 2023-09-22 PROCEDURE — 99213 PR OFFICE/OUTPT VISIT, EST, LEVL III, 20-29 MIN: ICD-10-PCS | Mod: ,,, | Performed by: ORTHOPAEDIC SURGERY

## 2023-09-22 RX ORDER — CELECOXIB 200 MG/1
200 CAPSULE ORAL 2 TIMES DAILY
Qty: 60 CAPSULE | Refills: 2 | Status: SHIPPED | OUTPATIENT
Start: 2023-09-22 | End: 2023-12-25 | Stop reason: SDUPTHER

## 2023-09-22 NOTE — PROGRESS NOTES
Chief Complaint:   Chief Complaint   Patient presents with    Results     F/u MRI results [facility] on 8/15/22. Patient is having some pain on today.       History of present illness:  43-year-old female presents today for follow-up of her revision of the right hip.  Patient overall is doing better than what she was at last visit.  She got a steroid injection for some sinusitis and said this really improved her pain.    Past Medical History:   Diagnosis Date    ARDS (adult respiratory distress syndrome)     Arthritis     Thyroid disease        Past Surgical History:   Procedure Laterality Date    ADENOIDECTOMY  1986    ARTHROCENTESIS OF HIP JOINT Left 2022    Procedure: ARTHROCENTESIS, HIP aspiration under fluroscopy;  Surgeon: Michel Peña MD;  Location: [facility] OR;  Service: Orthopedics;  Laterality: Left;     SECTION      x 2     SECTION   &    CHOLECYSTECTOMY      INSERTION OF PERCUTANEOUS ENDOSCOPIC GASTROSTOMY (PEG) FEEDING TUBE      JOINT REPLACEMENT      KNEE ARTHROSCOPY Right     LAPAROSCOPIC CHOLECYSTECTOMY      ORIF HIP FRACTURE Left     X2    PEG TUBE REMOVAL      REMOVAL OF TRACHEOSTOMY TUBE      REVISION TOTAL HIP ARTHROPLASTY Left 08/15/2022    Procedure: REVISION, TOTAL ARTHROPLASTY, HIP femur / cell saver / chetan / rest mod vs insignia / pathology;  Surgeon: Michel Peña MD;  Location: [facility] OR;  Service: Orthopedics;  Laterality: Left;    SINUS SURGERY      x2    TONSILLECTOMY  1986    TOTAL HIP ARTHROPLASTY Left     TRACHEOTOMY         Current Outpatient Medications   Medication Sig    albuterol (PROVENTIL) 2.5 mg /3 mL (0.083 %) nebulizer solution Inhale 1 mL into the lungs daily as needed.    ALBUTEROL INHL Inhale 1 puff into the lungs daily as needed.    b complex vitamins capsule Take 1 capsule by mouth once daily.    b complex vitamins tablet vitamin B complex    biotin 10 mg Tab biotin    budesonide-formoterol 160-4.5 mcg (SYMBICORT) 160-4.5  mcg/actuation HFAA Inhale 2 puffs into the lungs every 12 (twelve) hours. Controller    calcium carbonate (OS-LOGAN) 600 mg calcium (1,500 mg) Tab     cholecalciferol, vitamin D3, 125 mcg (5,000 unit) Tab Take by mouth.    clonazePAM (KLONOPIN) 0.5 MG tablet Take 0.5 mg by mouth 2 (two) times daily as needed.    ascorbic acid, vitamin C, (VITAMIN C) 500 MG tablet Take 500 mg by mouth.    aspirin (ECOTRIN) 81 MG EC tablet Take 1 tablet (81 mg total) by mouth every 12 (twelve) hours.    fluticasone furoate-vilanteroL (BREO ELLIPTA) 100-25 mcg/dose diskus inhaler Inhale 1 puff into the lungs once daily. Controller    levothyroxine (SYNTHROID) 88 MCG tablet Take 88 mcg by mouth before breakfast.    meloxicam (MOBIC) 15 MG tablet Take 1 tablet (15 mg total) by mouth once daily.    meloxicam (MOBIC) 15 MG tablet Take 1 tablet (15 mg total) by mouth once daily.    multivitamin with minerals tablet Take 1 tablet by mouth once daily at 6am.    traMADoL (ULTRAM) 50 mg tablet Take 1 tablet by mouth every 6 (six) hours as needed.     Current Facility-Administered Medications   Medication    albuterol sulfate nebulizer solution 2.5 mg       Review of patient's allergies indicates:   Allergen Reactions    Tizanidine Rash       Family History   Problem Relation Age of Onset    Asthma Mother     Hearing loss Mother     Miscarriages / Stillbirths Mother     Diabetes Maternal Grandfather     Cancer Maternal Grandmother         Breast    Hearing loss Maternal Grandmother     Heart disease Maternal Grandmother     Stroke Maternal Grandmother        Social History     Socioeconomic History    Marital status: Single   Tobacco Use    Smoking status: Former     Current packs/day: 0.50     Average packs/day: 0.5 packs/day for 24.0 years (12.0 ttl pk-yrs)     Types: Cigarettes    Smokeless tobacco: Never    Tobacco comments:     Pt quit smoking about year in half the patient smoked for total of of 24 years. Pt used to smoke about .5PPD.    Substance and Sexual Activity    Alcohol use: Yes     Alcohol/week: 1.0 standard drink of alcohol     Types: 1 Glasses of wine per week     Comment: occas    Drug use: Never    Sexual activity: Yes     Partners: Male     Birth control/protection: None     Comment:  had vasectomy     Social Determinants of Health     Financial Resource Strain: Low Risk  (9/10/2023)    Overall Financial Resource Strain (CARDIA)     Difficulty of Paying Living Expenses: Not hard at all   Food Insecurity: No Food Insecurity (9/10/2023)    Hunger Vital Sign     Worried About Running Out of Food in the Last Year: Never true     Ran Out of Food in the Last Year: Never true   Transportation Needs: No Transportation Needs (9/10/2023)    PRAPARE - Transportation     Lack of Transportation (Medical): No     Lack of Transportation (Non-Medical): No   Physical Activity: Unknown (9/10/2023)    Exercise Vital Sign     Days of Exercise per Week: 0 days   Stress: Stress Concern Present (9/10/2023)    [employer] [place] [employer] - Occupational Stress Questionnaire     Feeling of Stress : To some extent   Social Connections: Unknown (9/10/2023)    Social Connection and Isolation Panel [NHANES]     Frequency of Communication with Friends and Family: Three times a week     Frequency of Social Gatherings with Friends and Family: Once a week     Active Member of Clubs or Organizations: No     Marital Status:    Housing Stability: Low Risk  (9/10/2023)    Housing Stability Vital Sign     Unable to Pay for Housing in the Last Year: No     Number of Places Lived in the Last Year: 1     Unstable Housing in the Last Year: No           Review of Systems:    Constitution: Negative for chills, fever, and sweats.  Negative for unexplained weight loss.    HENT:  Negative for headaches and blurry vision.    Cardiovascular:Negative for chest pain or irregular heart beat. Negative for hypertension.    Respiratory:  Negative for cough and  shortness of breath.    Gastrointestinal: Negative for abdominal pain, heartburn, melena, nausea, and vomitting.    Genitourinary:  Negative bladder incontinence and dysuria.    Musculoskeletal:  See HPI    Neurological: Negative for numbness.    Psychiatric/Behavioral: Negative for depression.  The patient is not nervous/anxious.      Endocrine: Negative for polyuria    Hematologic/Lymphatic: Negative for bleeding problem.  Does not bruise/bleed easily.    Skin: Negative for poor would healing and rash      Physical Examination:    Vital Signs:    Vitals:    09/22/23 0841   BP: 113/73   Pulse: 93       Body mass index is 38.92 kg/m².    General: No acute distress, alert and oriented, healthy appearing    HEENT: Head is atraumatic, mucous membranes are moist    Neck: Supples, no JVD    Cardiovascular: Palpable dorsalis pedis and posterior tibial pulses, regular rate and rhythm to those pulses    Lungs: Breathing non-labored    Skin: no rashes appreciated    Neurologic: Can flex and extend knees, ankles, and toes. Sensation is grossly intact    Right hip:  Range of motion of the right hip without significant pain or discomfort today.  Patient can forward flex to 90    X-rays:      Assessment::  Status post revision right total hip arthroplasty    Plan:  MRI completely normal.  We discussed all treatment options today.  We will try a different anti-inflammatories this has worked we will try a Medrol Dosepak given her significant improvement from a steroid standpoint.  Likely just some inflammatory changes in the hip.  We will continue to monitor this in 3 months.  Repeat x-rays in 3 months.    This note was created using [employer] voice recognition software that occasionally misinterpreted phrases or words.    Consult note is delivered via Epic messaging service.

## 2023-12-21 ENCOUNTER — HOSPITAL ENCOUNTER (OUTPATIENT)
Dept: RADIOLOGY | Facility: CLINIC | Age: 43
Discharge: HOME | End: 2023-12-21
Attending: ORTHOPAEDIC SURGERY
Payer: COMMERCIAL

## 2023-12-21 ENCOUNTER — OFFICE VISIT (OUTPATIENT)
Dept: ORTHOPEDICS | Facility: CLINIC | Age: 43
End: 2023-12-21
Payer: COMMERCIAL

## 2023-12-21 VITALS — HEIGHT: 61 IN | BODY MASS INDEX: 37.54 KG/M2 | WEIGHT: 198.81 LBS

## 2023-12-21 DIAGNOSIS — Z47.1 AFTERCARE FOLLOWING LEFT HIP JOINT REPLACEMENT SURGERY: ICD-10-CM

## 2023-12-21 DIAGNOSIS — Z96.642 AFTERCARE FOLLOWING LEFT HIP JOINT REPLACEMENT SURGERY: ICD-10-CM

## 2023-12-21 DIAGNOSIS — Z96.642 AFTERCARE FOLLOWING LEFT HIP JOINT REPLACEMENT SURGERY: Primary | ICD-10-CM

## 2023-12-21 DIAGNOSIS — Z47.1 AFTERCARE FOLLOWING LEFT HIP JOINT REPLACEMENT SURGERY: Primary | ICD-10-CM

## 2023-12-21 PROCEDURE — 73502 XR HIP WITH PELVIS WHEN PERFORMED, 2 OR 3 VIEWS LEFT: ICD-10-PCS | Mod: LT,,, | Performed by: ORTHOPAEDIC SURGERY

## 2023-12-21 PROCEDURE — 3008F BODY MASS INDEX DOCD: CPT | Mod: CPTII,,, | Performed by: ORTHOPAEDIC SURGERY

## 2023-12-21 PROCEDURE — 3044F PR MOST RECENT HEMOGLOBIN A1C LEVEL <7.0%: ICD-10-PCS | Mod: CPTII,,, | Performed by: ORTHOPAEDIC SURGERY

## 2023-12-21 PROCEDURE — 1159F PR MEDICATION LIST DOCUMENTED IN MEDICAL RECORD: ICD-10-PCS | Mod: CPTII,,, | Performed by: ORTHOPAEDIC SURGERY

## 2023-12-21 PROCEDURE — 3008F PR BODY MASS INDEX (BMI) DOCUMENTED: ICD-10-PCS | Mod: CPTII,,, | Performed by: ORTHOPAEDIC SURGERY

## 2023-12-21 PROCEDURE — 99213 OFFICE O/P EST LOW 20 MIN: CPT | Mod: ,,, | Performed by: ORTHOPAEDIC SURGERY

## 2023-12-21 PROCEDURE — 1159F MED LIST DOCD IN RCRD: CPT | Mod: CPTII,,, | Performed by: ORTHOPAEDIC SURGERY

## 2023-12-21 PROCEDURE — 3044F HG A1C LEVEL LT 7.0%: CPT | Mod: CPTII,,, | Performed by: ORTHOPAEDIC SURGERY

## 2023-12-21 PROCEDURE — 99213 PR OFFICE/OUTPT VISIT, EST, LEVL III, 20-29 MIN: ICD-10-PCS | Mod: ,,, | Performed by: ORTHOPAEDIC SURGERY

## 2023-12-21 PROCEDURE — 73502 X-RAY EXAM HIP UNI 2-3 VIEWS: CPT | Mod: LT,,, | Performed by: ORTHOPAEDIC SURGERY

## 2023-12-21 RX ORDER — LEVOTHYROXINE SODIUM 100 UG/1
112 TABLET ORAL
COMMUNITY

## 2023-12-21 NOTE — PROGRESS NOTES
Chief Complaint:   Chief Complaint   Patient presents with    Left Hip - Follow-up     Pt states she is doing great. Pt is doing massage therapy with some mild relief. Pt reports a mild tightness which is intermittent, but is bearable. Pt has some difficulty to move or walk in cold weather.       History of present illness:  43-year-old female presents today for follow-up of revision of her left hip.  Overall she is doing fairly well.  Pain is controlled.  He is happy with her recovery.  No complaints today    Past Medical History:   Diagnosis Date    ARDS (adult respiratory distress syndrome)     Arthritis     Thyroid disease        Past Surgical History:   Procedure Laterality Date    ADENOIDECTOMY  1986    ARTHROCENTESIS OF HIP JOINT Left 2022    Procedure: ARTHROCENTESIS, HIP aspiration under fluroscopy;  Surgeon: Michel Peña MD;  Location: [facility] OR;  Service: Orthopedics;  Laterality: Left;     SECTION      x 2     SECTION   &    CHOLECYSTECTOMY      INSERTION OF PERCUTANEOUS ENDOSCOPIC GASTROSTOMY (PEG) FEEDING TUBE      JOINT REPLACEMENT      KNEE ARTHROSCOPY Right     LAPAROSCOPIC CHOLECYSTECTOMY      ORIF HIP FRACTURE Left     X2    PEG TUBE REMOVAL      REMOVAL OF TRACHEOSTOMY TUBE      REVISION TOTAL HIP ARTHROPLASTY Left 08/15/2022    Procedure: REVISION, TOTAL ARTHROPLASTY, HIP femur / cell saver / chetan / rest mod vs insignia / pathology;  Surgeon: Michel Peña MD;  Location: [facility] OR;  Service: Orthopedics;  Laterality: Left;    SINUS SURGERY      x2    TONSILLECTOMY  1986    TOTAL HIP ARTHROPLASTY Left     TRACHEOTOMY         Current Outpatient Medications   Medication Sig    levothyroxine (SYNTHROID) 100 MCG tablet Take 100 mcg by mouth before breakfast.    albuterol (PROVENTIL) 2.5 mg /3 mL (0.083 %) nebulizer solution Inhale 1 mL into the lungs daily as needed.    ALBUTEROL INHL Inhale 1 puff into the lungs daily as needed.    azelastine (ASTELIN)  137 mcg (0.1 %) nasal spray 1 spray (137 mcg total) by Nasal route 2 (two) times daily.    b complex vitamins tablet vitamin B complex    biotin 10 mg Tab biotin    budesonide-formoterol 160-4.5 mcg (SYMBICORT) 160-4.5 mcg/actuation HFAA Inhale 2 puffs into the lungs every 12 (twelve) hours. Controller    calcium carbonate (OS-LOGAN) 600 mg calcium (1,500 mg) Tab     celecoxib (CELEBREX) 200 MG capsule Take 1 capsule (200 mg total) by mouth 2 (two) times daily. (Patient not taking: Reported on 12/21/2023)    cholecalciferol, vitamin D3, 125 mcg (5,000 unit) Tab Take by mouth.    clonazePAM (KLONOPIN) 0.5 MG tablet Take 0.5 mg by mouth 2 (two) times daily as needed.    levothyroxine (SYNTHROID) 88 MCG tablet Take 88 mcg by mouth before breakfast.    meloxicam (MOBIC) 15 MG tablet Take 1 tablet (15 mg total) by mouth once daily.    meloxicam (MOBIC) 15 MG tablet Take 1 tablet (15 mg total) by mouth once daily.    multivitamin with minerals tablet Take 1 tablet by mouth once daily at 6am.     Current Facility-Administered Medications   Medication    albuterol sulfate nebulizer solution 2.5 mg       Review of patient's allergies indicates:   Allergen Reactions    Tizanidine Rash       Family History   Problem Relation Age of Onset    Asthma Mother     Hearing loss Mother     Miscarriages / Stillbirths Mother     Diabetes Maternal Grandfather     Cancer Maternal Grandmother         Breast    Hearing loss Maternal Grandmother     Heart disease Maternal Grandmother     Stroke Maternal Grandmother        Social History     Socioeconomic History    Marital status: Single   Tobacco Use    Smoking status: Former     Current packs/day: 0.50     Average packs/day: 0.5 packs/day for 24.0 years (12.0 ttl pk-yrs)     Types: Cigarettes    Smokeless tobacco: Never    Tobacco comments:     Pt quit smoking about year in half the patient smoked for total of of 24 years. Pt used to smoke about .5PPD.   Substance and Sexual Activity     Alcohol use: Yes     Alcohol/week: 1.0 standard drink of alcohol     Types: 1 Glasses of wine per week     Comment: occas    Drug use: Never    Sexual activity: Yes     Partners: Male     Birth control/protection: None     Comment:  had vasectomy     Social Determinants of Health     Financial Resource Strain: Low Risk  (12/12/2023)    Overall Financial Resource Strain (CARDIA)     Difficulty of Paying Living Expenses: Not hard at all   Food Insecurity: No Food Insecurity (12/12/2023)    Hunger Vital Sign     Worried About Running Out of Food in the Last Year: Never true     Ran Out of Food in the Last Year: Never true   Transportation Needs: No Transportation Needs (12/12/2023)    PRAPARE - Transportation     Lack of Transportation (Medical): No     Lack of Transportation (Non-Medical): No   Physical Activity: Unknown (12/12/2023)    Exercise Vital Sign     Days of Exercise per Week: 0 days   Stress: Stress Concern Present (12/12/2023)    [employer] [place] [employer] - Occupational Stress Questionnaire     Feeling of Stress : To some extent   Social Connections: Unknown (12/12/2023)    Social Connection and Isolation Panel [NHANES]     Frequency of Communication with Friends and Family: More than three times a week     Frequency of Social Gatherings with Friends and Family: Once a week     Active Member of Clubs or Organizations: No     Marital Status:    Housing Stability: Low Risk  (12/12/2023)    Housing Stability Vital Sign     Unable to Pay for Housing in the Last Year: No     Number of Places Lived in the Last Year: 1     Unstable Housing in the Last Year: No           Review of Systems:    Constitution: Negative for chills, fever, and sweats.  Negative for unexplained weight loss.    HENT:  Negative for headaches and blurry vision.    Cardiovascular:Negative for chest pain or irregular heart beat. Negative for hypertension.    Respiratory:  Negative for cough and shortness of  breath.    Gastrointestinal: Negative for abdominal pain, heartburn, melena, nausea, and vomitting.    Genitourinary:  Negative bladder incontinence and dysuria.    Musculoskeletal:  See HPI    Neurological: Negative for numbness.    Psychiatric/Behavioral: Negative for depression.  The patient is not nervous/anxious.      Endocrine: Negative for polyuria    Hematologic/Lymphatic: Negative for bleeding problem.  Does not bruise/bleed easily.    Skin: Negative for poor would healing and rash      Physical Examination:    Vital Signs:  There were no vitals filed for this visit.    Body mass index is 37.56 kg/m².    General: No acute distress, alert and oriented, healthy appearing    HEENT: Head is atraumatic, mucous membranes are moist    Neck: Supples, no JVD    Cardiovascular: Palpable dorsalis pedis and posterior tibial pulses, regular rate and rhythm to those pulses    Lungs: Breathing non-labored    Skin: no rashes appreciated    Neurologic: Can flex and extend knees, ankles, and toes. Sensation is grossly intact    Left hip:  Range of motion left hip without significant pain or discomfort.  Brisk cap refill distally.  Sensation intact distally.    X-rays:  Three views left hip reviewed.  Patient's implants well fixed.  No signs of loosening or subsidence noted     Assessment::  Status post left total hip arthroplasty    Plan:  Patient overall doing quite well.  Pain is well-controlled.  Happy with her recovery.  Plan to see her back in 9 months with repeat x-rays of the left hip.    This note was created using [employer] voice recognition software that occasionally misinterpreted phrases or words.    Consult note is delivered via Epic messaging service.

## 2023-12-25 DIAGNOSIS — Z47.1 AFTERCARE FOLLOWING LEFT HIP JOINT REPLACEMENT SURGERY: ICD-10-CM

## 2023-12-25 DIAGNOSIS — Z96.642 AFTERCARE FOLLOWING LEFT HIP JOINT REPLACEMENT SURGERY: ICD-10-CM

## 2023-12-26 RX ORDER — CELECOXIB 200 MG/1
200 CAPSULE ORAL 2 TIMES DAILY
Qty: 60 CAPSULE | Refills: 2 | Status: SHIPPED | OUTPATIENT
Start: 2023-12-26 | End: 2024-03-27 | Stop reason: SDUPTHER

## 2024-02-12 NOTE — PROGRESS NOTES
Past Medical History:   Diagnosis Date    ARDS (adult respiratory distress syndrome)     Arthritis     Thyroid disease        Past Surgical History:   Procedure Laterality Date    ARTHROCENTESIS OF HIP JOINT Left 2022    Procedure: ARTHROCENTESIS, HIP aspiration under fluroscopy;  Surgeon: Michel Peña MD;  Location: [facility];  Service: Orthopedics;  Laterality: Left;     SECTION      x 2    INSERTION OF PERCUTANEOUS ENDOSCOPIC GASTROSTOMY (PEG) FEEDING TUBE      KNEE ARTHROSCOPY Right     LAPAROSCOPIC CHOLECYSTECTOMY      ORIF HIP FRACTURE Left     X2    PEG TUBE REMOVAL      REMOVAL OF TRACHEOSTOMY TUBE      SINUS SURGERY      x2    TOTAL HIP ARTHROPLASTY Left     TRACHEOTOMY         Current Outpatient Medications   Medication Sig    albuterol (PROVENTIL) 2.5 mg /3 mL (0.083 %) nebulizer solution Inhale 1 mL into the lungs daily as needed.    ALBUTEROL INHL Inhale 1 puff into the lungs daily as needed.    ascorbic acid, vitamin C, (VITAMIN C) 500 MG tablet Take 500 mg by mouth.    b complex vitamins capsule Take 1 capsule by mouth once daily.    calcium carbonate (OS-LOGAN) 600 mg calcium (1,500 mg) Tab     clonazePAM (KLONOPIN) 0.5 MG tablet Take 0.5 mg by mouth 2 (two) times daily as needed.    collagenase Clostridium hist. (COLLAGENASE CLOS HIST., BULK,) Powd 1 Scoop by Other route daily as needed.    fluticasone furoate-vilanteroL (BREO ELLIPTA) 100-25 mcg/dose diskus inhaler Inhale 1 puff into the lungs daily as needed.    levothyroxine (SYNTHROID) 88 MCG tablet Take 1 tablet by mouth every morning.    multivitamin with minerals tablet Take 1 tablet by mouth once daily at 6am.    fluticasone propionate (FLOVENT HFA) 110 mcg/actuation inhaler Inhale 2 puffs into the lungs daily as needed.    gabapentin (NEURONTIN) 300 MG capsule Take 300 mg by mouth 2 (two) times daily.    meloxicam (MOBIC) 15 MG tablet Take 15 mg by mouth once daily at 6am.    meloxicam (MOBIC) 15 MG  tablet Take 1 tablet (15 mg total) by mouth once daily. (Patient not taking: Reported on 6/14/2022)    meloxicam (MOBIC) 15 MG tablet Take 1 tablet (15 mg total) by mouth once daily. (Patient not taking: Reported on 6/14/2022)    nitrofurantoin, macrocrystal-monohydrate, (MACROBID) 100 MG capsule Take 100 mg by mouth 2 (two) times daily.    traMADoL (ULTRAM) 50 mg tablet Take 1 tablet (50 mg total) by mouth every 6 (six) hours. (Patient not taking: Reported on 6/14/2022)     No current facility-administered medications for this visit.       Review of patient's allergies indicates:   Allergen Reactions    Tizanidine Rash       Family History   Family history unknown: Yes       Social History     Socioeconomic History    Marital status: Single   Tobacco Use    Smoking status: Former Smoker     Types: Cigarettes    Smokeless tobacco: Never Used   Substance and Sexual Activity    Alcohol use: Yes     Alcohol/week: 1.0 standard drink     Types: 1 Glasses of wine per week    Drug use: Never    Sexual activity: Yes       Chief Complaint:   Chief Complaint   Patient presents with    Hip Pain     Left hip pain, pain is constant for about two weeks, Discuss Bone Scan Results       History of present illness:  41-year-old female presents today for follow-up of painful total hip on the left side.  She continues to have pain left hip and groin as well as radiating down her leg.  Patient is here today to go over her bone scan results.      Review of Systems:    Constitution: Negative for chills, fever, and sweats.  Negative for unexplained weight loss.    HENT:  Negative for headaches and blurry vision.    Cardiovascular:Negative for chest pain or irregular heart beat. Negative for hypertension.    Respiratory:  Negative for cough and shortness of breath.    Gastrointestinal: Negative for abdominal pain, heartburn, melena, nausea, and vomitting.    Genitourinary:  Negative bladder incontinence and  dysuria.    Musculoskeletal:  See HPI    Neurological: Negative for numbness.    Psychiatric/Behavioral: Negative for depression.  The patient is not nervous/anxious.      Endocrine: Negative for polyuria    Hematologic/Lymphatic: Negative for bleeding problem.  Does not bruise/bleed easily.    Skin: Negative for poor would healing and rash      Physical Examination:    Vital Signs:    Vitals:    06/14/22 0950   BP: 116/75   Pulse: 82   Resp: 20       Body mass index is 39.68 kg/m².    General: No acute distress, alert and oriented, healthy appearing    HEENT: Head is atraumatic, mucous membranes are moist    Neck: Supples, no JVD    Cardiovascular: Palpable dorsalis pedis and posterior tibial pulses, regular rate and rhythm to those pulses    Lungs: Breathing non-labored    Skin: no rashes appreciated    Neurologic: Can flex and extend knees, ankles, and toes. Sensation is grossly intact      Left hip:  Patient has significant pain with range of motion left hip.  She has internal and external rotation that causes her discomfort and pain.  Positive Stinchfield.    X-rays:  Bone scan reviewed.  Patient does have increased uptake around femoral component.  Small amount of increased uptake around her acetabulum as well     Assessment::  Painful total hip on the left side.  Left trochanteric bursitis    Plan:  Discussed all treatment options the patient.  We given injection to her trochanteric bursa today.  We discussed also revising the femoral component for lengthening.  She has 1 consider her options.  She will contact us if she wishes to proceed with revision.    This note was created using [employer] voice recognition software that occasionally misinterpreted phrases or words.    Consult note is delivered via Epic messaging service.       Statement Selected

## 2024-03-27 DIAGNOSIS — Z96.642 AFTERCARE FOLLOWING LEFT HIP JOINT REPLACEMENT SURGERY: ICD-10-CM

## 2024-03-27 DIAGNOSIS — Z47.1 AFTERCARE FOLLOWING LEFT HIP JOINT REPLACEMENT SURGERY: ICD-10-CM

## 2024-03-27 RX ORDER — CELECOXIB 200 MG/1
200 CAPSULE ORAL 2 TIMES DAILY
Qty: 60 CAPSULE | Refills: 2 | Status: SHIPPED | OUTPATIENT
Start: 2024-03-27

## 2024-06-29 DIAGNOSIS — Z96.642 AFTERCARE FOLLOWING LEFT HIP JOINT REPLACEMENT SURGERY: ICD-10-CM

## 2024-06-29 DIAGNOSIS — Z47.1 AFTERCARE FOLLOWING LEFT HIP JOINT REPLACEMENT SURGERY: ICD-10-CM

## 2024-07-01 RX ORDER — CELECOXIB 200 MG/1
200 CAPSULE ORAL 2 TIMES DAILY
Qty: 60 CAPSULE | Refills: 2 | Status: SHIPPED | OUTPATIENT
Start: 2024-07-01

## 2024-09-19 ENCOUNTER — OFFICE VISIT (OUTPATIENT)
Dept: ORTHOPEDICS | Facility: CLINIC | Age: 44
End: 2024-09-19
Payer: COMMERCIAL

## 2024-09-19 ENCOUNTER — HOSPITAL ENCOUNTER (OUTPATIENT)
Dept: RADIOLOGY | Facility: CLINIC | Age: 44
Discharge: HOME | End: 2024-09-19
Attending: ORTHOPAEDIC SURGERY
Payer: COMMERCIAL

## 2024-09-19 VITALS
HEART RATE: 76 BPM | BODY MASS INDEX: 30.58 KG/M2 | SYSTOLIC BLOOD PRESSURE: 101 MMHG | HEIGHT: 61 IN | DIASTOLIC BLOOD PRESSURE: 71 MMHG | WEIGHT: 162 LBS

## 2024-09-19 DIAGNOSIS — Z47.1 AFTERCARE FOLLOWING LEFT HIP JOINT REPLACEMENT SURGERY: ICD-10-CM

## 2024-09-19 DIAGNOSIS — Z47.1 AFTERCARE FOLLOWING LEFT HIP JOINT REPLACEMENT SURGERY: Primary | ICD-10-CM

## 2024-09-19 DIAGNOSIS — Z96.642 AFTERCARE FOLLOWING LEFT HIP JOINT REPLACEMENT SURGERY: ICD-10-CM

## 2024-09-19 DIAGNOSIS — Z96.642 AFTERCARE FOLLOWING LEFT HIP JOINT REPLACEMENT SURGERY: Primary | ICD-10-CM

## 2024-09-19 PROCEDURE — 73502 X-RAY EXAM HIP UNI 2-3 VIEWS: CPT | Mod: LT,,, | Performed by: ORTHOPAEDIC SURGERY

## 2024-09-19 RX ORDER — GABAPENTIN 300 MG/1
600 CAPSULE ORAL NIGHTLY
Qty: 60 CAPSULE | Refills: 0 | Status: SHIPPED | OUTPATIENT
Start: 2024-09-19 | End: 2024-10-19

## 2024-09-19 NOTE — PROGRESS NOTES
Chief Complaint:   Chief Complaint   Patient presents with    Follow-up     F/u LT LAVERNE sx 8/15/24. Doing well overall. Pain well controlled. States some pain still presenting in groin region that radiates to knee. Worse with ambulation. Xr today.       History of present illness:    History of Present Illness  The patient is a 44-year-old female who presents for evaluation of hip pain.    She reports experiencing deep-seated hip pain that extends down to the bone, rather than being muscle-related. The pain is particularly noticeable when she attempts to move from her left side to her stomach while lying down, disrupting her sleep. The intensity of the pain varies, sometimes causing her to limp. She also experiences episodes where her leg jazzy due to pain, requiring her to hold onto something for support. Occasionally, she experiences numbness in her leg.    She has undergone three surgeries on the same hip and continues to experience pain two years post-surgery. She is currently taking anti-inflammatory medication and Celebrex for pain management. Despite the pain, she is able to walk up to 6000 steps a day, but any more than this results in a feeling of heaviness in her leg, to the point where she feels like she is dragging it.    Past Medical History:   Diagnosis Date    ARDS (adult respiratory distress syndrome)     Arthritis     Thyroid disease        Past Surgical History:   Procedure Laterality Date    ADENOIDECTOMY      ARTHROCENTESIS OF HIP JOINT Left 2022    Procedure: ARTHROCENTESIS, HIP aspiration under fluroscopy;  Surgeon: Michel Peña MD;  Location: [facility];  Service: Orthopedics;  Laterality: Left;     SECTION      x 2     SECTION   &2018    CHOLECYSTECTOMY  2011    INSERTION OF PERCUTANEOUS ENDOSCOPIC GASTROSTOMY (PEG) FEEDING TUBE      JOINT REPLACEMENT      KNEE ARTHROSCOPY Right     LAPAROSCOPIC CHOLECYSTECTOMY      ORIF HIP FRACTURE Left     X2    PEG  TUBE REMOVAL      REMOVAL OF TRACHEOSTOMY TUBE      REVISION TOTAL HIP ARTHROPLASTY Left 08/15/2022    Procedure: REVISION, TOTAL ARTHROPLASTY, HIP femur / cell saver / chetan / rest mod vs insignia / pathology;  Surgeon: Michel Peña MD;  Location: [facility];  Service: Orthopedics;  Laterality: Left;    SINUS SURGERY      x2    TONSILLECTOMY  1986    TOTAL HIP ARTHROPLASTY Left     TRACHEOTOMY         Current Outpatient Medications   Medication Sig    albuterol (PROVENTIL) 2.5 mg /3 mL (0.083 %) nebulizer solution Inhale 1 mL into the lungs daily as needed.    ALBUTEROL INHL Inhale 1 puff into the lungs daily as needed.    b complex vitamins tablet vitamin B complex    biotin 10 mg Tab biotin    budesonide-formoterol 160-4.5 mcg (SYMBICORT) 160-4.5 mcg/actuation HFAA Inhale 2 puffs into the lungs every 12 (twelve) hours. Controller    calcium carbonate (OS-LOGAN) 600 mg calcium (1,500 mg) Tab     celecoxib (CELEBREX) 200 MG capsule Take 1 capsule (200 mg total) by mouth 2 (two) times daily.    cholecalciferol, vitamin D3, 125 mcg (5,000 unit) Tab Take by mouth.    clonazePAM (KLONOPIN) 0.5 MG tablet Take 0.5 mg by mouth 2 (two) times daily as needed.    levothyroxine (SYNTHROID) 100 MCG tablet Take 112 mcg by mouth before breakfast.    methylPREDNISolone (MEDROL DOSEPACK) 4 mg tablet use as directed    multivitamin with minerals tablet Take 1 tablet by mouth once daily at 6am.    gabapentin (NEURONTIN) 300 MG capsule Take 2 capsules (600 mg total) by mouth every evening.     Current Facility-Administered Medications   Medication    albuterol sulfate nebulizer solution 2.5 mg       Review of patient's allergies indicates:   Allergen Reactions    Tizanidine Rash       Family History   Problem Relation Name Age of Onset    Asthma Mother Monique     Hearing loss Mother Monique     Miscarriages / Stillbirths Mother Monique     Diabetes Maternal Grandfather Shepard     Cancer Maternal Grandmother Leslie Hurst     Hearing loss Maternal Grandmother Leslie     Heart disease Maternal Grandmother Leslie     Stroke Maternal Grandmother Leslie        Social History     Socioeconomic History    Marital status: Single   Tobacco Use    Smoking status: Former     Current packs/day: 0.50     Average packs/day: 0.5 packs/day for 24.0 years (12.0 ttl pk-yrs)     Types: Cigarettes    Smokeless tobacco: Never    Tobacco comments:     Pt quit smoking about year in half the patient smoked for total of of 24 years. Pt used to smoke about .5PPD.   Substance and Sexual Activity    Alcohol use: Yes     Alcohol/week: 1.0 standard drink of alcohol     Types: 1 Glasses of wine per week     Comment: occas    Drug use: Never    Sexual activity: Yes     Partners: Male     Birth control/protection: Partner-Vasectomy, None     Comment:  had vasectomy     Social Determinants of Health     Financial Resource Strain: Low Risk  (12/12/2023)    Overall Financial Resource Strain (CARDIA)     Difficulty of Paying Living Expenses: Not hard at all   Food Insecurity: No Food Insecurity (12/12/2023)    Hunger Vital Sign     Worried About Running Out of Food in the Last Year: Never true     Ran Out of Food in the Last Year: Never true   Transportation Needs: No Transportation Needs (12/12/2023)    PRAPARE - Transportation     Lack of Transportation (Medical): No     Lack of Transportation (Non-Medical): No   Physical Activity: Unknown (12/12/2023)    Exercise Vital Sign     Days of Exercise per Week: 0 days   Recent Concern: Physical Activity - Inactive (10/19/2023)    Received from [facility] Its Subsidiaries and Affiliates, [facility] Subsidiaries and Affiliates    Exercise Vital Sign     Days of Exercise per Week: 0 days     Minutes of Exercise per Session: 0 min   Stress: Stress Concern Present (12/12/2023)    [employer] [place] [employer] -  Occupational Stress Questionnaire     Feeling of Stress : To some extent   Housing Stability: Low Risk  (12/12/2023)    Housing Stability Vital Sign     Unable to Pay for Housing in the Last Year: No     Number of Places Lived in the Last Year: 1     Unstable Housing in the Last Year: No           Review of Systems:    Constitution: Negative for chills, fever, and sweats.  Negative for unexplained weight loss.    HENT:  Negative for headaches and blurry vision.    Cardiovascular:Negative for chest pain or irregular heart beat. Negative for hypertension.    Respiratory:  Negative for cough and shortness of breath.    Gastrointestinal: Negative for abdominal pain, heartburn, melena, nausea, and vomitting.    Genitourinary:  Negative bladder incontinence and dysuria.    Musculoskeletal:  See HPI    Neurological: Negative for numbness.    Psychiatric/Behavioral: Negative for depression.  The patient is not nervous/anxious.      Endocrine: Negative for polyuria    Hematologic/Lymphatic: Negative for bleeding problem.  Does not bruise/bleed easily.    Skin: Negative for poor would healing and rash      Physical Examination:    Vital Signs:    Vitals:    09/19/24 0908   BP: 101/71   Pulse: 76       Body mass index is 30.61 kg/m².    General: No acute distress, alert and oriented, healthy appearing    HEENT: Head is atraumatic, mucous membranes are moist    Neck: Supples, no JVD    Cardiovascular: Palpable dorsalis pedis and posterior tibial pulses, regular rate and rhythm to those pulses    Lungs: Breathing non-labored    Skin: no rashes appreciated    Neurologic: Can flex and extend knees, ankles, and toes. Sensation is grossly intact    Left hip:  Range of motion left hip without significant or severe pain.  Patient stands up with no significant.  Brisk cap refill disappeared sensation intact distally.  Positive Stinchfield mild    X-rays:  Three views left hip reviewed with the patient's implants appear well fixed with  no signs of loosening or subsidence.  Patient's.  It was have well ingrown surfaces.     Assessment::  Status post left total hip arthroplasty revision    Plan:  Patient overall is doing fairly well.  She continues to have some pain.  It was maybe chronic in nature from her multiple injuries to the left hip.  Some of it appears to be neurologic and neurogenic maybe related to obturator irritation.  We will initially plan to give her some Neurontin.  We will also get a bone scan.  She was have some loosening from previous surgery.  It was no overt signs of that today.    This note was generated with the assistance of ambient listening technology. Verbal consent was obtained by the patient and accompanying visitor(s) for the recording of patient appointment to facilitate this note. I attest to having reviewed and edited the generated note for accuracy, though some syntax or spelling errors may persist. Please contact the author of this note for any clarification.      This note was created using [employer] voice recognition software that occasionally misinterpreted phrases or words.    Consult note is delivered via Epic messaging service.

## 2024-09-30 ENCOUNTER — HOSPITAL ENCOUNTER (OUTPATIENT)
Dept: RADIOLOGY | Facility: HOSPITAL | Age: 44
Discharge: HOME | End: 2024-09-30
Attending: ORTHOPAEDIC SURGERY
Payer: COMMERCIAL

## 2024-09-30 DIAGNOSIS — Z96.642 AFTERCARE FOLLOWING LEFT HIP JOINT REPLACEMENT SURGERY: ICD-10-CM

## 2024-09-30 DIAGNOSIS — Z47.1 AFTERCARE FOLLOWING LEFT HIP JOINT REPLACEMENT SURGERY: ICD-10-CM

## 2024-09-30 PROCEDURE — 78306 BONE IMAGING WHOLE BODY: CPT | Mod: TC

## 2024-09-30 PROCEDURE — A9503 TC99M MEDRONATE: HCPCS | Performed by: ORTHOPAEDIC SURGERY

## 2024-09-30 RX ADMIN — TECHNETIUM TC 99M MEDRONATE 25.9 MILLICURIE: 20 INJECTION, POWDER, LYOPHILIZED, FOR SOLUTION INTRAVENOUS at 09:09

## 2024-10-01 DIAGNOSIS — Z47.1 AFTERCARE FOLLOWING LEFT HIP JOINT REPLACEMENT SURGERY: ICD-10-CM

## 2024-10-01 DIAGNOSIS — Z96.642 AFTERCARE FOLLOWING LEFT HIP JOINT REPLACEMENT SURGERY: ICD-10-CM

## 2024-10-01 RX ORDER — CELECOXIB 200 MG/1
200 CAPSULE ORAL 2 TIMES DAILY
Qty: 60 CAPSULE | Refills: 2 | Status: SHIPPED | OUTPATIENT
Start: 2024-10-01

## 2024-10-24 ENCOUNTER — OFFICE VISIT (OUTPATIENT)
Dept: ORTHOPEDICS | Facility: CLINIC | Age: 44
End: 2024-10-24
Payer: COMMERCIAL

## 2024-10-24 VITALS
BODY MASS INDEX: 31.53 KG/M2 | HEIGHT: 61 IN | DIASTOLIC BLOOD PRESSURE: 71 MMHG | HEART RATE: 70 BPM | WEIGHT: 167 LBS | SYSTOLIC BLOOD PRESSURE: 114 MMHG

## 2024-10-24 DIAGNOSIS — Z47.1 AFTERCARE FOLLOWING LEFT HIP JOINT REPLACEMENT SURGERY: Primary | ICD-10-CM

## 2024-10-24 DIAGNOSIS — Z96.642 AFTERCARE FOLLOWING LEFT HIP JOINT REPLACEMENT SURGERY: Primary | ICD-10-CM

## 2024-10-24 NOTE — PROGRESS NOTES
Chief Complaint:   Chief Complaint   Patient presents with    Left Hip - Follow-up     Bone scan results--Lt LAVERNE.        History of present illness:    History of Present Illness  The patient presents for evaluation of hip pain.    She attributes the hip pain to a previous fall that resulted in a fracture. She attempted to manage the pain with Neurontin, taking two capsules daily, but discontinued after four days due to feelings of grogginess. The medication did not significantly alleviate her pain, but it did improve her sleep quality and reduced morning stiffness by approximately 50%.    She reports that the bruise from the injection administered a month ago has now faded. Additionally, she mentions an incident where she experienced numbness in her calf while sitting in a recliner with her leg extended. She had to adjust her position to alleviate the discomfort.    She believes that her recent COVID-19 infection has exacerbated her pain and limited her daily activities.    Past Medical History:   Diagnosis Date    ARDS (adult respiratory distress syndrome)     Arthritis     Thyroid disease        Past Surgical History:   Procedure Laterality Date    ADENOIDECTOMY  1986    ARTHROCENTESIS OF HIP JOINT Left 2022    Procedure: ARTHROCENTESIS, HIP aspiration under fluroscopy;  Surgeon: Michel Peña MD;  Location: [facility];  Service: Orthopedics;  Laterality: Left;     SECTION      x 2     SECTION   &    CHOLECYSTECTOMY      INSERTION OF PERCUTANEOUS ENDOSCOPIC GASTROSTOMY (PEG) FEEDING TUBE      JOINT REPLACEMENT      KNEE ARTHROSCOPY Right     LAPAROSCOPIC CHOLECYSTECTOMY      ORIF HIP FRACTURE Left     X2    PEG TUBE REMOVAL      REMOVAL OF TRACHEOSTOMY TUBE      REVISION TOTAL HIP ARTHROPLASTY Left 08/15/2022    Procedure: REVISION, TOTAL ARTHROPLASTY, HIP femur / cell saver / chetan / rest mod vs insignia / pathology;  Surgeon: Michel Peña MD;  Location: [facility] OR;   Service: Orthopedics;  Laterality: Left;    SINUS SURGERY      x2    TONSILLECTOMY  1986    TOTAL HIP ARTHROPLASTY Left     TRACHEOTOMY         Current Outpatient Medications   Medication Sig    albuterol (PROVENTIL) 2.5 mg /3 mL (0.083 %) nebulizer solution Inhale 1 mL into the lungs daily as needed.    ALBUTEROL INHL Inhale 1 puff into the lungs daily as needed.    budesonide-formoterol 160-4.5 mcg (SYMBICORT) 160-4.5 mcg/actuation HFAA Inhale 2 puffs into the lungs every 12 (twelve) hours. Controller    calcium carbonate (OS-LOGAN) 600 mg calcium (1,500 mg) Tab     celecoxib (CELEBREX) 200 MG capsule Take 1 capsule (200 mg total) by mouth 2 (two) times daily.    cholecalciferol, vitamin D3, 125 mcg (5,000 unit) Tab Take by mouth.    clonazePAM (KLONOPIN) 0.5 MG tablet Take 0.5 mg by mouth 2 (two) times daily as needed.    levothyroxine (SYNTHROID) 100 MCG tablet Take 112 mcg by mouth before breakfast.    multivitamin with minerals tablet Take 1 tablet by mouth once daily at 6am.    b complex vitamins tablet vitamin B complex    biotin 10 mg Tab biotin    gabapentin (NEURONTIN) 300 MG capsule Take 2 capsules (600 mg total) by mouth every evening.     Current Facility-Administered Medications   Medication    albuterol sulfate nebulizer solution 2.5 mg       Review of patient's allergies indicates:   Allergen Reactions    Tizanidine Rash       Family History   Problem Relation Name Age of Onset    Asthma Mother Monique     Hearing loss Mother Monique     Miscarriages / Stillbirths Mother Monique     Diabetes Maternal Grandfather Shepard     Cancer Maternal Grandmother Leslie         Breast    Hearing loss Maternal Grandmother Leslie     Heart disease Maternal Grandmother Leslie     Stroke Maternal Grandmother Leslie        Social History     Socioeconomic History    Marital status:    Tobacco Use    Smoking status: Former     Current packs/day: 0.50     Average packs/day: 0.5 packs/day for 24.0 years (12.0  ttl pk-yrs)     Types: Cigarettes    Smokeless tobacco: Never    Tobacco comments:     Pt quit smoking about year in half the patient smoked for total of of 24 years. Pt used to smoke about .5PPD.   Substance and Sexual Activity    Alcohol use: Yes     Alcohol/week: 1.0 standard drink of alcohol     Types: 1 Glasses of wine per week     Comment: occas    Drug use: Never    Sexual activity: Yes     Partners: Male     Birth control/protection: Partner-Vasectomy, None     Comment:  had vasectomy     Social Drivers of Health     Financial Resource Strain: Low Risk  (10/22/2024)    [facility] and Its Subsidiaries and Affiliates    Overall Financial Resource Strain (CARDIA)     Difficulty of Paying Living Expenses: Not hard at all   Food Insecurity: No Food Insecurity (10/22/2024)    Received from [facility] and Its Subsidiaries and Affiliates    Hunger Vital Sign     Worried About Running Out of Food in the Last Year: Never true     Ran Out of Food in the Last Year: Never true   Transportation Needs: No Transportation Needs (10/22/2024)    [facility]    PRAPARE - Transportation     Lack of Transportation (Medical): No     Lack of Transportation (Non-Medical): No   Physical Activity: Inactive (10/22/2024)    [facility]    Exercise Vital Sign     Days of Exercise per Week: 0 days     Minutes of Exercise per Session: 0 min   Stress: Stress Concern Present (10/22/2024)    Received from [facility]idi[facility] and Aff[facility]ates    [employer] [place] [employer] - Occupational Stress Questionnaire     Feeling of Stress : To some extent   Housing Stability: Low Risk  (10/22/2024)    [facility] Its Subsidiaries and Affiliates    Housing Stability Vital Sign     Unable to Pay for Housing in the Last Year: No     Number of Times Moved in the Last Year: 0     Homeless in the Last Year: No           Review of Systems:    Constitution: Negative for chills, fever, and sweats.  Negative for unexplained weight loss.    HENT:  Negative for headaches and blurry vision.    Cardiovascular:Negative for chest pain or irregular heart beat. Negative for hypertension.    Respiratory:  Negative for cough and shortness of breath.    Gastrointestinal: Negative for abdominal pain, heartburn, melena, nausea, and vomitting.    Genitourinary:  Negative bladder incontinence and dysuria.    Musculoskeletal:  See HPI    Neurological: Negative for numbness.    Psychiatric/Behavioral: Negative for depression.  The patient is not nervous/anxious.      Endocrine: Negative for polyuria    Hematologic/Lymphatic: Negative for bleeding problem.  Does not bruise/bleed easily.    Skin: Negative for poor would healing and rash      Physical Examination:    Vital Signs:    Vitals:    10/24/24 1007   BP: 114/71   Pulse: 70       Body mass index is 31.55 kg/m².    General: No acute distress, alert and oriented, healthy appearing    HEENT: Head is atraumatic, mucous membranes are moist    Neck: Supples, no JVD    Cardiovascular: Palpable dorsalis pedis and posterior tibial pulses, regular rate and rhythm to those pulses    Lungs: Breathing non-labored    Skin: no rashes appreciated    Neurologic: Can flex and extend knees, ankles, and toes. Sensation is grossly intact    Brisk cap refill disappeared sensation intact disappeared range of motion left hip without significant or severe pain.  Patient walks with a slightly antalgic gait.    X-rays:  All scan reviewed.  Patient with some increase uptake in the left hip however no overt signs of loosening.     Assessment::  Status post left total hip  arthroplasty    Plan:  Patient was overall with some increased uptake although no clear signs of overt loosening.  We will monitor this closely.  Plan to see her back in about 6 months.  Repeat x-rays when she returns.    This note was generated with the assistance of ambient listening technology. Verbal consent was obtained by the patient and accompanying visitor(s) for the recording of patient appointment to facilitate this note. I attest to having reviewed and edited the generated note for accuracy, though some syntax or spelling errors may persist. Please contact the author of this note for any clarification.      This note was created using [employer] voice recognition software that occasionally misinterpreted phrases or words.    Consult note is delivered via Epic messaging service.

## 2024-11-04 RX ORDER — GABAPENTIN 300 MG/1
600 CAPSULE ORAL NIGHTLY
Qty: 60 CAPSULE | Refills: 0 | Status: SHIPPED | OUTPATIENT
Start: 2024-11-04 | End: 2024-12-04

## 2025-01-03 DIAGNOSIS — Z96.642 AFTERCARE FOLLOWING LEFT HIP JOINT REPLACEMENT SURGERY: ICD-10-CM

## 2025-01-03 DIAGNOSIS — Z47.1 AFTERCARE FOLLOWING LEFT HIP JOINT REPLACEMENT SURGERY: ICD-10-CM

## 2025-01-03 RX ORDER — GABAPENTIN 300 MG/1
600 CAPSULE ORAL NIGHTLY
Qty: 60 CAPSULE | Refills: 0 | Status: SHIPPED | OUTPATIENT
Start: 2025-01-03 | End: 2025-02-02

## 2025-01-06 RX ORDER — CELECOXIB 200 MG/1
200 CAPSULE ORAL 2 TIMES DAILY
Qty: 60 CAPSULE | Refills: 2 | Status: SHIPPED | OUTPATIENT
Start: 2025-01-06

## 2025-04-07 DIAGNOSIS — Z96.642 AFTERCARE FOLLOWING LEFT HIP JOINT REPLACEMENT SURGERY: ICD-10-CM

## 2025-04-07 DIAGNOSIS — Z47.1 AFTERCARE FOLLOWING LEFT HIP JOINT REPLACEMENT SURGERY: ICD-10-CM

## 2025-04-07 RX ORDER — CELECOXIB 200 MG/1
200 CAPSULE ORAL 2 TIMES DAILY
Qty: 60 CAPSULE | Refills: 2 | Status: SHIPPED | OUTPATIENT
Start: 2025-04-07

## 2025-07-01 ENCOUNTER — HOSPITAL ENCOUNTER (OUTPATIENT)
Dept: RADIOLOGY | Facility: CLINIC | Age: 45
Discharge: HOME | End: 2025-07-01
Attending: ORTHOPAEDIC SURGERY
Payer: COMMERCIAL

## 2025-07-01 ENCOUNTER — OFFICE VISIT (OUTPATIENT)
Dept: ORTHOPEDICS | Facility: CLINIC | Age: 45
End: 2025-07-01
Payer: COMMERCIAL

## 2025-07-01 VITALS
DIASTOLIC BLOOD PRESSURE: 78 MMHG | BODY MASS INDEX: 32.17 KG/M2 | HEIGHT: 61 IN | WEIGHT: 170.38 LBS | HEART RATE: 83 BPM | SYSTOLIC BLOOD PRESSURE: 124 MMHG

## 2025-07-01 DIAGNOSIS — Z96.642 AFTERCARE FOLLOWING LEFT HIP JOINT REPLACEMENT SURGERY: ICD-10-CM

## 2025-07-01 DIAGNOSIS — Z47.1 AFTERCARE FOLLOWING LEFT HIP JOINT REPLACEMENT SURGERY: Primary | ICD-10-CM

## 2025-07-01 DIAGNOSIS — Z96.642 AFTERCARE FOLLOWING LEFT HIP JOINT REPLACEMENT SURGERY: Primary | ICD-10-CM

## 2025-07-01 DIAGNOSIS — Z47.1 AFTERCARE FOLLOWING LEFT HIP JOINT REPLACEMENT SURGERY: ICD-10-CM

## 2025-07-01 PROCEDURE — 99214 OFFICE O/P EST MOD 30 MIN: CPT | Mod: ,,, | Performed by: ORTHOPAEDIC SURGERY

## 2025-07-01 PROCEDURE — 73502 X-RAY EXAM HIP UNI 2-3 VIEWS: CPT | Mod: LT,,, | Performed by: ORTHOPAEDIC SURGERY

## 2025-07-01 PROCEDURE — 3078F DIAST BP <80 MM HG: CPT | Mod: CPTII,,, | Performed by: ORTHOPAEDIC SURGERY

## 2025-07-01 PROCEDURE — 3008F BODY MASS INDEX DOCD: CPT | Mod: CPTII,,, | Performed by: ORTHOPAEDIC SURGERY

## 2025-07-01 PROCEDURE — 3074F SYST BP LT 130 MM HG: CPT | Mod: CPTII,,, | Performed by: ORTHOPAEDIC SURGERY

## 2025-07-01 PROCEDURE — 1159F MED LIST DOCD IN RCRD: CPT | Mod: CPTII,,, | Performed by: ORTHOPAEDIC SURGERY

## 2025-07-01 RX ORDER — CELECOXIB 200 MG/1
200 CAPSULE ORAL 2 TIMES DAILY
Qty: 60 CAPSULE | Refills: 2 | Status: SHIPPED | OUTPATIENT
Start: 2025-07-01

## 2025-07-01 RX ORDER — IMMUNE GLOBULIN INFUSION (HUMAN) 100 MG/ML
45 INJECTION, SOLUTION INTRAVENOUS; SUBCUTANEOUS
COMMUNITY

## 2025-07-01 RX ORDER — SODIUM CHLORIDE 9 MG/ML
INJECTION, SOLUTION INTRAVENOUS
COMMUNITY
Start: 2025-05-19

## 2025-07-01 RX ORDER — SODIUM CHLORIDE 0.9 % (FLUSH) 0.9 %
SYRINGE (ML) INJECTION
COMMUNITY
Start: 2025-02-25

## 2025-07-01 RX ORDER — MINOXIDIL 2.5 MG/1
2.5 TABLET ORAL EVERY MORNING
COMMUNITY
Start: 2025-06-01

## 2025-07-01 RX ORDER — B.COAGUL,SUBTILIS/INULIN/VIT C 1B CELL-1G
TABLET,CHEWABLE ORAL
COMMUNITY
Start: 2023-09-01
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
## (undated) DEVICE — SHEET DRAPE MEDIUM

## (undated) DEVICE — GLOVE PROTEXIS HYDROGEL SZ8.5

## (undated) DEVICE — SYS PREVENA 7 DAY 25.4CM 10IN

## (undated) DEVICE — PAD ABD 8X10 STERILE

## (undated) DEVICE — DRAPE SURG W/TWL 17 5/8X23

## (undated) DEVICE — APPLICATOR CHLORAPREP ORN 26ML

## (undated) DEVICE — SYR 10CC LUER LOCK

## (undated) DEVICE — SUT VICRYL 2-0 36 CT-1

## (undated) DEVICE — NDL HYPO REG 25G X 1 1/2

## (undated) DEVICE — GOWN POLY REINF X-LONG 2XL

## (undated) DEVICE — BANDAID HOT COLORS

## (undated) DEVICE — Device

## (undated) DEVICE — BANDAGE ADHESIVE FABRIC 2X4

## (undated) DEVICE — GLOVE PROTEXIS HYDROGEL SZ6.5

## (undated) DEVICE — SOL NACL IRR 3000ML

## (undated) DEVICE — PILLOW ABDUCTION FOAM MED

## (undated) DEVICE — CELL SAVER KIT

## (undated) DEVICE — NDL ANES SPINAL 18X3.5ST 18G

## (undated) DEVICE — CONTRAST ISOVUE 300 50ML

## (undated) DEVICE — GUIDE WIRE 3.0X1000MM BALL TIP
Type: IMPLANTABLE DEVICE | Site: HIP | Status: NON-FUNCTIONAL
Removed: 2022-08-15

## (undated) DEVICE — DRAPE INCISE IOBAN 2 23X23IN

## (undated) DEVICE — KIT TOTAL HIP HLGC

## (undated) DEVICE — STAPLER SKIN PROXIMATE WIDE

## (undated) DEVICE — SUT MONO 2-0 CT-1 VIL

## (undated) DEVICE — GAUZE SPONGE 4X4 12PLY

## (undated) DEVICE — TOWEL OR BLUE STRL 16X26 4/PK

## (undated) DEVICE — KIT SURGICAL TURNOVER

## (undated) DEVICE — RETRIEVER SUTURE HEWSON DISP

## (undated) DEVICE — DRESSING XEROFORM FOIL PK 1X8

## (undated) DEVICE — KIT DRAIN WOUND RND SPRNG RESV

## (undated) DEVICE — TAPE ADH MEDIPORE 4 X 10YDS

## (undated) DEVICE — SOL NACL IRR 1000ML BTL

## (undated) DEVICE — COVER HD BACK TABLE 6FT

## (undated) DEVICE — DEVICE STRATAFIX SYMMETRIC +

## (undated) DEVICE — DRAPE STERI U-SHAPED 47X51IN

## (undated) DEVICE — GLOVE PROTEXIS BLUE LATEX 8.5

## (undated) DEVICE — GLOVE 7.0 PROTEXIS PI BLUE

## (undated) DEVICE — DRAPE FULL SHEET 70X100IN

## (undated) DEVICE — SUT VICRYL BR 1 GEN 27 CT-1

## (undated) DEVICE — SYR 30CC LUER LOCK

## (undated) DEVICE — ELECTRODE PATIENT RETURN DISP

## (undated) DEVICE — SUT BLU BR 2 TAPERD NDL 1/2

## (undated) DEVICE — CULTSWAB+ AMIES W/O CHARC DBL